# Patient Record
Sex: FEMALE | Race: WHITE | Employment: OTHER | URBAN - METROPOLITAN AREA
[De-identification: names, ages, dates, MRNs, and addresses within clinical notes are randomized per-mention and may not be internally consistent; named-entity substitution may affect disease eponyms.]

---

## 2022-01-01 ENCOUNTER — ANESTHESIA (OUTPATIENT)
Dept: OPERATING ROOM | Age: 87
DRG: 492 | End: 2022-01-01
Payer: COMMERCIAL

## 2022-01-01 ENCOUNTER — APPOINTMENT (OUTPATIENT)
Dept: GENERAL RADIOLOGY | Age: 87
DRG: 492 | End: 2022-01-01
Payer: COMMERCIAL

## 2022-01-01 ENCOUNTER — APPOINTMENT (OUTPATIENT)
Dept: CT IMAGING | Age: 87
DRG: 492 | End: 2022-01-01
Payer: COMMERCIAL

## 2022-01-01 ENCOUNTER — ANESTHESIA EVENT (OUTPATIENT)
Dept: OPERATING ROOM | Age: 87
DRG: 492 | End: 2022-01-01
Payer: COMMERCIAL

## 2022-01-01 ENCOUNTER — HOSPITAL ENCOUNTER (INPATIENT)
Age: 87
LOS: 7 days | DRG: 492 | End: 2022-02-06
Attending: EMERGENCY MEDICINE | Admitting: SURGERY
Payer: COMMERCIAL

## 2022-01-01 ENCOUNTER — APPOINTMENT (OUTPATIENT)
Dept: INTERVENTIONAL RADIOLOGY/VASCULAR | Age: 87
DRG: 492 | End: 2022-01-01
Payer: COMMERCIAL

## 2022-01-01 VITALS
HEIGHT: 69 IN | DIASTOLIC BLOOD PRESSURE: 73 MMHG | WEIGHT: 264 LBS | OXYGEN SATURATION: 85 % | BODY MASS INDEX: 39.1 KG/M2 | TEMPERATURE: 100.7 F | RESPIRATION RATE: 14 BRPM | SYSTOLIC BLOOD PRESSURE: 131 MMHG

## 2022-01-01 VITALS — DIASTOLIC BLOOD PRESSURE: 64 MMHG | TEMPERATURE: 99.4 F | SYSTOLIC BLOOD PRESSURE: 88 MMHG | OXYGEN SATURATION: 88 %

## 2022-01-01 DIAGNOSIS — S82.201B: Primary | ICD-10-CM

## 2022-01-01 DIAGNOSIS — S82.202B: Primary | ICD-10-CM

## 2022-01-01 LAB
ABO/RH: NORMAL
ABSOLUTE EOS #: 0.11 K/UL (ref 0–0.44)
ABSOLUTE EOS #: 0.15 K/UL (ref 0–0.44)
ABSOLUTE EOS #: 0.16 K/UL (ref 0–0.44)
ABSOLUTE EOS #: 0.29 K/UL (ref 0–0.44)
ABSOLUTE EOS #: 0.35 K/UL (ref 0–0.44)
ABSOLUTE EOS #: 0.41 K/UL (ref 0–0.44)
ABSOLUTE EOS #: 0.55 K/UL (ref 0–0.4)
ABSOLUTE IMMATURE GRANULOCYTE: 0.03 K/UL (ref 0–0.3)
ABSOLUTE IMMATURE GRANULOCYTE: 0.07 K/UL (ref 0–0.3)
ABSOLUTE IMMATURE GRANULOCYTE: 0.07 K/UL (ref 0–0.3)
ABSOLUTE IMMATURE GRANULOCYTE: 0.09 K/UL (ref 0–0.3)
ABSOLUTE IMMATURE GRANULOCYTE: 0.11 K/UL (ref 0–0.3)
ABSOLUTE IMMATURE GRANULOCYTE: 0.14 K/UL (ref 0–0.3)
ABSOLUTE IMMATURE GRANULOCYTE: 0.15 K/UL (ref 0–0.3)
ABSOLUTE LYMPH #: 1.1 K/UL (ref 1–4.8)
ABSOLUTE LYMPH #: 1.19 K/UL (ref 1.1–3.7)
ABSOLUTE LYMPH #: 1.52 K/UL (ref 1.1–3.7)
ABSOLUTE LYMPH #: 1.54 K/UL (ref 1.1–3.7)
ABSOLUTE LYMPH #: 1.65 K/UL (ref 1.1–3.7)
ABSOLUTE LYMPH #: 1.96 K/UL (ref 1.1–3.7)
ABSOLUTE LYMPH #: 2.02 K/UL (ref 1.1–3.7)
ABSOLUTE MONO #: 1.1 K/UL (ref 0.1–0.8)
ABSOLUTE MONO #: 1.13 K/UL (ref 0.1–1.2)
ABSOLUTE MONO #: 1.16 K/UL (ref 0.1–1.2)
ABSOLUTE MONO #: 1.21 K/UL (ref 0.1–1.2)
ABSOLUTE MONO #: 1.42 K/UL (ref 0.1–1.2)
ABSOLUTE MONO #: 1.81 K/UL (ref 0.1–1.2)
ABSOLUTE MONO #: 2.3 K/UL (ref 0.1–1.2)
ALLEN TEST: ABNORMAL
ALLEN TEST: NORMAL
ALLEN TEST: NORMAL
ANION GAP SERPL CALCULATED.3IONS-SCNC: 10 MMOL/L (ref 9–17)
ANION GAP SERPL CALCULATED.3IONS-SCNC: 12 MMOL/L (ref 9–17)
ANION GAP SERPL CALCULATED.3IONS-SCNC: 12 MMOL/L (ref 9–17)
ANION GAP SERPL CALCULATED.3IONS-SCNC: 14 MMOL/L (ref 9–17)
ANION GAP SERPL CALCULATED.3IONS-SCNC: 5 MMOL/L (ref 9–17)
ANION GAP SERPL CALCULATED.3IONS-SCNC: 6 MMOL/L (ref 9–17)
ANION GAP SERPL CALCULATED.3IONS-SCNC: 6 MMOL/L (ref 9–17)
ANION GAP SERPL CALCULATED.3IONS-SCNC: 9 MMOL/L (ref 9–17)
ANION GAP: 10 MMOL/L (ref 7–16)
ANION GAP: 11 MMOL/L (ref 7–16)
ANTIBODY SCREEN: NEGATIVE
ARM BAND NUMBER: NORMAL
BASOPHILS # BLD: 0 % (ref 0–2)
BASOPHILS ABSOLUTE: 0 K/UL (ref 0–0.2)
BASOPHILS ABSOLUTE: <0.03 K/UL (ref 0–0.2)
BLD PROD TYP BPU: NORMAL
BLD PROD TYP BPU: NORMAL
BLOOD BANK SPECIMEN: ABNORMAL
BNP INTERPRETATION: ABNORMAL
BUN BLDV-MCNC: 30 MG/DL (ref 8–23)
BUN BLDV-MCNC: 33 MG/DL (ref 8–23)
BUN BLDV-MCNC: 35 MG/DL (ref 8–23)
BUN BLDV-MCNC: 35 MG/DL (ref 8–23)
BUN BLDV-MCNC: 36 MG/DL (ref 8–23)
BUN/CREAT BLD: ABNORMAL (ref 9–20)
CALCIUM IONIZED: 1.25 MMOL/L (ref 1.13–1.33)
CALCIUM IONIZED: 1.26 MMOL/L (ref 1.13–1.33)
CALCIUM SERPL-MCNC: 8.6 MG/DL (ref 8.6–10.4)
CALCIUM SERPL-MCNC: 9 MG/DL (ref 8.6–10.4)
CALCIUM SERPL-MCNC: 9.1 MG/DL (ref 8.6–10.4)
CALCIUM SERPL-MCNC: 9.2 MG/DL (ref 8.6–10.4)
CALCIUM SERPL-MCNC: 9.3 MG/DL (ref 8.6–10.4)
CARBOXYHEMOGLOBIN: 1.7 % (ref 0–5)
CARBOXYHEMOGLOBIN: 2.7 % (ref 0–5)
CHLORIDE BLD-SCNC: 100 MMOL/L (ref 98–107)
CHLORIDE BLD-SCNC: 100 MMOL/L (ref 98–107)
CHLORIDE BLD-SCNC: 102 MMOL/L (ref 98–107)
CHLORIDE BLD-SCNC: 107 MMOL/L (ref 98–107)
CHLORIDE BLD-SCNC: 108 MMOL/L (ref 98–107)
CHLORIDE BLD-SCNC: 96 MMOL/L (ref 98–107)
CHLORIDE BLD-SCNC: 98 MMOL/L (ref 98–107)
CHLORIDE BLD-SCNC: 99 MMOL/L (ref 98–107)
CHLORIDE, WHOLE BLOOD: 101 MMOL/L (ref 98–110)
CO2: 23 MMOL/L (ref 20–31)
CO2: 23 MMOL/L (ref 20–31)
CO2: 24 MMOL/L (ref 20–31)
CO2: 27 MMOL/L (ref 20–31)
CO2: 27 MMOL/L (ref 20–31)
CO2: 28 MMOL/L (ref 20–31)
CO2: 30 MMOL/L (ref 20–31)
CO2: 30 MMOL/L (ref 20–31)
CREAT SERPL-MCNC: 1.01 MG/DL (ref 0.5–0.9)
CREAT SERPL-MCNC: 1.12 MG/DL (ref 0.5–0.9)
CREAT SERPL-MCNC: 1.16 MG/DL (ref 0.5–0.9)
CREAT SERPL-MCNC: 1.45 MG/DL (ref 0.5–0.9)
CREAT SERPL-MCNC: 1.53 MG/DL (ref 0.5–0.9)
CREAT SERPL-MCNC: 1.7 MG/DL (ref 0.5–0.9)
CREAT SERPL-MCNC: 1.75 MG/DL (ref 0.5–0.9)
CREAT SERPL-MCNC: 1.79 MG/DL (ref 0.5–0.9)
CROSSMATCH RESULT: NORMAL
CROSSMATCH RESULT: NORMAL
CULTURE: NO GROWTH
DIFFERENTIAL TYPE: ABNORMAL
DISPENSE STATUS BLOOD BANK: NORMAL
DISPENSE STATUS BLOOD BANK: NORMAL
EKG ATRIAL RATE: 127 BPM
EKG ATRIAL RATE: 136 BPM
EKG ATRIAL RATE: 46 BPM
EKG ATRIAL RATE: 78 BPM
EKG ATRIAL RATE: 91 BPM
EKG Q-T INTERVAL: 288 MS
EKG Q-T INTERVAL: 324 MS
EKG Q-T INTERVAL: 352 MS
EKG Q-T INTERVAL: 358 MS
EKG Q-T INTERVAL: 374 MS
EKG QRS DURATION: 80 MS
EKG QRS DURATION: 84 MS
EKG QRS DURATION: 86 MS
EKG QRS DURATION: 88 MS
EKG QRS DURATION: 88 MS
EKG QTC CALCULATION (BAZETT): 387 MS
EKG QTC CALCULATION (BAZETT): 438 MS
EKG QTC CALCULATION (BAZETT): 442 MS
EKG QTC CALCULATION (BAZETT): 479 MS
EKG QTC CALCULATION (BAZETT): 495 MS
EKG R AXIS: -20 DEGREES
EKG R AXIS: -20 DEGREES
EKG R AXIS: -23 DEGREES
EKG R AXIS: -6 DEGREES
EKG R AXIS: -9 DEGREES
EKG T AXIS: -125 DEGREES
EKG T AXIS: -177 DEGREES
EKG T AXIS: 120 DEGREES
EKG T AXIS: 165 DEGREES
EKG T AXIS: 75 DEGREES
EKG VENTRICULAR RATE: 109 BPM
EKG VENTRICULAR RATE: 110 BPM
EKG VENTRICULAR RATE: 119 BPM
EKG VENTRICULAR RATE: 92 BPM
EKG VENTRICULAR RATE: 99 BPM
EOSINOPHILS RELATIVE PERCENT: 1 % (ref 1–4)
EOSINOPHILS RELATIVE PERCENT: 1 % (ref 1–4)
EOSINOPHILS RELATIVE PERCENT: 2 % (ref 1–4)
EOSINOPHILS RELATIVE PERCENT: 2 % (ref 1–4)
EOSINOPHILS RELATIVE PERCENT: 5 % (ref 1–4)
ETHANOL PERCENT: <0.01 %
ETHANOL: <10 MG/DL
EXPIRATION DATE: NORMAL
FIO2: 100
FIO2: 30
FIO2: 60
FIO2: ABNORMAL
GFR AFRICAN AMERICAN: 32 ML/MIN
GFR AFRICAN AMERICAN: 33 ML/MIN
GFR AFRICAN AMERICAN: 34 ML/MIN
GFR AFRICAN AMERICAN: 39 ML/MIN
GFR AFRICAN AMERICAN: 53 ML/MIN
GFR AFRICAN AMERICAN: 56 ML/MIN
GFR AFRICAN AMERICAN: >60 ML/MIN
GFR AFRICAN AMERICAN: ABNORMAL ML/MIN
GFR NON-AFRICAN AMERICAN: 26 ML/MIN
GFR NON-AFRICAN AMERICAN: 27 ML/MIN
GFR NON-AFRICAN AMERICAN: 27 ML/MIN
GFR NON-AFRICAN AMERICAN: 28 ML/MIN
GFR NON-AFRICAN AMERICAN: 30 ML/MIN
GFR NON-AFRICAN AMERICAN: 32 ML/MIN
GFR NON-AFRICAN AMERICAN: 44 ML/MIN
GFR NON-AFRICAN AMERICAN: 46 ML/MIN
GFR NON-AFRICAN AMERICAN: 52 ML/MIN
GFR NON-AFRICAN AMERICAN: ABNORMAL ML/MIN
GFR SERPL CREATININE-BSD FRML MDRD: 32 ML/MIN
GFR SERPL CREATININE-BSD FRML MDRD: 36 ML/MIN
GFR SERPL CREATININE-BSD FRML MDRD: ABNORMAL ML/MIN/{1.73_M2}
GLUCOSE BLD-MCNC: 114 MG/DL (ref 70–99)
GLUCOSE BLD-MCNC: 118 MG/DL (ref 70–99)
GLUCOSE BLD-MCNC: 123 MG/DL (ref 74–100)
GLUCOSE BLD-MCNC: 135 MG/DL (ref 70–99)
GLUCOSE BLD-MCNC: 148 MG/DL (ref 65–105)
GLUCOSE BLD-MCNC: 149 MG/DL (ref 65–105)
GLUCOSE BLD-MCNC: 149 MG/DL (ref 65–105)
GLUCOSE BLD-MCNC: 150 MG/DL (ref 74–100)
GLUCOSE BLD-MCNC: 151 MG/DL (ref 70–99)
GLUCOSE BLD-MCNC: 155 MG/DL (ref 65–105)
GLUCOSE BLD-MCNC: 157 MG/DL (ref 65–105)
GLUCOSE BLD-MCNC: 161 MG/DL (ref 65–105)
GLUCOSE BLD-MCNC: 162 MG/DL (ref 65–105)
GLUCOSE BLD-MCNC: 166 MG/DL (ref 65–105)
GLUCOSE BLD-MCNC: 170 MG/DL (ref 74–100)
GLUCOSE BLD-MCNC: 172 MG/DL (ref 65–105)
GLUCOSE BLD-MCNC: 176 MG/DL (ref 65–105)
GLUCOSE BLD-MCNC: 177 MG/DL (ref 65–105)
GLUCOSE BLD-MCNC: 179 MG/DL (ref 65–105)
GLUCOSE BLD-MCNC: 180 MG/DL (ref 65–105)
GLUCOSE BLD-MCNC: 180 MG/DL (ref 65–105)
GLUCOSE BLD-MCNC: 181 MG/DL (ref 65–105)
GLUCOSE BLD-MCNC: 181 MG/DL (ref 65–105)
GLUCOSE BLD-MCNC: 181 MG/DL (ref 70–99)
GLUCOSE BLD-MCNC: 182 MG/DL (ref 70–99)
GLUCOSE BLD-MCNC: 183 MG/DL (ref 74–100)
GLUCOSE BLD-MCNC: 184 MG/DL (ref 65–105)
GLUCOSE BLD-MCNC: 185 MG/DL (ref 65–105)
GLUCOSE BLD-MCNC: 194 MG/DL (ref 65–105)
GLUCOSE BLD-MCNC: 194 MG/DL (ref 70–99)
GLUCOSE BLD-MCNC: 197 MG/DL (ref 65–105)
GLUCOSE BLD-MCNC: 199 MG/DL (ref 65–105)
GLUCOSE BLD-MCNC: 206 MG/DL (ref 65–105)
GLUCOSE BLD-MCNC: 211 MG/DL (ref 65–105)
GLUCOSE BLD-MCNC: 213 MG/DL (ref 70–99)
HCG QUALITATIVE: NEGATIVE
HCO3 ARTERIAL: 26.1 MMOL/L (ref 22–27)
HCO3 VENOUS: 30 MMOL/L (ref 24–30)
HCT VFR BLD CALC: 22.2 % (ref 36.3–47.1)
HCT VFR BLD CALC: 23 % (ref 36.3–47.1)
HCT VFR BLD CALC: 23.6 % (ref 36.3–47.1)
HCT VFR BLD CALC: 24.3 % (ref 36.3–47.1)
HCT VFR BLD CALC: 26.2 % (ref 36.3–47.1)
HCT VFR BLD CALC: 28.4 %
HCT VFR BLD CALC: 28.5 % (ref 36.3–47.1)
HCT VFR BLD CALC: 32.4 % (ref 36.3–47.1)
HCT VFR BLD CALC: 36.8 % (ref 36.3–47.1)
HEMOGLOBIN: 10.3 G/DL (ref 11.9–15.1)
HEMOGLOBIN: 12.2 G/DL (ref 11.9–15.1)
HEMOGLOBIN: 7.4 G/DL (ref 11.9–15.1)
HEMOGLOBIN: 7.6 G/DL (ref 11.9–15.1)
HEMOGLOBIN: 7.6 G/DL (ref 11.9–15.1)
HEMOGLOBIN: 8 G/DL (ref 11.9–15.1)
HEMOGLOBIN: 8.4 G/DL (ref 11.9–15.1)
HEMOGLOBIN: 8.9 G/DL (ref 11.9–15.1)
HEMOGLOBIN: 9.2 GM/DL
IMMATURE GRANULOCYTES: 0 %
IMMATURE GRANULOCYTES: 1 %
INR BLD: 1
LV EF: 65 %
LVEF MODALITY: NORMAL
LYMPHOCYTES # BLD: 10 % (ref 24–44)
LYMPHOCYTES # BLD: 13 % (ref 24–43)
LYMPHOCYTES # BLD: 14 % (ref 24–43)
LYMPHOCYTES # BLD: 17 % (ref 24–43)
LYMPHOCYTES # BLD: 22 % (ref 24–43)
Lab: NORMAL
MAGNESIUM: 1.8 MG/DL (ref 1.6–2.6)
MAGNESIUM: 2.2 MG/DL (ref 1.6–2.6)
MAGNESIUM: 2.3 MG/DL (ref 1.6–2.6)
MAGNESIUM: 2.3 MG/DL (ref 1.6–2.6)
MAGNESIUM: 2.4 MG/DL (ref 1.6–2.6)
MCH RBC QN AUTO: 30.6 PG (ref 25.2–33.5)
MCH RBC QN AUTO: 30.9 PG (ref 25.2–33.5)
MCH RBC QN AUTO: 31 PG (ref 25.2–33.5)
MCH RBC QN AUTO: 31.1 PG (ref 25.2–33.5)
MCH RBC QN AUTO: 31.1 PG (ref 25.2–33.5)
MCH RBC QN AUTO: 31.2 PG (ref 25.2–33.5)
MCH RBC QN AUTO: 31.6 PG (ref 25.2–33.5)
MCH RBC QN AUTO: 31.8 PG (ref 25.2–33.5)
MCHC RBC AUTO-ENTMCNC: 31.2 G/DL (ref 28.4–34.8)
MCHC RBC AUTO-ENTMCNC: 31.8 G/DL (ref 28.4–34.8)
MCHC RBC AUTO-ENTMCNC: 32.1 G/DL (ref 28.4–34.8)
MCHC RBC AUTO-ENTMCNC: 32.2 G/DL (ref 28.4–34.8)
MCHC RBC AUTO-ENTMCNC: 32.9 G/DL (ref 28.4–34.8)
MCHC RBC AUTO-ENTMCNC: 33 G/DL (ref 28.4–34.8)
MCHC RBC AUTO-ENTMCNC: 33.2 G/DL (ref 28.4–34.8)
MCHC RBC AUTO-ENTMCNC: 33.3 G/DL (ref 28.4–34.8)
MCV RBC AUTO: 94.1 FL (ref 82.6–102.9)
MCV RBC AUTO: 94.2 FL (ref 82.6–102.9)
MCV RBC AUTO: 94.3 FL (ref 82.6–102.9)
MCV RBC AUTO: 94.9 FL (ref 82.6–102.9)
MCV RBC AUTO: 95.9 FL (ref 82.6–102.9)
MCV RBC AUTO: 96.1 FL (ref 82.6–102.9)
MCV RBC AUTO: 99.2 FL (ref 82.6–102.9)
MCV RBC AUTO: 99.7 FL (ref 82.6–102.9)
METHEMOGLOBIN: ABNORMAL % (ref 0–1.5)
METHEMOGLOBIN: ABNORMAL % (ref 0–1.5)
MODE: ABNORMAL
MODE: NORMAL
MODE: NORMAL
MONOCYTES # BLD: 10 % (ref 1–7)
MONOCYTES # BLD: 10 % (ref 3–12)
MONOCYTES # BLD: 12 % (ref 3–12)
MONOCYTES # BLD: 13 % (ref 3–12)
MONOCYTES # BLD: 16 % (ref 3–12)
MORPHOLOGY: ABNORMAL
MRSA, DNA, NASAL: NEGATIVE
NEGATIVE BASE EXCESS, ART: ABNORMAL (ref 0–2)
NEGATIVE BASE EXCESS, ART: ABNORMAL (ref 0–2)
NEGATIVE BASE EXCESS, ART: ABNORMAL MMOL/L (ref 0–2)
NEGATIVE BASE EXCESS, ART: NORMAL (ref 0–2)
NEGATIVE BASE EXCESS, ART: NORMAL (ref 0–2)
NEGATIVE BASE EXCESS, VEN: ABNORMAL MMOL/L (ref 0–2)
NOTIFICATION TIME: ABNORMAL
NOTIFICATION TIME: ABNORMAL
NOTIFICATION: ABNORMAL
NOTIFICATION: ABNORMAL
NRBC AUTOMATED: 0 PER 100 WBC
NRBC AUTOMATED: 0.2 PER 100 WBC
NRBC AUTOMATED: 0.6 PER 100 WBC
NRBC AUTOMATED: 1.2 PER 100 WBC
NRBC AUTOMATED: 1.4 PER 100 WBC
NUCLEATED RED BLOOD CELLS: 1 PER 100 WBC
O2 DEVICE/FLOW/%: ABNORMAL
O2 DEVICE/FLOW/%: NORMAL
O2 DEVICE/FLOW/%: NORMAL
O2 SAT, ARTERIAL: 97.8 % (ref 94–100)
O2 SAT, VEN: 93.4 % (ref 60–85)
OXYHEMOGLOBIN: ABNORMAL % (ref 95–98)
OXYHEMOGLOBIN: ABNORMAL % (ref 95–98)
PARTIAL THROMBOPLASTIN TIME: 16.9 SEC (ref 20.5–30.5)
PATIENT TEMP: 37
PATIENT TEMP: 37.5
PATIENT TEMP: ABNORMAL
PATIENT TEMP: ABNORMAL
PATIENT TEMP: NORMAL
PATIENT TEMP: NORMAL
PCO2 ARTERIAL: 41.9 MMHG (ref 32–45)
PCO2, ART, TEMP ADJ: 43 (ref 32–45)
PCO2, VEN, TEMP ADJ: ABNORMAL MMHG (ref 39–55)
PCO2, VEN: 46.2 (ref 39–55)
PDW BLD-RTO: 15.5 % (ref 11.8–14.4)
PDW BLD-RTO: 15.6 % (ref 11.8–14.4)
PDW BLD-RTO: 15.7 % (ref 11.8–14.4)
PDW BLD-RTO: 15.7 % (ref 11.8–14.4)
PDW BLD-RTO: 15.8 % (ref 11.8–14.4)
PDW BLD-RTO: 16 % (ref 11.8–14.4)
PDW BLD-RTO: 16.5 % (ref 11.8–14.4)
PDW BLD-RTO: 16.5 % (ref 11.8–14.4)
PEEP/CPAP: ABNORMAL
PEEP/CPAP: ABNORMAL
PH ARTERIAL: 7.41 (ref 7.35–7.45)
PH VENOUS: 7.43 (ref 7.32–7.42)
PH, ART, TEMP ADJ: 7.4 (ref 7.35–7.45)
PH, VEN, TEMP ADJ: ABNORMAL (ref 7.32–7.42)
PHOSPHORUS: 1.2 MG/DL (ref 2.6–4.5)
PHOSPHORUS: 2.1 MG/DL (ref 2.6–4.5)
PHOSPHORUS: 2.4 MG/DL (ref 2.6–4.5)
PHOSPHORUS: 3.8 MG/DL (ref 2.6–4.5)
PHOSPHORUS: 4.1 MG/DL (ref 2.6–4.5)
PLATELET # BLD: 132 K/UL (ref 138–453)
PLATELET # BLD: 135 K/UL (ref 138–453)
PLATELET # BLD: 146 K/UL (ref 138–453)
PLATELET # BLD: 164 K/UL (ref 138–453)
PLATELET # BLD: 172 K/UL (ref 138–453)
PLATELET # BLD: 180 K/UL (ref 138–453)
PLATELET # BLD: 190 K/UL (ref 138–453)
PLATELET # BLD: ABNORMAL K/UL (ref 138–453)
PLATELET ESTIMATE: ABNORMAL
PLATELET, FLUORESCENCE: 137 K/UL (ref 138–453)
PLATELET, IMMATURE FRACTION: 4.1 % (ref 1.1–10.3)
PMV BLD AUTO: 10.2 FL (ref 8.1–13.5)
PMV BLD AUTO: 10.3 FL (ref 8.1–13.5)
PMV BLD AUTO: 10.6 FL (ref 8.1–13.5)
PMV BLD AUTO: 10.8 FL (ref 8.1–13.5)
PMV BLD AUTO: 11.6 FL (ref 8.1–13.5)
PMV BLD AUTO: 9.8 FL (ref 8.1–13.5)
PMV BLD AUTO: 9.8 FL (ref 8.1–13.5)
PMV BLD AUTO: ABNORMAL FL (ref 8.1–13.5)
PO2 ARTERIAL: 136 MMHG (ref 75–95)
PO2, ART, TEMP ADJ: 139 MMHG (ref 75–95)
PO2, VEN, TEMP ADJ: ABNORMAL MMHG (ref 30–50)
PO2, VEN: 72.7 (ref 30–50)
POC BUN: 32 MG/DL (ref 8–26)
POC BUN: 33 MG/DL (ref 8–26)
POC CHLORIDE: 101 MMOL/L (ref 98–107)
POC CHLORIDE: 102 MMOL/L (ref 98–107)
POC CREATININE: 1.62 MG/DL (ref 0.51–1.19)
POC CREATININE: 1.81 MG/DL (ref 0.51–1.19)
POC HCO3: 24.8 MMOL/L (ref 21–28)
POC HCO3: 26.1 MMOL/L (ref 21–28)
POC HCO3: 27 MMOL/L (ref 21–28)
POC HCO3: 29.6 MMOL/L (ref 21–28)
POC HEMATOCRIT: 21 % (ref 36–46)
POC HEMATOCRIT: 22 % (ref 36–46)
POC HEMOGLOBIN: 7 G/DL (ref 12–16)
POC HEMOGLOBIN: 7.5 G/DL (ref 12–16)
POC IONIZED CALCIUM: 1.07 MMOL/L (ref 1.15–1.33)
POC IONIZED CALCIUM: 1.22 MMOL/L (ref 1.15–1.33)
POC IONIZED CALCIUM: 1.26 MMOL/L (ref 1.15–1.33)
POC IONIZED CALCIUM: 1.28 MMOL/L (ref 1.15–1.33)
POC LACTIC ACID: 0.78 MMOL/L (ref 0.56–1.39)
POC LACTIC ACID: 0.94 MMOL/L (ref 0.56–1.39)
POC LACTIC ACID: 1.07 MMOL/L (ref 0.56–1.39)
POC LACTIC ACID: 1.41 MMOL/L (ref 0.56–1.39)
POC O2 SATURATION: 100 % (ref 94–98)
POC O2 SATURATION: 97 % (ref 94–98)
POC O2 SATURATION: 97 % (ref 94–98)
POC O2 SATURATION: 98 % (ref 94–98)
POC PCO2 TEMP: ABNORMAL MM HG
POC PCO2 TEMP: ABNORMAL MM HG
POC PCO2 TEMP: NORMAL MM HG
POC PCO2 TEMP: NORMAL MM HG
POC PCO2: 38.1 MM HG (ref 35–48)
POC PCO2: 39 MM HG (ref 35–48)
POC PCO2: 40.8 MM HG (ref 35–48)
POC PCO2: 41.9 MM HG (ref 35–48)
POC PH TEMP: ABNORMAL
POC PH TEMP: ABNORMAL
POC PH TEMP: NORMAL
POC PH TEMP: NORMAL
POC PH: 7.41 (ref 7.35–7.45)
POC PH: 7.42 (ref 7.35–7.45)
POC PH: 7.46 (ref 7.35–7.45)
POC PH: 7.46 (ref 7.35–7.45)
POC PO2 TEMP: ABNORMAL MM HG
POC PO2 TEMP: ABNORMAL MM HG
POC PO2 TEMP: NORMAL MM HG
POC PO2 TEMP: NORMAL MM HG
POC PO2: 384.3 MM HG (ref 83–108)
POC PO2: 88.3 MM HG (ref 83–108)
POC PO2: 93.4 MM HG (ref 83–108)
POC PO2: 97.5 MM HG (ref 83–108)
POC POTASSIUM: 3.6 MMOL/L (ref 3.5–4.5)
POC POTASSIUM: 3.8 MMOL/L (ref 3.5–4.5)
POC SODIUM: 137 MMOL/L (ref 138–146)
POC SODIUM: 140 MMOL/L (ref 138–146)
POC TCO2: 25 MMOL/L (ref 22–30)
POC TCO2: 30 MMOL/L (ref 22–30)
POSITIVE BASE EXCESS, ART: 0 (ref 0–3)
POSITIVE BASE EXCESS, ART: 1 (ref 0–3)
POSITIVE BASE EXCESS, ART: 1.8 MMOL/L (ref 0–2)
POSITIVE BASE EXCESS, ART: 3 (ref 0–3)
POSITIVE BASE EXCESS, ART: 5 (ref 0–3)
POSITIVE BASE EXCESS, VEN: 5.3 MMOL/L (ref 0–2)
POTASSIUM SERPL-SCNC: 3.8 MMOL/L (ref 3.7–5.3)
POTASSIUM SERPL-SCNC: 3.8 MMOL/L (ref 3.7–5.3)
POTASSIUM SERPL-SCNC: 4.1 MMOL/L (ref 3.7–5.3)
POTASSIUM SERPL-SCNC: 4.1 MMOL/L (ref 3.7–5.3)
POTASSIUM SERPL-SCNC: 4.2 MMOL/L (ref 3.7–5.3)
POTASSIUM SERPL-SCNC: 4.3 MMOL/L (ref 3.7–5.3)
POTASSIUM SERPL-SCNC: 4.5 MMOL/L (ref 3.7–5.3)
POTASSIUM SERPL-SCNC: 4.8 MMOL/L (ref 3.7–5.3)
POTASSIUM, WHOLE BLOOD: 4 MMOL/L (ref 3.6–5)
PRO-BNP: 1768 PG/ML
PROTHROMBIN TIME: 10.6 SEC (ref 9.1–12.3)
PSV: ABNORMAL
PSV: ABNORMAL
PT. POSITION: ABNORMAL
PT. POSITION: ABNORMAL
RBC # BLD: 2.34 M/UL (ref 3.95–5.11)
RBC # BLD: 2.44 M/UL (ref 3.95–5.11)
RBC # BLD: 2.46 M/UL (ref 3.95–5.11)
RBC # BLD: 2.58 M/UL (ref 3.95–5.11)
RBC # BLD: 2.64 M/UL (ref 3.95–5.11)
RBC # BLD: 2.86 M/UL (ref 3.95–5.11)
RBC # BLD: 3.37 M/UL (ref 3.95–5.11)
RBC # BLD: 3.91 M/UL (ref 3.95–5.11)
RBC # BLD: ABNORMAL 10*6/UL
RESPIRATORY RATE: ABNORMAL
RESPIRATORY RATE: ABNORMAL
SAMPLE SITE: ABNORMAL
SAMPLE SITE: NORMAL
SAMPLE SITE: NORMAL
SARS-COV-2, RAPID: NOT DETECTED
SEG NEUTROPHILS: 57 % (ref 36–65)
SEG NEUTROPHILS: 61 % (ref 36–65)
SEG NEUTROPHILS: 67 % (ref 36–65)
SEG NEUTROPHILS: 71 % (ref 36–65)
SEG NEUTROPHILS: 73 % (ref 36–65)
SEG NEUTROPHILS: 74 % (ref 36–65)
SEG NEUTROPHILS: 74 % (ref 36–66)
SEGMENTED NEUTROPHILS ABSOLUTE COUNT: 11.03 K/UL (ref 1.5–8.1)
SEGMENTED NEUTROPHILS ABSOLUTE COUNT: 4.3 K/UL (ref 1.5–8.1)
SEGMENTED NEUTROPHILS ABSOLUTE COUNT: 5.62 K/UL (ref 1.5–8.1)
SEGMENTED NEUTROPHILS ABSOLUTE COUNT: 6.13 K/UL (ref 1.5–8.1)
SEGMENTED NEUTROPHILS ABSOLUTE COUNT: 8.14 K/UL (ref 1.8–7.7)
SEGMENTED NEUTROPHILS ABSOLUTE COUNT: 8.29 K/UL (ref 1.5–8.1)
SEGMENTED NEUTROPHILS ABSOLUTE COUNT: 9.65 K/UL (ref 1.5–8.1)
SET RATE: ABNORMAL
SET RATE: ABNORMAL
SODIUM BLD-SCNC: 133 MMOL/L (ref 135–144)
SODIUM BLD-SCNC: 135 MMOL/L (ref 135–144)
SODIUM BLD-SCNC: 135 MMOL/L (ref 135–144)
SODIUM BLD-SCNC: 136 MMOL/L (ref 135–144)
SODIUM BLD-SCNC: 136 MMOL/L (ref 135–144)
SODIUM BLD-SCNC: 137 MMOL/L (ref 135–144)
SODIUM BLD-SCNC: 140 MMOL/L (ref 135–144)
SODIUM BLD-SCNC: 144 MMOL/L (ref 135–144)
SODIUM, WHOLE BLOOD: 137 MMOL/L (ref 136–145)
SPECIMEN DESCRIPTION: NORMAL
TCO2 (CALC), ART: ABNORMAL MMOL/L (ref 22–29)
TCO2 (CALC), ART: ABNORMAL MMOL/L (ref 22–29)
TCO2 (CALC), ART: NORMAL MMOL/L (ref 22–29)
TCO2 (CALC), ART: NORMAL MMOL/L (ref 22–29)
TEXT FOR RESPIRATORY: ABNORMAL
TEXT FOR RESPIRATORY: ABNORMAL
TOTAL HB: ABNORMAL G/DL (ref 12–16)
TOTAL HB: ABNORMAL G/DL (ref 12–16)
TOTAL RATE: ABNORMAL
TOTAL RATE: ABNORMAL
TRANSFUSION STATUS: NORMAL
TRANSFUSION STATUS: NORMAL
TROPONIN INTERP: ABNORMAL
TROPONIN T: ABNORMAL NG/ML
TROPONIN, HIGH SENSITIVITY: 43 NG/L (ref 0–14)
TROPONIN, HIGH SENSITIVITY: 53 NG/L (ref 0–14)
TROPONIN, HIGH SENSITIVITY: 71 NG/L (ref 0–14)
UNIT DIVISION: 0
UNIT DIVISION: 0
UNIT NUMBER: NORMAL
UNIT NUMBER: NORMAL
VITAMIN D 25-HYDROXY: 49.8 NG/ML (ref 30–100)
VT: ABNORMAL
VT: ABNORMAL
WBC # BLD: 11 K/UL (ref 3.5–11.3)
WBC # BLD: 11.2 K/UL (ref 3.5–11.3)
WBC # BLD: 12.6 K/UL (ref 3.5–11.3)
WBC # BLD: 14.4 K/UL (ref 3.5–11.3)
WBC # BLD: 15.1 K/UL (ref 3.5–11.3)
WBC # BLD: 7.6 K/UL (ref 3.5–11.3)
WBC # BLD: 8.7 K/UL (ref 3.5–11.3)
WBC # BLD: 9.1 K/UL (ref 3.5–11.3)
WBC # BLD: ABNORMAL 10*3/UL

## 2022-01-01 PROCEDURE — 93010 ELECTROCARDIOGRAM REPORT: CPT | Performed by: INTERNAL MEDICINE

## 2022-01-01 PROCEDURE — 6370000000 HC RX 637 (ALT 250 FOR IP): Performed by: STUDENT IN AN ORGANIZED HEALTH CARE EDUCATION/TRAINING PROGRAM

## 2022-01-01 PROCEDURE — 6370000000 HC RX 637 (ALT 250 FOR IP): Performed by: NURSE PRACTITIONER

## 2022-01-01 PROCEDURE — 2500000003 HC RX 250 WO HCPCS: Performed by: STUDENT IN AN ORGANIZED HEALTH CARE EDUCATION/TRAINING PROGRAM

## 2022-01-01 PROCEDURE — 2580000003 HC RX 258: Performed by: STUDENT IN AN ORGANIZED HEALTH CARE EDUCATION/TRAINING PROGRAM

## 2022-01-01 PROCEDURE — 94003 VENT MGMT INPAT SUBQ DAY: CPT

## 2022-01-01 PROCEDURE — 73590 X-RAY EXAM OF LOWER LEG: CPT

## 2022-01-01 PROCEDURE — 3209999900 FLUORO FOR SURGICAL PROCEDURES

## 2022-01-01 PROCEDURE — 2500000003 HC RX 250 WO HCPCS: Performed by: NURSE PRACTITIONER

## 2022-01-01 PROCEDURE — 83735 ASSAY OF MAGNESIUM: CPT

## 2022-01-01 PROCEDURE — 6360000002 HC RX W HCPCS: Performed by: STUDENT IN AN ORGANIZED HEALTH CARE EDUCATION/TRAINING PROGRAM

## 2022-01-01 PROCEDURE — 2060000000 HC ICU INTERMEDIATE R&B

## 2022-01-01 PROCEDURE — 82947 ASSAY GLUCOSE BLOOD QUANT: CPT

## 2022-01-01 PROCEDURE — 85025 COMPLETE CBC W/AUTO DIFF WBC: CPT

## 2022-01-01 PROCEDURE — 6360000002 HC RX W HCPCS: Performed by: INTERNAL MEDICINE

## 2022-01-01 PROCEDURE — 2709999900 HC NON-CHARGEABLE SUPPLY

## 2022-01-01 PROCEDURE — 27759 TREATMENT OF TIBIA FRACTURE: CPT | Performed by: ORTHOPAEDIC SURGERY

## 2022-01-01 PROCEDURE — 94761 N-INVAS EAR/PLS OXIMETRY MLT: CPT

## 2022-01-01 PROCEDURE — 6360000002 HC RX W HCPCS

## 2022-01-01 PROCEDURE — 86900 BLOOD TYPING SEROLOGIC ABO: CPT

## 2022-01-01 PROCEDURE — 82803 BLOOD GASES ANY COMBINATION: CPT

## 2022-01-01 PROCEDURE — 93005 ELECTROCARDIOGRAM TRACING: CPT | Performed by: STUDENT IN AN ORGANIZED HEALTH CARE EDUCATION/TRAINING PROGRAM

## 2022-01-01 PROCEDURE — 2700000000 HC OXYGEN THERAPY PER DAY

## 2022-01-01 PROCEDURE — 85014 HEMATOCRIT: CPT

## 2022-01-01 PROCEDURE — 73630 X-RAY EXAM OF FOOT: CPT

## 2022-01-01 PROCEDURE — 85730 THROMBOPLASTIN TIME PARTIAL: CPT

## 2022-01-01 PROCEDURE — 37799 UNLISTED PX VASCULAR SURGERY: CPT

## 2022-01-01 PROCEDURE — 99284 EMERGENCY DEPT VISIT MOD MDM: CPT

## 2022-01-01 PROCEDURE — 96375 TX/PRO/DX INJ NEW DRUG ADDON: CPT

## 2022-01-01 PROCEDURE — C1769 GUIDE WIRE: HCPCS | Performed by: ORTHOPAEDIC SURGERY

## 2022-01-01 PROCEDURE — 82330 ASSAY OF CALCIUM: CPT

## 2022-01-01 PROCEDURE — 2580000003 HC RX 258: Performed by: ORTHOPAEDIC SURGERY

## 2022-01-01 PROCEDURE — 2720000010 HC SURG SUPPLY STERILE: Performed by: ORTHOPAEDIC SURGERY

## 2022-01-01 PROCEDURE — 82565 ASSAY OF CREATININE: CPT

## 2022-01-01 PROCEDURE — 0DH67UZ INSERTION OF FEEDING DEVICE INTO STOMACH, VIA NATURAL OR ARTIFICIAL OPENING: ICD-10-PCS | Performed by: RADIOLOGY

## 2022-01-01 PROCEDURE — 85610 PROTHROMBIN TIME: CPT

## 2022-01-01 PROCEDURE — 94002 VENT MGMT INPAT INIT DAY: CPT

## 2022-01-01 PROCEDURE — 73562 X-RAY EXAM OF KNEE 3: CPT

## 2022-01-01 PROCEDURE — 74018 RADEX ABDOMEN 1 VIEW: CPT

## 2022-01-01 PROCEDURE — 73700 CT LOWER EXTREMITY W/O DYE: CPT

## 2022-01-01 PROCEDURE — 3700000000 HC ANESTHESIA ATTENDED CARE: Performed by: ORTHOPAEDIC SURGERY

## 2022-01-01 PROCEDURE — 5A1945Z RESPIRATORY VENTILATION, 24-96 CONSECUTIVE HOURS: ICD-10-PCS | Performed by: SURGERY

## 2022-01-01 PROCEDURE — 86850 RBC ANTIBODY SCREEN: CPT

## 2022-01-01 PROCEDURE — 80048 BASIC METABOLIC PNL TOTAL CA: CPT

## 2022-01-01 PROCEDURE — 73600 X-RAY EXAM OF ANKLE: CPT

## 2022-01-01 PROCEDURE — 6360000002 HC RX W HCPCS: Performed by: NURSE PRACTITIONER

## 2022-01-01 PROCEDURE — G0480 DRUG TEST DEF 1-7 CLASSES: HCPCS

## 2022-01-01 PROCEDURE — 6360000002 HC RX W HCPCS: Performed by: EMERGENCY MEDICINE

## 2022-01-01 PROCEDURE — 85027 COMPLETE CBC AUTOMATED: CPT

## 2022-01-01 PROCEDURE — 36415 COLL VENOUS BLD VENIPUNCTURE: CPT

## 2022-01-01 PROCEDURE — 84100 ASSAY OF PHOSPHORUS: CPT

## 2022-01-01 PROCEDURE — 6360000002 HC RX W HCPCS: Performed by: NURSE ANESTHETIST, CERTIFIED REGISTERED

## 2022-01-01 PROCEDURE — 2000000000 HC ICU R&B

## 2022-01-01 PROCEDURE — 70450 CT HEAD/BRAIN W/O DYE: CPT

## 2022-01-01 PROCEDURE — 3209999900 CT THORACIC SPINE TRAUMA RECONSTRUCTION

## 2022-01-01 PROCEDURE — 87086 URINE CULTURE/COLONY COUNT: CPT

## 2022-01-01 PROCEDURE — 83880 ASSAY OF NATRIURETIC PEPTIDE: CPT

## 2022-01-01 PROCEDURE — 84520 ASSAY OF UREA NITROGEN: CPT

## 2022-01-01 PROCEDURE — 71045 X-RAY EXAM CHEST 1 VIEW: CPT

## 2022-01-01 PROCEDURE — 2580000003 HC RX 258: Performed by: NURSE ANESTHETIST, CERTIFIED REGISTERED

## 2022-01-01 PROCEDURE — 3600000014 HC SURGERY LEVEL 4 ADDTL 15MIN: Performed by: ORTHOPAEDIC SURGERY

## 2022-01-01 PROCEDURE — 0QSG06Z REPOSITION RIGHT TIBIA WITH INTRAMEDULLARY INTERNAL FIXATION DEVICE, OPEN APPROACH: ICD-10-PCS | Performed by: ORTHOPAEDIC SURGERY

## 2022-01-01 PROCEDURE — P9041 ALBUMIN (HUMAN),5%, 50ML: HCPCS

## 2022-01-01 PROCEDURE — 83605 ASSAY OF LACTIC ACID: CPT

## 2022-01-01 PROCEDURE — 82306 VITAMIN D 25 HYDROXY: CPT

## 2022-01-01 PROCEDURE — 85018 HEMOGLOBIN: CPT

## 2022-01-01 PROCEDURE — 3700000001 HC ADD 15 MINUTES (ANESTHESIA): Performed by: ORTHOPAEDIC SURGERY

## 2022-01-01 PROCEDURE — 87641 MR-STAPH DNA AMP PROBE: CPT

## 2022-01-01 PROCEDURE — 6360000002 HC RX W HCPCS: Performed by: FAMILY MEDICINE

## 2022-01-01 PROCEDURE — 72125 CT NECK SPINE W/O DYE: CPT

## 2022-01-01 PROCEDURE — 0QSH06Z REPOSITION LEFT TIBIA WITH INTRAMEDULLARY INTERNAL FIXATION DEVICE, OPEN APPROACH: ICD-10-PCS | Performed by: ORTHOPAEDIC SURGERY

## 2022-01-01 PROCEDURE — 3209999900 CT LUMBAR SPINE TRAUMA RECONSTRUCTION

## 2022-01-01 PROCEDURE — P9016 RBC LEUKOCYTES REDUCED: HCPCS

## 2022-01-01 PROCEDURE — 43752 NASAL/OROGASTRIC W/TUBE PLMT: CPT

## 2022-01-01 PROCEDURE — 74176 CT ABD & PELVIS W/O CONTRAST: CPT

## 2022-01-01 PROCEDURE — A4217 STERILE WATER/SALINE, 500 ML: HCPCS | Performed by: ORTHOPAEDIC SURGERY

## 2022-01-01 PROCEDURE — 99222 1ST HOSP IP/OBS MODERATE 55: CPT | Performed by: FAMILY MEDICINE

## 2022-01-01 PROCEDURE — 84484 ASSAY OF TROPONIN QUANT: CPT

## 2022-01-01 PROCEDURE — C1713 ANCHOR/SCREW BN/BN,TIS/BN: HCPCS | Performed by: ORTHOPAEDIC SURGERY

## 2022-01-01 PROCEDURE — 6370000000 HC RX 637 (ALT 250 FOR IP): Performed by: HEALTH CARE PROVIDER

## 2022-01-01 PROCEDURE — 82805 BLOOD GASES W/O2 SATURATION: CPT

## 2022-01-01 PROCEDURE — 36430 TRANSFUSION BLD/BLD COMPNT: CPT

## 2022-01-01 PROCEDURE — 85055 RETICULATED PLATELET ASSAY: CPT

## 2022-01-01 PROCEDURE — 90715 TDAP VACCINE 7 YRS/> IM: CPT

## 2022-01-01 PROCEDURE — 2709999900 HC NON-CHARGEABLE SUPPLY: Performed by: ORTHOPAEDIC SURGERY

## 2022-01-01 PROCEDURE — 1200000000 HC SEMI PRIVATE

## 2022-01-01 PROCEDURE — 3600000004 HC SURGERY LEVEL 4 BASE: Performed by: ORTHOPAEDIC SURGERY

## 2022-01-01 PROCEDURE — 80051 ELECTROLYTE PANEL: CPT

## 2022-01-01 PROCEDURE — 87635 SARS-COV-2 COVID-19 AMP PRB: CPT

## 2022-01-01 PROCEDURE — 73610 X-RAY EXAM OF ANKLE: CPT

## 2022-01-01 PROCEDURE — 86920 COMPATIBILITY TEST SPIN: CPT

## 2022-01-01 PROCEDURE — 6810039001 HC L1 TRAUMA PRIORITY

## 2022-01-01 PROCEDURE — 0QPG04Z REMOVAL OF INTERNAL FIXATION DEVICE FROM RIGHT TIBIA, OPEN APPROACH: ICD-10-PCS | Performed by: ORTHOPAEDIC SURGERY

## 2022-01-01 PROCEDURE — 73030 X-RAY EXAM OF SHOULDER: CPT

## 2022-01-01 PROCEDURE — 86901 BLOOD TYPING SEROLOGIC RH(D): CPT

## 2022-01-01 PROCEDURE — C1769 GUIDE WIRE: HCPCS

## 2022-01-01 PROCEDURE — 96374 THER/PROPH/DIAG INJ IV PUSH: CPT

## 2022-01-01 PROCEDURE — 84703 CHORIONIC GONADOTROPIN ASSAY: CPT

## 2022-01-01 PROCEDURE — 2500000003 HC RX 250 WO HCPCS

## 2022-01-01 PROCEDURE — 92523 SPEECH SOUND LANG COMPREHEN: CPT

## 2022-01-01 PROCEDURE — 94664 DEMO&/EVAL PT USE INHALER: CPT

## 2022-01-01 PROCEDURE — 36620 INSERTION CATHETER ARTERY: CPT

## 2022-01-01 PROCEDURE — 99221 1ST HOSP IP/OBS SF/LOW 40: CPT | Performed by: ORTHOPAEDIC SURGERY

## 2022-01-01 PROCEDURE — 2500000003 HC RX 250 WO HCPCS: Performed by: NURSE ANESTHETIST, CERTIFIED REGISTERED

## 2022-01-01 PROCEDURE — 93306 TTE W/DOPPLER COMPLETE: CPT

## 2022-01-01 PROCEDURE — 90471 IMMUNIZATION ADMIN: CPT

## 2022-01-01 PROCEDURE — 93005 ELECTROCARDIOGRAM TRACING: CPT | Performed by: NURSE PRACTITIONER

## 2022-01-01 PROCEDURE — 11012 DEB SKIN BONE AT FX SITE: CPT | Performed by: ORTHOPAEDIC SURGERY

## 2022-01-01 PROCEDURE — 99233 SBSQ HOSP IP/OBS HIGH 50: CPT | Performed by: FAMILY MEDICINE

## 2022-01-01 PROCEDURE — 84132 ASSAY OF SERUM POTASSIUM: CPT

## 2022-01-01 DEVICE — LOCKING SCREW, FULLY THREADED: Type: IMPLANTABLE DEVICE | Site: TIBIA | Status: FUNCTIONAL

## 2022-01-01 DEVICE — ADVANCED LOCKING SCREW: Type: IMPLANTABLE DEVICE | Site: TIBIA | Status: FUNCTIONAL

## 2022-01-01 DEVICE — ANKLE ARTHRODESIS NAIL, RIGHT
Type: IMPLANTABLE DEVICE | Site: TIBIA | Status: FUNCTIONAL
Brand: T2

## 2022-01-01 RX ORDER — PRIMIDONE 50 MG/1
50 TABLET ORAL 3 TIMES DAILY
Status: DISCONTINUED | OUTPATIENT
Start: 2022-01-01 | End: 2022-01-01 | Stop reason: HOSPADM

## 2022-01-01 RX ORDER — SODIUM CHLORIDE 9 MG/ML
INJECTION, SOLUTION INTRAVENOUS PRN
Status: DISCONTINUED | OUTPATIENT
Start: 2022-01-01 | End: 2022-01-01

## 2022-01-01 RX ORDER — AMLODIPINE BESYLATE 5 MG/1
5 TABLET ORAL DAILY
COMMUNITY

## 2022-01-01 RX ORDER — OXYCODONE HYDROCHLORIDE 5 MG/1
2.5 TABLET ORAL EVERY 6 HOURS PRN
Status: DISCONTINUED | OUTPATIENT
Start: 2022-01-01 | End: 2022-01-01 | Stop reason: HOSPADM

## 2022-01-01 RX ORDER — ONDANSETRON 2 MG/ML
4 INJECTION INTRAMUSCULAR; INTRAVENOUS EVERY 6 HOURS PRN
Status: DISCONTINUED | OUTPATIENT
Start: 2022-01-01 | End: 2022-01-01

## 2022-01-01 RX ORDER — DIGOXIN 0.25 MG/ML
125 INJECTION INTRAMUSCULAR; INTRAVENOUS ONCE
Status: COMPLETED | OUTPATIENT
Start: 2022-01-01 | End: 2022-01-01

## 2022-01-01 RX ORDER — NICOTINE POLACRILEX 4 MG
15 LOZENGE BUCCAL PRN
Status: DISCONTINUED | OUTPATIENT
Start: 2022-01-01 | End: 2022-01-01

## 2022-01-01 RX ORDER — POTASSIUM CHLORIDE 750 MG/1
20 TABLET, FILM COATED, EXTENDED RELEASE ORAL DAILY
COMMUNITY

## 2022-01-01 RX ORDER — PROPRANOLOL HYDROCHLORIDE 20 MG/1
20 TABLET ORAL EVERY 8 HOURS
COMMUNITY

## 2022-01-01 RX ORDER — SODIUM CHLORIDE 0.9 % (FLUSH) 0.9 %
5-40 SYRINGE (ML) INJECTION EVERY 12 HOURS SCHEDULED
Status: DISCONTINUED | OUTPATIENT
Start: 2022-01-01 | End: 2022-01-01 | Stop reason: HOSPADM

## 2022-01-01 RX ORDER — FENTANYL CITRATE 50 UG/ML
INJECTION, SOLUTION INTRAMUSCULAR; INTRAVENOUS DAILY PRN
Status: COMPLETED | OUTPATIENT
Start: 2022-01-01 | End: 2022-01-01

## 2022-01-01 RX ORDER — OXYCODONE HCL 5 MG/5 ML
2.5 SOLUTION, ORAL ORAL EVERY 8 HOURS PRN
Status: DISCONTINUED | OUTPATIENT
Start: 2022-01-01 | End: 2022-01-01

## 2022-01-01 RX ORDER — LEVOTHYROXINE SODIUM 0.03 MG/1
25 TABLET ORAL DAILY
Status: DISCONTINUED | OUTPATIENT
Start: 2022-01-01 | End: 2022-01-01 | Stop reason: HOSPADM

## 2022-01-01 RX ORDER — ALBUMIN, HUMAN INJ 5% 5 %
SOLUTION INTRAVENOUS PRN
Status: DISCONTINUED | OUTPATIENT
Start: 2022-01-01 | End: 2022-01-01 | Stop reason: SDUPTHER

## 2022-01-01 RX ORDER — ROCURONIUM BROMIDE 10 MG/ML
INJECTION, SOLUTION INTRAVENOUS PRN
Status: DISCONTINUED | OUTPATIENT
Start: 2022-01-01 | End: 2022-01-01 | Stop reason: SDUPTHER

## 2022-01-01 RX ORDER — CEFAZOLIN SODIUM 1 G/3ML
INJECTION, POWDER, FOR SOLUTION INTRAMUSCULAR; INTRAVENOUS PRN
Status: DISCONTINUED | OUTPATIENT
Start: 2022-01-01 | End: 2022-01-01 | Stop reason: SDUPTHER

## 2022-01-01 RX ORDER — NOREPINEPHRINE BIT/0.9 % NACL 16MG/250ML
INFUSION BOTTLE (ML) INTRAVENOUS
Status: COMPLETED
Start: 2022-01-01 | End: 2022-01-01

## 2022-01-01 RX ORDER — FENTANYL CITRATE 50 UG/ML
50 INJECTION, SOLUTION INTRAMUSCULAR; INTRAVENOUS
Status: DISCONTINUED | OUTPATIENT
Start: 2022-01-01 | End: 2022-01-01

## 2022-01-01 RX ORDER — LEVOTHYROXINE SODIUM 0.03 MG/1
25 TABLET ORAL DAILY
Status: DISCONTINUED | OUTPATIENT
Start: 2022-01-01 | End: 2022-01-01

## 2022-01-01 RX ORDER — PRIMIDONE 50 MG/1
50 TABLET ORAL 3 TIMES DAILY
COMMUNITY

## 2022-01-01 RX ORDER — DEXTROSE MONOHYDRATE 25 G/50ML
12.5 INJECTION, SOLUTION INTRAVENOUS PRN
Status: DISCONTINUED | OUTPATIENT
Start: 2022-01-01 | End: 2022-01-01

## 2022-01-01 RX ORDER — SODIUM CHLORIDE 9 MG/ML
25 INJECTION, SOLUTION INTRAVENOUS PRN
Status: DISCONTINUED | OUTPATIENT
Start: 2022-01-01 | End: 2022-01-01

## 2022-01-01 RX ORDER — SODIUM CHLORIDE 9 MG/ML
INJECTION, SOLUTION INTRAVENOUS CONTINUOUS
Status: DISCONTINUED | OUTPATIENT
Start: 2022-01-01 | End: 2022-01-01

## 2022-01-01 RX ORDER — MAGNESIUM SULFATE IN WATER 40 MG/ML
2000 INJECTION, SOLUTION INTRAVENOUS ONCE
Status: COMPLETED | OUTPATIENT
Start: 2022-01-01 | End: 2022-01-01

## 2022-01-01 RX ORDER — PREGABALIN 100 MG/1
100 CAPSULE ORAL EVERY 8 HOURS SCHEDULED
Status: DISCONTINUED | OUTPATIENT
Start: 2022-01-01 | End: 2022-01-01 | Stop reason: HOSPADM

## 2022-01-01 RX ORDER — ACETAMINOPHEN 500 MG
500 TABLET ORAL EVERY 6 HOURS PRN
COMMUNITY

## 2022-01-01 RX ORDER — METHOCARBAMOL 750 MG/1
750 TABLET, FILM COATED ORAL EVERY 6 HOURS
Qty: 40 TABLET | Refills: 0 | OUTPATIENT
Start: 2022-01-01 | End: 2022-02-14

## 2022-01-01 RX ORDER — LIDOCAINE HYDROCHLORIDE 10 MG/ML
INJECTION, SOLUTION EPIDURAL; INFILTRATION; INTRACAUDAL; PERINEURAL PRN
Status: DISCONTINUED | OUTPATIENT
Start: 2022-01-01 | End: 2022-01-01 | Stop reason: SDUPTHER

## 2022-01-01 RX ORDER — SODIUM CHLORIDE 0.9 % (FLUSH) 0.9 %
5-40 SYRINGE (ML) INJECTION PRN
Status: DISCONTINUED | OUTPATIENT
Start: 2022-01-01 | End: 2022-01-01 | Stop reason: HOSPADM

## 2022-01-01 RX ORDER — DIGOXIN 0.25 MG/ML
125 INJECTION INTRAMUSCULAR; INTRAVENOUS
Status: DISCONTINUED | OUTPATIENT
Start: 2022-01-01 | End: 2022-01-01

## 2022-01-01 RX ORDER — PROPOFOL 10 MG/ML
5-50 INJECTION, EMULSION INTRAVENOUS
Status: DISCONTINUED | OUTPATIENT
Start: 2022-01-01 | End: 2022-01-01

## 2022-01-01 RX ORDER — LEVOTHYROXINE SODIUM 0.03 MG/1
25 TABLET ORAL DAILY
COMMUNITY

## 2022-01-01 RX ORDER — SPIRONOLACTONE 25 MG/1
25 TABLET ORAL DAILY
COMMUNITY

## 2022-01-01 RX ORDER — FUROSEMIDE 10 MG/ML
40 INJECTION INTRAMUSCULAR; INTRAVENOUS ONCE
Status: COMPLETED | OUTPATIENT
Start: 2022-01-01 | End: 2022-01-01

## 2022-01-01 RX ORDER — VITAMIN E 268 MG
400 CAPSULE ORAL DAILY
COMMUNITY

## 2022-01-01 RX ORDER — DIGOXIN 0.25 MG/ML
250 INJECTION INTRAMUSCULAR; INTRAVENOUS DAILY
Status: DISCONTINUED | OUTPATIENT
Start: 2022-01-01 | End: 2022-01-01

## 2022-01-01 RX ORDER — MORPHINE SULFATE 4 MG/ML
2 INJECTION, SOLUTION INTRAMUSCULAR; INTRAVENOUS
Status: DISCONTINUED | OUTPATIENT
Start: 2022-01-01 | End: 2022-01-01

## 2022-01-01 RX ORDER — FUROSEMIDE 10 MG/ML
20 INJECTION INTRAMUSCULAR; INTRAVENOUS ONCE
Status: COMPLETED | OUTPATIENT
Start: 2022-01-01 | End: 2022-01-01

## 2022-01-01 RX ORDER — OXYCODONE HYDROCHLORIDE AND ACETAMINOPHEN 5; 325 MG/1; MG/1
2 TABLET ORAL PRN
Status: DISCONTINUED | OUTPATIENT
Start: 2022-01-01 | End: 2022-01-01 | Stop reason: HOSPADM

## 2022-01-01 RX ORDER — FENTANYL CITRATE 50 UG/ML
INJECTION, SOLUTION INTRAMUSCULAR; INTRAVENOUS PRN
Status: DISCONTINUED | OUTPATIENT
Start: 2022-01-01 | End: 2022-01-01 | Stop reason: SDUPTHER

## 2022-01-01 RX ORDER — PREGABALIN 100 MG/1
100 CAPSULE ORAL 3 TIMES DAILY
COMMUNITY

## 2022-01-01 RX ORDER — OXYCODONE HYDROCHLORIDE 5 MG/1
5 TABLET ORAL ONCE
Status: COMPLETED | OUTPATIENT
Start: 2022-01-01 | End: 2022-01-01

## 2022-01-01 RX ORDER — LABETALOL HYDROCHLORIDE 5 MG/ML
5 INJECTION, SOLUTION INTRAVENOUS EVERY 10 MIN PRN
Status: DISCONTINUED | OUTPATIENT
Start: 2022-01-01 | End: 2022-01-01 | Stop reason: HOSPADM

## 2022-01-01 RX ORDER — CEFAZOLIN SODIUM 1 G/50ML
INJECTION, SOLUTION INTRAVENOUS CONTINUOUS PRN
Status: COMPLETED | OUTPATIENT
Start: 2022-01-01 | End: 2022-01-01

## 2022-01-01 RX ORDER — ASPIRIN 81 MG/1
81 TABLET, CHEWABLE ORAL DAILY
Status: DISCONTINUED | OUTPATIENT
Start: 2022-01-01 | End: 2022-01-01 | Stop reason: HOSPADM

## 2022-01-01 RX ORDER — POLYETHYLENE GLYCOL 3350 17 G/17G
17 POWDER, FOR SOLUTION ORAL DAILY
Status: DISCONTINUED | OUTPATIENT
Start: 2022-01-01 | End: 2022-01-01 | Stop reason: HOSPADM

## 2022-01-01 RX ORDER — METHOCARBAMOL 500 MG/1
750 TABLET, FILM COATED ORAL EVERY 8 HOURS
Status: DISCONTINUED | OUTPATIENT
Start: 2022-01-01 | End: 2022-01-01

## 2022-01-01 RX ORDER — METOCLOPRAMIDE HYDROCHLORIDE 5 MG/ML
5 INJECTION INTRAMUSCULAR; INTRAVENOUS EVERY 6 HOURS SCHEDULED
Status: COMPLETED | OUTPATIENT
Start: 2022-01-01 | End: 2022-01-01

## 2022-01-01 RX ORDER — ACETAMINOPHEN 500 MG
1000 TABLET ORAL EVERY 8 HOURS SCHEDULED
Status: DISCONTINUED | OUTPATIENT
Start: 2022-01-01 | End: 2022-01-01

## 2022-01-01 RX ORDER — CHLORHEXIDINE GLUCONATE 0.12 MG/ML
15 RINSE ORAL 2 TIMES DAILY
Status: DISCONTINUED | OUTPATIENT
Start: 2022-01-01 | End: 2022-01-01

## 2022-01-01 RX ORDER — SPIRONOLACTONE 25 MG/1
25 TABLET ORAL DAILY
Status: DISCONTINUED | OUTPATIENT
Start: 2022-01-01 | End: 2022-01-01 | Stop reason: HOSPADM

## 2022-01-01 RX ORDER — LORAZEPAM 2 MG/ML
1 INJECTION INTRAMUSCULAR EVERY 6 HOURS PRN
Status: DISCONTINUED | OUTPATIENT
Start: 2022-01-01 | End: 2022-01-01

## 2022-01-01 RX ORDER — FENTANYL CITRATE 50 UG/ML
INJECTION, SOLUTION INTRAMUSCULAR; INTRAVENOUS
Status: DISCONTINUED
Start: 2022-01-01 | End: 2022-01-01

## 2022-01-01 RX ORDER — GLYCOPYRROLATE 0.2 MG/ML
0.2 INJECTION INTRAMUSCULAR; INTRAVENOUS EVERY 4 HOURS PRN
Status: DISCONTINUED | OUTPATIENT
Start: 2022-01-01 | End: 2022-01-01

## 2022-01-01 RX ORDER — METOPROLOL TARTRATE 5 MG/5ML
2.5 INJECTION INTRAVENOUS EVERY 6 HOURS PRN
Status: DISCONTINUED | OUTPATIENT
Start: 2022-01-01 | End: 2022-01-01

## 2022-01-01 RX ORDER — FENTANYL CITRATE 50 UG/ML
25 INJECTION, SOLUTION INTRAMUSCULAR; INTRAVENOUS EVERY 5 MIN PRN
Status: DISCONTINUED | OUTPATIENT
Start: 2022-01-01 | End: 2022-01-01 | Stop reason: HOSPADM

## 2022-01-01 RX ORDER — ALLOPURINOL 100 MG/1
100 TABLET ORAL DAILY
COMMUNITY

## 2022-01-01 RX ORDER — OXYCODONE HCL 5 MG/5 ML
5 SOLUTION, ORAL ORAL EVERY 6 HOURS PRN
Status: DISCONTINUED | OUTPATIENT
Start: 2022-01-01 | End: 2022-01-01

## 2022-01-01 RX ORDER — MORPHINE SULFATE 4 MG/ML
4 INJECTION, SOLUTION INTRAMUSCULAR; INTRAVENOUS
Status: DISCONTINUED | OUTPATIENT
Start: 2022-01-01 | End: 2022-01-01 | Stop reason: HOSPADM

## 2022-01-01 RX ORDER — AMOXICILLIN 500 MG
1 CAPSULE ORAL DAILY
COMMUNITY

## 2022-01-01 RX ORDER — METOCLOPRAMIDE HYDROCHLORIDE 5 MG/ML
10 INJECTION INTRAMUSCULAR; INTRAVENOUS EVERY 6 HOURS SCHEDULED
Status: DISCONTINUED | OUTPATIENT
Start: 2022-01-01 | End: 2022-01-01

## 2022-01-01 RX ORDER — ASPIRIN 81 MG/1
81 TABLET, CHEWABLE ORAL DAILY
COMMUNITY

## 2022-01-01 RX ORDER — INSULIN GLARGINE 100 [IU]/ML
15 INJECTION, SOLUTION SUBCUTANEOUS NIGHTLY
Status: DISCONTINUED | OUTPATIENT
Start: 2022-01-01 | End: 2022-01-01

## 2022-01-01 RX ORDER — MORPHINE SULFATE 2 MG/ML
2 INJECTION, SOLUTION INTRAMUSCULAR; INTRAVENOUS EVERY 5 MIN PRN
Status: DISCONTINUED | OUTPATIENT
Start: 2022-01-01 | End: 2022-01-01 | Stop reason: HOSPADM

## 2022-01-01 RX ORDER — DIPHENHYDRAMINE HYDROCHLORIDE 50 MG/ML
12.5 INJECTION INTRAMUSCULAR; INTRAVENOUS
Status: DISCONTINUED | OUTPATIENT
Start: 2022-01-01 | End: 2022-01-01 | Stop reason: HOSPADM

## 2022-01-01 RX ORDER — FENTANYL CITRATE 50 UG/ML
INJECTION, SOLUTION INTRAMUSCULAR; INTRAVENOUS
Status: DISPENSED
Start: 2022-01-01 | End: 2022-01-01

## 2022-01-01 RX ORDER — MAGNESIUM HYDROXIDE 1200 MG/15ML
LIQUID ORAL CONTINUOUS PRN
Status: COMPLETED | OUTPATIENT
Start: 2022-01-01 | End: 2022-01-01

## 2022-01-01 RX ORDER — PHENTOLAMINE MESYLATE 5 MG/1
5 INJECTION INTRAMUSCULAR; INTRAVENOUS ONCE
Status: COMPLETED | OUTPATIENT
Start: 2022-01-01 | End: 2022-01-01

## 2022-01-01 RX ORDER — CEFAZOLIN SODIUM 1 G/50ML
INJECTION, SOLUTION INTRAVENOUS
Status: DISPENSED
Start: 2022-01-01 | End: 2022-01-01

## 2022-01-01 RX ORDER — DEXMEDETOMIDINE HYDROCHLORIDE 4 UG/ML
0.2 INJECTION, SOLUTION INTRAVENOUS CONTINUOUS
Status: DISCONTINUED | OUTPATIENT
Start: 2022-01-01 | End: 2022-01-01

## 2022-01-01 RX ORDER — METHOCARBAMOL 750 MG/1
750 TABLET, FILM COATED ORAL EVERY 6 HOURS SCHEDULED
Status: DISCONTINUED | OUTPATIENT
Start: 2022-01-01 | End: 2022-01-01 | Stop reason: HOSPADM

## 2022-01-01 RX ORDER — SODIUM CHLORIDE, SODIUM LACTATE, POTASSIUM CHLORIDE, CALCIUM CHLORIDE 600; 310; 30; 20 MG/100ML; MG/100ML; MG/100ML; MG/100ML
INJECTION, SOLUTION INTRAVENOUS CONTINUOUS
Status: DISCONTINUED | OUTPATIENT
Start: 2022-01-01 | End: 2022-01-01

## 2022-01-01 RX ORDER — HEPARIN SODIUM 5000 [USP'U]/ML
5000 INJECTION, SOLUTION INTRAVENOUS; SUBCUTANEOUS EVERY 8 HOURS SCHEDULED
Status: DISCONTINUED | OUTPATIENT
Start: 2022-01-01 | End: 2022-01-01

## 2022-01-01 RX ORDER — SODIUM CHLORIDE, SODIUM LACTATE, POTASSIUM CHLORIDE, AND CALCIUM CHLORIDE .6; .31; .03; .02 G/100ML; G/100ML; G/100ML; G/100ML
500 INJECTION, SOLUTION INTRAVENOUS ONCE
Status: COMPLETED | OUTPATIENT
Start: 2022-01-01 | End: 2022-01-01

## 2022-01-01 RX ORDER — PROPOFOL 10 MG/ML
INJECTION, EMULSION INTRAVENOUS
Status: DISCONTINUED
Start: 2022-01-01 | End: 2022-01-01

## 2022-01-01 RX ORDER — ONDANSETRON 2 MG/ML
4 INJECTION INTRAMUSCULAR; INTRAVENOUS
Status: DISCONTINUED | OUTPATIENT
Start: 2022-01-01 | End: 2022-01-01 | Stop reason: HOSPADM

## 2022-01-01 RX ORDER — NOREPINEPHRINE BIT/0.9 % NACL 16MG/250ML
2-100 INFUSION BOTTLE (ML) INTRAVENOUS CONTINUOUS
Status: DISCONTINUED | OUTPATIENT
Start: 2022-01-01 | End: 2022-01-01

## 2022-01-01 RX ORDER — OXYCODONE HYDROCHLORIDE AND ACETAMINOPHEN 5; 325 MG/1; MG/1
1 TABLET ORAL PRN
Status: DISCONTINUED | OUTPATIENT
Start: 2022-01-01 | End: 2022-01-01 | Stop reason: HOSPADM

## 2022-01-01 RX ORDER — POTASSIUM CHLORIDE 20MEQ/15ML
20 LIQUID (ML) ORAL DAILY
Status: DISCONTINUED | OUTPATIENT
Start: 2022-01-01 | End: 2022-01-01 | Stop reason: CLARIF

## 2022-01-01 RX ORDER — INSULIN GLARGINE 100 [IU]/ML
15 INJECTION, SOLUTION SUBCUTANEOUS EVERY MORNING
Status: DISCONTINUED | OUTPATIENT
Start: 2022-01-01 | End: 2022-01-01

## 2022-01-01 RX ORDER — BUMETANIDE 1 MG/1
2 TABLET ORAL 2 TIMES DAILY
COMMUNITY

## 2022-01-01 RX ORDER — PYRIDOXINE HCL (VITAMIN B6) 100 MG
100 TABLET ORAL DAILY
COMMUNITY

## 2022-01-01 RX ORDER — GABAPENTIN 600 MG/1
300 TABLET ORAL 3 TIMES DAILY
Status: DISCONTINUED | OUTPATIENT
Start: 2022-01-01 | End: 2022-01-01

## 2022-01-01 RX ORDER — OMEPRAZOLE 20 MG/1
40 CAPSULE, DELAYED RELEASE ORAL DAILY
COMMUNITY

## 2022-01-01 RX ORDER — INSULIN GLARGINE 100 [IU]/ML
15 INJECTION, SOLUTION SUBCUTANEOUS NIGHTLY
COMMUNITY

## 2022-01-01 RX ORDER — AMLODIPINE BESYLATE 5 MG/1
5 TABLET ORAL DAILY
Status: DISCONTINUED | OUTPATIENT
Start: 2022-01-01 | End: 2022-01-01 | Stop reason: HOSPADM

## 2022-01-01 RX ORDER — METOCLOPRAMIDE HYDROCHLORIDE 5 MG/ML
5 INJECTION INTRAMUSCULAR; INTRAVENOUS EVERY 6 HOURS SCHEDULED
Status: DISCONTINUED | OUTPATIENT
Start: 2022-01-01 | End: 2022-01-01

## 2022-01-01 RX ORDER — MORPHINE SULFATE 4 MG/ML
4 INJECTION, SOLUTION INTRAMUSCULAR; INTRAVENOUS
Status: DISCONTINUED | OUTPATIENT
Start: 2022-01-01 | End: 2022-01-01

## 2022-01-01 RX ORDER — PROPOFOL 10 MG/ML
INJECTION, EMULSION INTRAVENOUS PRN
Status: DISCONTINUED | OUTPATIENT
Start: 2022-01-01 | End: 2022-01-01 | Stop reason: SDUPTHER

## 2022-01-01 RX ORDER — MORPHINE SULFATE 2 MG/ML
2 INJECTION, SOLUTION INTRAMUSCULAR; INTRAVENOUS
Status: DISCONTINUED | OUTPATIENT
Start: 2022-01-01 | End: 2022-01-01 | Stop reason: HOSPADM

## 2022-01-01 RX ORDER — LORAZEPAM 2 MG/ML
1 INJECTION INTRAMUSCULAR EVERY 4 HOURS PRN
Status: DISCONTINUED | OUTPATIENT
Start: 2022-01-01 | End: 2022-01-01

## 2022-01-01 RX ORDER — DEXTROSE MONOHYDRATE 50 MG/ML
100 INJECTION, SOLUTION INTRAVENOUS PRN
Status: DISCONTINUED | OUTPATIENT
Start: 2022-01-01 | End: 2022-01-01

## 2022-01-01 RX ORDER — MAGNESIUM OXIDE 400 MG/1
400 TABLET ORAL DAILY
COMMUNITY

## 2022-01-01 RX ORDER — OXYCODONE HYDROCHLORIDE 5 MG/1
5 TABLET ORAL EVERY 6 HOURS PRN
Status: DISCONTINUED | OUTPATIENT
Start: 2022-01-01 | End: 2022-01-01

## 2022-01-01 RX ORDER — OXYCODONE HCL 5 MG/5 ML
5 SOLUTION, ORAL ORAL EVERY 6 HOURS
Status: DISCONTINUED | OUTPATIENT
Start: 2022-01-01 | End: 2022-01-01

## 2022-01-01 RX ORDER — ACETAMINOPHEN 500 MG
1000 TABLET ORAL EVERY 8 HOURS
Status: DISCONTINUED | OUTPATIENT
Start: 2022-01-01 | End: 2022-01-01 | Stop reason: HOSPADM

## 2022-01-01 RX ADMIN — OXYCODONE 5 MG: 5 TABLET ORAL at 14:58

## 2022-01-01 RX ADMIN — ROCURONIUM BROMIDE 50 MG: 10 INJECTION INTRAVENOUS at 12:51

## 2022-01-01 RX ADMIN — Medication 4 MCG/MIN: at 21:10

## 2022-01-01 RX ADMIN — PRIMIDONE 50 MG: 50 TABLET ORAL at 13:11

## 2022-01-01 RX ADMIN — METHOCARBAMOL TABLETS 750 MG: 750 TABLET, COATED ORAL at 11:40

## 2022-01-01 RX ADMIN — ACETAMINOPHEN 1000 MG: 500 TABLET ORAL at 09:42

## 2022-01-01 RX ADMIN — PHENYLEPHRINE HYDROCHLORIDE 100 MCG: 10 INJECTION INTRAVENOUS at 17:36

## 2022-01-01 RX ADMIN — ACETAMINOPHEN 1000 MG: 500 TABLET ORAL at 04:32

## 2022-01-01 RX ADMIN — POTASSIUM BICARBONATE 20 MEQ: 782 TABLET, EFFERVESCENT ORAL at 10:02

## 2022-01-01 RX ADMIN — DIGOXIN 125 MCG: 0.25 INJECTION INTRAMUSCULAR; INTRAVENOUS at 12:51

## 2022-01-01 RX ADMIN — POLYETHYLENE GLYCOL 3350 17 G: 17 POWDER, FOR SOLUTION ORAL at 09:22

## 2022-01-01 RX ADMIN — ACETAMINOPHEN 1000 MG: 500 TABLET ORAL at 09:47

## 2022-01-01 RX ADMIN — PRIMIDONE 50 MG: 50 TABLET ORAL at 20:30

## 2022-01-01 RX ADMIN — PHENYLEPHRINE HYDROCHLORIDE 200 MCG: 10 INJECTION INTRAVENOUS at 17:48

## 2022-01-01 RX ADMIN — SODIUM CHLORIDE, PRESERVATIVE FREE 10 ML: 5 INJECTION INTRAVENOUS at 21:00

## 2022-01-01 RX ADMIN — DIGOXIN 125 MCG: 0.25 INJECTION INTRAMUSCULAR; INTRAVENOUS at 14:08

## 2022-01-01 RX ADMIN — METHOCARBAMOL TABLETS 750 MG: 750 TABLET, COATED ORAL at 23:29

## 2022-01-01 RX ADMIN — PRIMIDONE 50 MG: 50 TABLET ORAL at 08:16

## 2022-01-01 RX ADMIN — PHENTOLAMINE MESYLATE 5 MG: 5 INJECTION, POWDER, FOR SOLUTION INTRAMUSCULAR; INTRAVENOUS at 16:42

## 2022-01-01 RX ADMIN — INSULIN LISPRO 3 UNITS: 100 INJECTION, SOLUTION INTRAVENOUS; SUBCUTANEOUS at 16:05

## 2022-01-01 RX ADMIN — PHENYLEPHRINE HYDROCHLORIDE 100 MCG: 10 INJECTION INTRAVENOUS at 15:06

## 2022-01-01 RX ADMIN — PRIMIDONE 50 MG: 50 TABLET ORAL at 13:31

## 2022-01-01 RX ADMIN — FUROSEMIDE 40 MG: 10 INJECTION, SOLUTION INTRAMUSCULAR; INTRAVENOUS at 13:11

## 2022-01-01 RX ADMIN — HEPARIN SODIUM 5000 UNITS: 5000 INJECTION, SOLUTION INTRAVENOUS; SUBCUTANEOUS at 14:19

## 2022-01-01 RX ADMIN — METHOCARBAMOL TABLETS 750 MG: 750 TABLET, COATED ORAL at 22:48

## 2022-01-01 RX ADMIN — PREGABALIN 100 MG: 100 CAPSULE ORAL at 05:56

## 2022-01-01 RX ADMIN — ASPIRIN 81 MG: 81 TABLET, CHEWABLE ORAL at 16:05

## 2022-01-01 RX ADMIN — AMIODARONE HYDROCHLORIDE 0.5 MG/MIN: 50 INJECTION, SOLUTION INTRAVENOUS at 08:18

## 2022-01-01 RX ADMIN — POTASSIUM BICARBONATE 20 MEQ: 782 TABLET, EFFERVESCENT ORAL at 14:07

## 2022-01-01 RX ADMIN — DIGOXIN 125 MCG: 0.25 INJECTION INTRAMUSCULAR; INTRAVENOUS at 16:19

## 2022-01-01 RX ADMIN — SODIUM CHLORIDE, POTASSIUM CHLORIDE, SODIUM LACTATE AND CALCIUM CHLORIDE: 600; 310; 30; 20 INJECTION, SOLUTION INTRAVENOUS at 14:13

## 2022-01-01 RX ADMIN — SODIUM PHOSPHATE, MONOBASIC, MONOHYDRATE 20 MMOL: 276; 142 INJECTION, SOLUTION INTRAVENOUS at 15:44

## 2022-01-01 RX ADMIN — PHENYLEPHRINE HYDROCHLORIDE 200 MCG: 10 INJECTION INTRAVENOUS at 16:10

## 2022-01-01 RX ADMIN — SODIUM CHLORIDE, PRESERVATIVE FREE 10 ML: 5 INJECTION INTRAVENOUS at 22:33

## 2022-01-01 RX ADMIN — INSULIN LISPRO 3 UNITS: 100 INJECTION, SOLUTION INTRAVENOUS; SUBCUTANEOUS at 20:04

## 2022-01-01 RX ADMIN — PHENYLEPHRINE HYDROCHLORIDE 200 MCG: 10 INJECTION INTRAVENOUS at 16:53

## 2022-01-01 RX ADMIN — METOCLOPRAMIDE 5 MG: 5 INJECTION, SOLUTION INTRAMUSCULAR; INTRAVENOUS at 17:37

## 2022-01-01 RX ADMIN — INSULIN LISPRO 3 UNITS: 100 INJECTION, SOLUTION INTRAVENOUS; SUBCUTANEOUS at 00:04

## 2022-01-01 RX ADMIN — ROCURONIUM BROMIDE 10 MG: 10 INJECTION INTRAVENOUS at 15:18

## 2022-01-01 RX ADMIN — MORPHINE SULFATE 4 MG: 4 INJECTION INTRAVENOUS at 06:14

## 2022-01-01 RX ADMIN — HEPARIN SODIUM 5000 UNITS: 5000 INJECTION, SOLUTION INTRAVENOUS; SUBCUTANEOUS at 13:31

## 2022-01-01 RX ADMIN — METOPROLOL TARTRATE 12.5 MG: 25 TABLET ORAL at 08:16

## 2022-01-01 RX ADMIN — SODIUM CHLORIDE, POTASSIUM CHLORIDE, SODIUM LACTATE AND CALCIUM CHLORIDE 500 ML: 600; 310; 30; 20 INJECTION, SOLUTION INTRAVENOUS at 21:07

## 2022-01-01 RX ADMIN — INSULIN LISPRO 3 UNITS: 100 INJECTION, SOLUTION INTRAVENOUS; SUBCUTANEOUS at 00:41

## 2022-01-01 RX ADMIN — METHOCARBAMOL TABLETS 750 MG: 750 TABLET, COATED ORAL at 15:01

## 2022-01-01 RX ADMIN — SODIUM CHLORIDE, PRESERVATIVE FREE 10 ML: 5 INJECTION INTRAVENOUS at 09:48

## 2022-01-01 RX ADMIN — SODIUM CHLORIDE, PRESERVATIVE FREE 10 ML: 5 INJECTION INTRAVENOUS at 20:17

## 2022-01-01 RX ADMIN — DEXTROSE MONOHYDRATE 2000 MG: 50 INJECTION, SOLUTION INTRAVENOUS at 18:15

## 2022-01-01 RX ADMIN — PROPOFOL 10 MCG/KG/MIN: 10 INJECTION, EMULSION INTRAVENOUS at 03:19

## 2022-01-01 RX ADMIN — ROCURONIUM BROMIDE 20 MG: 10 INJECTION INTRAVENOUS at 13:42

## 2022-01-01 RX ADMIN — INSULIN LISPRO 3 UNITS: 100 INJECTION, SOLUTION INTRAVENOUS; SUBCUTANEOUS at 12:04

## 2022-01-01 RX ADMIN — VASOPRESSIN 0.03 UNITS/MIN: 20 INJECTION INTRAVENOUS at 15:44

## 2022-01-01 RX ADMIN — MORPHINE SULFATE 4 MG: 4 INJECTION INTRAVENOUS at 05:56

## 2022-01-01 RX ADMIN — INSULIN LISPRO 3 UNITS: 100 INJECTION, SOLUTION INTRAVENOUS; SUBCUTANEOUS at 12:22

## 2022-01-01 RX ADMIN — DEXTROSE MONOHYDRATE 2000 MG: 50 INJECTION, SOLUTION INTRAVENOUS at 00:25

## 2022-01-01 RX ADMIN — INSULIN GLARGINE 15 UNITS: 100 INJECTION, SOLUTION SUBCUTANEOUS at 15:23

## 2022-01-01 RX ADMIN — SODIUM CHLORIDE, POTASSIUM CHLORIDE, SODIUM LACTATE AND CALCIUM CHLORIDE: 600; 310; 30; 20 INJECTION, SOLUTION INTRAVENOUS at 00:10

## 2022-01-01 RX ADMIN — INSULIN GLARGINE 15 UNITS: 100 INJECTION, SOLUTION SUBCUTANEOUS at 10:02

## 2022-01-01 RX ADMIN — METHOCARBAMOL TABLETS 750 MG: 750 TABLET, COATED ORAL at 23:23

## 2022-01-01 RX ADMIN — DEXTROSE MONOHYDRATE 2000 MG: 50 INJECTION, SOLUTION INTRAVENOUS at 08:30

## 2022-01-01 RX ADMIN — PHENYLEPHRINE HYDROCHLORIDE 100 MCG: 10 INJECTION INTRAVENOUS at 16:29

## 2022-01-01 RX ADMIN — FENTANYL CITRATE 50 MCG: 50 INJECTION, SOLUTION INTRAMUSCULAR; INTRAVENOUS at 13:24

## 2022-01-01 RX ADMIN — PRIMIDONE 50 MG: 50 TABLET ORAL at 12:27

## 2022-01-01 RX ADMIN — OXYCODONE 5 MG: 5 TABLET ORAL at 22:47

## 2022-01-01 RX ADMIN — MORPHINE SULFATE 2 MG: 4 INJECTION INTRAVENOUS at 14:10

## 2022-01-01 RX ADMIN — PROPOFOL 20 MG: 10 INJECTION, EMULSION INTRAVENOUS at 19:09

## 2022-01-01 RX ADMIN — OXYCODONE HYDROCHLORIDE 5 MG: 5 SOLUTION ORAL at 08:22

## 2022-01-01 RX ADMIN — METHOCARBAMOL TABLETS 750 MG: 750 TABLET, COATED ORAL at 04:32

## 2022-01-01 RX ADMIN — HEPARIN SODIUM 5000 UNITS: 5000 INJECTION, SOLUTION INTRAVENOUS; SUBCUTANEOUS at 05:30

## 2022-01-01 RX ADMIN — METHOCARBAMOL TABLETS 750 MG: 750 TABLET, COATED ORAL at 05:49

## 2022-01-01 RX ADMIN — LEVOTHYROXINE SODIUM 25 MCG: 25 TABLET ORAL at 05:31

## 2022-01-01 RX ADMIN — METHOCARBAMOL TABLETS 750 MG: 750 TABLET, COATED ORAL at 05:17

## 2022-01-01 RX ADMIN — ACETAMINOPHEN 1000 MG: 500 TABLET ORAL at 22:47

## 2022-01-01 RX ADMIN — PREGABALIN 100 MG: 100 CAPSULE ORAL at 05:30

## 2022-01-01 RX ADMIN — SPIRONOLACTONE 25 MG: 25 TABLET ORAL at 16:05

## 2022-01-01 RX ADMIN — PHENYLEPHRINE HYDROCHLORIDE 100 MCG: 10 INJECTION INTRAVENOUS at 19:15

## 2022-01-01 RX ADMIN — DEXTROSE MONOHYDRATE 2000 MG: 50 INJECTION, SOLUTION INTRAVENOUS at 04:32

## 2022-01-01 RX ADMIN — INSULIN LISPRO 3 UNITS: 100 INJECTION, SOLUTION INTRAVENOUS; SUBCUTANEOUS at 15:54

## 2022-01-01 RX ADMIN — METOCLOPRAMIDE 5 MG: 5 INJECTION, SOLUTION INTRAMUSCULAR; INTRAVENOUS at 06:24

## 2022-01-01 RX ADMIN — MAGNESIUM SULFATE 2000 MG: 2 INJECTION INTRAVENOUS at 21:51

## 2022-01-01 RX ADMIN — PHENYLEPHRINE HYDROCHLORIDE 100 MCG: 10 INJECTION INTRAVENOUS at 12:55

## 2022-01-01 RX ADMIN — SODIUM CHLORIDE: 9 INJECTION, SOLUTION INTRAVENOUS at 17:31

## 2022-01-01 RX ADMIN — POLYETHYLENE GLYCOL 3350 17 G: 17 POWDER, FOR SOLUTION ORAL at 10:42

## 2022-01-01 RX ADMIN — METHOCARBAMOL TABLETS 750 MG: 750 TABLET, COATED ORAL at 05:56

## 2022-01-01 RX ADMIN — TETANUS TOXOID, REDUCED DIPHTHERIA TOXOID AND ACELLULAR PERTUSSIS VACCINE, ADSORBED 0.5 ML: 5; 2.5; 8; 8; 2.5 SUSPENSION INTRAMUSCULAR at 11:55

## 2022-01-01 RX ADMIN — SODIUM CHLORIDE: 9 INJECTION, SOLUTION INTRAVENOUS at 09:41

## 2022-01-01 RX ADMIN — PREGABALIN 100 MG: 100 CAPSULE ORAL at 21:30

## 2022-01-01 RX ADMIN — FUROSEMIDE 40 MG: 10 INJECTION, SOLUTION INTRAMUSCULAR; INTRAVENOUS at 09:42

## 2022-01-01 RX ADMIN — FENTANYL CITRATE 50 MCG: 50 INJECTION, SOLUTION INTRAMUSCULAR; INTRAVENOUS at 13:17

## 2022-01-01 RX ADMIN — PHENYLEPHRINE HYDROCHLORIDE 200 MCG: 10 INJECTION INTRAVENOUS at 12:58

## 2022-01-01 RX ADMIN — CEFAZOLIN 2000 MG: 330 INJECTION, POWDER, FOR SOLUTION INTRAMUSCULAR; INTRAVENOUS at 17:01

## 2022-01-01 RX ADMIN — LEVOTHYROXINE SODIUM 25 MCG: 25 TABLET ORAL at 10:31

## 2022-01-01 RX ADMIN — ACETAMINOPHEN 1000 MG: 500 TABLET ORAL at 08:22

## 2022-01-01 RX ADMIN — METOPROLOL TARTRATE 12.5 MG: 25 TABLET ORAL at 09:46

## 2022-01-01 RX ADMIN — SODIUM CHLORIDE, PRESERVATIVE FREE 10 ML: 5 INJECTION INTRAVENOUS at 20:12

## 2022-01-01 RX ADMIN — METOCLOPRAMIDE 5 MG: 5 INJECTION, SOLUTION INTRAMUSCULAR; INTRAVENOUS at 00:40

## 2022-01-01 RX ADMIN — POTASSIUM BICARBONATE 20 MEQ: 782 TABLET, EFFERVESCENT ORAL at 08:19

## 2022-01-01 RX ADMIN — METOCLOPRAMIDE 5 MG: 5 INJECTION, SOLUTION INTRAMUSCULAR; INTRAVENOUS at 10:31

## 2022-01-01 RX ADMIN — OXYCODONE HYDROCHLORIDE 5 MG: 5 SOLUTION ORAL at 09:41

## 2022-01-01 RX ADMIN — ROCURONIUM BROMIDE 20 MG: 10 INJECTION INTRAVENOUS at 14:16

## 2022-01-01 RX ADMIN — METOCLOPRAMIDE 5 MG: 5 INJECTION, SOLUTION INTRAMUSCULAR; INTRAVENOUS at 12:05

## 2022-01-01 RX ADMIN — CHLORHEXIDINE GLUCONATE 0.12% ORAL RINSE 15 ML: 1.2 LIQUID ORAL at 20:18

## 2022-01-01 RX ADMIN — GABAPENTIN 300 MG: 600 TABLET ORAL at 09:22

## 2022-01-01 RX ADMIN — PHENYLEPHRINE HYDROCHLORIDE 200 MCG: 10 INJECTION INTRAVENOUS at 13:06

## 2022-01-01 RX ADMIN — FENTANYL CITRATE 50 MCG: 50 INJECTION, SOLUTION INTRAMUSCULAR; INTRAVENOUS at 12:55

## 2022-01-01 RX ADMIN — DIGOXIN 125 MCG: 0.25 INJECTION INTRAMUSCULAR; INTRAVENOUS at 11:03

## 2022-01-01 RX ADMIN — METOCLOPRAMIDE 5 MG: 5 INJECTION, SOLUTION INTRAMUSCULAR; INTRAVENOUS at 23:23

## 2022-01-01 RX ADMIN — DEXMEDETOMIDINE HYDROCHLORIDE 0.3 MCG/KG/HR: 4 INJECTION, SOLUTION INTRAVENOUS at 08:03

## 2022-01-01 RX ADMIN — PREGABALIN 100 MG: 100 CAPSULE ORAL at 23:29

## 2022-01-01 RX ADMIN — INSULIN LISPRO 6 UNITS: 100 INJECTION, SOLUTION INTRAVENOUS; SUBCUTANEOUS at 09:48

## 2022-01-01 RX ADMIN — ROCURONIUM BROMIDE 20 MG: 10 INJECTION INTRAVENOUS at 17:09

## 2022-01-01 RX ADMIN — PRIMIDONE 50 MG: 50 TABLET ORAL at 20:51

## 2022-01-01 RX ADMIN — ACETAMINOPHEN 1000 MG: 500 TABLET ORAL at 00:41

## 2022-01-01 RX ADMIN — ASPIRIN 81 MG: 81 TABLET, CHEWABLE ORAL at 08:16

## 2022-01-01 RX ADMIN — MORPHINE SULFATE 2 MG: 4 INJECTION INTRAVENOUS at 12:08

## 2022-01-01 RX ADMIN — INSULIN LISPRO 3 UNITS: 100 INJECTION, SOLUTION INTRAVENOUS; SUBCUTANEOUS at 16:19

## 2022-01-01 RX ADMIN — FENTANYL CITRATE 25 MCG: 50 INJECTION, SOLUTION INTRAMUSCULAR; INTRAVENOUS at 17:18

## 2022-01-01 RX ADMIN — FUROSEMIDE 20 MG: 10 INJECTION, SOLUTION INTRAMUSCULAR; INTRAVENOUS at 13:58

## 2022-01-01 RX ADMIN — PREGABALIN 100 MG: 100 CAPSULE ORAL at 05:49

## 2022-01-01 RX ADMIN — MORPHINE SULFATE 4 MG: 4 INJECTION INTRAVENOUS at 17:58

## 2022-01-01 RX ADMIN — ALBUMIN (HUMAN) 25 G: 12.5 INJECTION, SOLUTION INTRAVENOUS at 17:52

## 2022-01-01 RX ADMIN — FENTANYL CITRATE 50 MCG: 50 INJECTION, SOLUTION INTRAMUSCULAR; INTRAVENOUS at 13:25

## 2022-01-01 RX ADMIN — METOCLOPRAMIDE 5 MG: 5 INJECTION, SOLUTION INTRAMUSCULAR; INTRAVENOUS at 17:29

## 2022-01-01 RX ADMIN — METHOCARBAMOL TABLETS 750 MG: 750 TABLET, COATED ORAL at 11:44

## 2022-01-01 RX ADMIN — INSULIN LISPRO 3 UNITS: 100 INJECTION, SOLUTION INTRAVENOUS; SUBCUTANEOUS at 23:30

## 2022-01-01 RX ADMIN — CEFAZOLIN SODIUM 3000 MG: 1 INJECTION, SOLUTION INTRAVENOUS at 11:56

## 2022-01-01 RX ADMIN — SODIUM CHLORIDE, POTASSIUM CHLORIDE, SODIUM LACTATE AND CALCIUM CHLORIDE: 600; 310; 30; 20 INJECTION, SOLUTION INTRAVENOUS at 05:15

## 2022-01-01 RX ADMIN — CALCIUM GLUCONATE 3000 MG: 98 INJECTION, SOLUTION INTRAVENOUS at 00:11

## 2022-01-01 RX ADMIN — ACETAMINOPHEN 1000 MG: 500 TABLET ORAL at 17:51

## 2022-01-01 RX ADMIN — MORPHINE SULFATE 4 MG: 4 INJECTION INTRAVENOUS at 17:04

## 2022-01-01 RX ADMIN — METHOCARBAMOL TABLETS 750 MG: 750 TABLET, COATED ORAL at 17:51

## 2022-01-01 RX ADMIN — Medication 50 MCG/HR: at 23:07

## 2022-01-01 RX ADMIN — SODIUM CHLORIDE, PRESERVATIVE FREE 10 ML: 5 INJECTION INTRAVENOUS at 20:05

## 2022-01-01 RX ADMIN — INSULIN LISPRO 3 UNITS: 100 INJECTION, SOLUTION INTRAVENOUS; SUBCUTANEOUS at 04:22

## 2022-01-01 RX ADMIN — OXYCODONE HYDROCHLORIDE 5 MG: 5 SOLUTION ORAL at 21:30

## 2022-01-01 RX ADMIN — METOCLOPRAMIDE 5 MG: 5 INJECTION, SOLUTION INTRAMUSCULAR; INTRAVENOUS at 05:32

## 2022-01-01 RX ADMIN — CHLORHEXIDINE GLUCONATE 0.12% ORAL RINSE 15 ML: 1.2 LIQUID ORAL at 10:37

## 2022-01-01 RX ADMIN — SODIUM CHLORIDE, PRESERVATIVE FREE 10 ML: 5 INJECTION INTRAVENOUS at 08:18

## 2022-01-01 RX ADMIN — LEVOTHYROXINE SODIUM 25 MCG: 25 TABLET ORAL at 05:17

## 2022-01-01 RX ADMIN — PHENYLEPHRINE HYDROCHLORIDE 200 MCG: 10 INJECTION INTRAVENOUS at 16:43

## 2022-01-01 RX ADMIN — METOCLOPRAMIDE 5 MG: 5 INJECTION, SOLUTION INTRAMUSCULAR; INTRAVENOUS at 17:51

## 2022-01-01 RX ADMIN — METOCLOPRAMIDE 5 MG: 5 INJECTION, SOLUTION INTRAMUSCULAR; INTRAVENOUS at 11:44

## 2022-01-01 RX ADMIN — AMIODARONE HYDROCHLORIDE 1 MG/MIN: 50 INJECTION, SOLUTION INTRAVENOUS at 22:55

## 2022-01-01 RX ADMIN — POLYETHYLENE GLYCOL 3350 17 G: 17 POWDER, FOR SOLUTION ORAL at 08:22

## 2022-01-01 RX ADMIN — PREGABALIN 100 MG: 100 CAPSULE ORAL at 20:51

## 2022-01-01 RX ADMIN — PREGABALIN 100 MG: 100 CAPSULE ORAL at 13:11

## 2022-01-01 RX ADMIN — PHENYLEPHRINE HYDROCHLORIDE 200 MCG: 10 INJECTION INTRAVENOUS at 19:10

## 2022-01-01 RX ADMIN — CEFAZOLIN 2000 MG: 330 INJECTION, POWDER, FOR SOLUTION INTRAMUSCULAR; INTRAVENOUS at 13:01

## 2022-01-01 RX ADMIN — POLYETHYLENE GLYCOL 3350 17 G: 17 POWDER, FOR SOLUTION ORAL at 09:43

## 2022-01-01 RX ADMIN — MORPHINE SULFATE 4 MG: 4 INJECTION INTRAVENOUS at 20:07

## 2022-01-01 RX ADMIN — HEPARIN SODIUM 5000 UNITS: 5000 INJECTION, SOLUTION INTRAVENOUS; SUBCUTANEOUS at 21:30

## 2022-01-01 RX ADMIN — MORPHINE SULFATE 4 MG: 4 INJECTION INTRAVENOUS at 14:06

## 2022-01-01 RX ADMIN — POTASSIUM BICARBONATE 20 MEQ: 782 TABLET, EFFERVESCENT ORAL at 09:47

## 2022-01-01 RX ADMIN — METOPROLOL TARTRATE 12.5 MG: 25 TABLET ORAL at 23:29

## 2022-01-01 RX ADMIN — Medication 75 MCG/HR: at 20:29

## 2022-01-01 RX ADMIN — AMLODIPINE BESYLATE 5 MG: 5 TABLET ORAL at 08:15

## 2022-01-01 RX ADMIN — ROCURONIUM BROMIDE 10 MG: 10 INJECTION INTRAVENOUS at 15:12

## 2022-01-01 RX ADMIN — FENTANYL CITRATE 25 MCG: 50 INJECTION, SOLUTION INTRAMUSCULAR; INTRAVENOUS at 17:25

## 2022-01-01 RX ADMIN — MORPHINE SULFATE 4 MG: 4 INJECTION INTRAVENOUS at 09:48

## 2022-01-01 RX ADMIN — OXYCODONE 5 MG: 5 TABLET ORAL at 17:29

## 2022-01-01 RX ADMIN — PREGABALIN 100 MG: 100 CAPSULE ORAL at 14:10

## 2022-01-01 RX ADMIN — PRIMIDONE 50 MG: 50 TABLET ORAL at 23:29

## 2022-01-01 RX ADMIN — ACETAMINOPHEN 1000 MG: 500 TABLET ORAL at 14:58

## 2022-01-01 RX ADMIN — PRIMIDONE 50 MG: 50 TABLET ORAL at 09:43

## 2022-01-01 RX ADMIN — MORPHINE SULFATE 2 MG: 4 INJECTION INTRAVENOUS at 13:55

## 2022-01-01 RX ADMIN — OXYCODONE 5 MG: 5 TABLET ORAL at 10:46

## 2022-01-01 RX ADMIN — DIGOXIN 125 MCG: 0.25 INJECTION INTRAMUSCULAR; INTRAVENOUS at 02:01

## 2022-01-01 RX ADMIN — AMLODIPINE BESYLATE 5 MG: 5 TABLET ORAL at 16:05

## 2022-01-01 RX ADMIN — ACETAMINOPHEN 1000 MG: 500 TABLET ORAL at 16:50

## 2022-01-01 RX ADMIN — FAMOTIDINE 20 MG: 10 INJECTION INTRAVENOUS at 08:30

## 2022-01-01 RX ADMIN — MAGNESIUM SULFATE 2000 MG: 2 INJECTION INTRAVENOUS at 10:04

## 2022-01-01 RX ADMIN — POLYETHYLENE GLYCOL 3350 17 G: 17 POWDER, FOR SOLUTION ORAL at 10:02

## 2022-01-01 RX ADMIN — SODIUM CHLORIDE, POTASSIUM CHLORIDE, SODIUM LACTATE AND CALCIUM CHLORIDE: 600; 310; 30; 20 INJECTION, SOLUTION INTRAVENOUS at 17:25

## 2022-01-01 RX ADMIN — PREGABALIN 100 MG: 100 CAPSULE ORAL at 10:31

## 2022-01-01 RX ADMIN — PREGABALIN 100 MG: 100 CAPSULE ORAL at 13:31

## 2022-01-01 RX ADMIN — INSULIN LISPRO 3 UNITS: 100 INJECTION, SOLUTION INTRAVENOUS; SUBCUTANEOUS at 08:27

## 2022-01-01 RX ADMIN — SODIUM CHLORIDE, PRESERVATIVE FREE 10 ML: 5 INJECTION INTRAVENOUS at 09:23

## 2022-01-01 RX ADMIN — Medication 10 MCG/MIN: at 05:43

## 2022-01-01 RX ADMIN — ACETAMINOPHEN 1000 MG: 500 TABLET ORAL at 10:02

## 2022-01-01 RX ADMIN — AMIODARONE HYDROCHLORIDE 0.5 MG/MIN: 50 INJECTION, SOLUTION INTRAVENOUS at 01:07

## 2022-01-01 RX ADMIN — CHLORHEXIDINE GLUCONATE 0.12% ORAL RINSE 15 ML: 1.2 LIQUID ORAL at 09:42

## 2022-01-01 RX ADMIN — HEPARIN SODIUM 5000 UNITS: 5000 INJECTION, SOLUTION INTRAVENOUS; SUBCUTANEOUS at 06:00

## 2022-01-01 RX ADMIN — PRIMIDONE 50 MG: 50 TABLET ORAL at 08:22

## 2022-01-01 RX ADMIN — POTASSIUM BICARBONATE 20 MEQ: 782 TABLET, EFFERVESCENT ORAL at 10:19

## 2022-01-01 RX ADMIN — PHENYLEPHRINE HYDROCHLORIDE 200 MCG: 10 INJECTION INTRAVENOUS at 17:31

## 2022-01-01 RX ADMIN — ACETAMINOPHEN 1000 MG: 500 TABLET ORAL at 01:45

## 2022-01-01 RX ADMIN — LEVOTHYROXINE SODIUM 25 MCG: 25 TABLET ORAL at 05:48

## 2022-01-01 RX ADMIN — FAMOTIDINE 20 MG: 10 INJECTION INTRAVENOUS at 09:42

## 2022-01-01 RX ADMIN — PHENYLEPHRINE HYDROCHLORIDE 100 MCG: 10 INJECTION INTRAVENOUS at 16:01

## 2022-01-01 RX ADMIN — DEXMEDETOMIDINE HYDROCHLORIDE 0.3 MCG/KG/HR: 4 INJECTION, SOLUTION INTRAVENOUS at 21:10

## 2022-01-01 RX ADMIN — PHENYLEPHRINE HYDROCHLORIDE 200 MCG: 10 INJECTION INTRAVENOUS at 17:57

## 2022-01-01 RX ADMIN — Medication 75 MCG/HR: at 09:55

## 2022-01-01 RX ADMIN — INSULIN LISPRO 3 UNITS: 100 INJECTION, SOLUTION INTRAVENOUS; SUBCUTANEOUS at 20:54

## 2022-01-01 RX ADMIN — MORPHINE SULFATE 4 MG: 4 INJECTION INTRAVENOUS at 11:46

## 2022-01-01 RX ADMIN — ACETAMINOPHEN 1000 MG: 500 TABLET ORAL at 10:42

## 2022-01-01 RX ADMIN — GABAPENTIN 300 MG: 600 TABLET ORAL at 22:48

## 2022-01-01 RX ADMIN — INSULIN LISPRO 3 UNITS: 100 INJECTION, SOLUTION INTRAVENOUS; SUBCUTANEOUS at 04:20

## 2022-01-01 RX ADMIN — PHENYLEPHRINE HYDROCHLORIDE 100 MCG: 10 INJECTION INTRAVENOUS at 15:18

## 2022-01-01 RX ADMIN — FENTANYL CITRATE 25 MCG: 50 INJECTION, SOLUTION INTRAMUSCULAR; INTRAVENOUS at 18:26

## 2022-01-01 RX ADMIN — POLYETHYLENE GLYCOL 3350 17 G: 17 POWDER, FOR SOLUTION ORAL at 09:48

## 2022-01-01 RX ADMIN — ACETAMINOPHEN 1000 MG: 500 TABLET ORAL at 00:48

## 2022-01-01 RX ADMIN — SODIUM CHLORIDE, PRESERVATIVE FREE 10 ML: 5 INJECTION INTRAVENOUS at 07:33

## 2022-01-01 RX ADMIN — OXYCODONE HYDROCHLORIDE 5 MG: 5 SOLUTION ORAL at 17:19

## 2022-01-01 RX ADMIN — INSULIN LISPRO 6 UNITS: 100 INJECTION, SOLUTION INTRAVENOUS; SUBCUTANEOUS at 10:02

## 2022-01-01 RX ADMIN — OXYCODONE 5 MG: 5 TABLET ORAL at 04:31

## 2022-01-01 RX ADMIN — SODIUM CHLORIDE, POTASSIUM CHLORIDE, SODIUM LACTATE AND CALCIUM CHLORIDE: 600; 310; 30; 20 INJECTION, SOLUTION INTRAVENOUS at 19:33

## 2022-01-01 RX ADMIN — METOPROLOL TARTRATE 12.5 MG: 25 TABLET ORAL at 12:42

## 2022-01-01 RX ADMIN — MORPHINE SULFATE 4 MG: 4 INJECTION INTRAVENOUS at 22:28

## 2022-01-01 RX ADMIN — FENTANYL CITRATE 50 MCG: 50 INJECTION, SOLUTION INTRAMUSCULAR; INTRAVENOUS at 11:46

## 2022-01-01 RX ADMIN — LEVOTHYROXINE SODIUM 25 MCG: 25 TABLET ORAL at 05:56

## 2022-01-01 RX ADMIN — PHENYLEPHRINE HYDROCHLORIDE 200 MCG: 10 INJECTION INTRAVENOUS at 17:51

## 2022-01-01 RX ADMIN — ACETAMINOPHEN 1000 MG: 500 TABLET ORAL at 01:00

## 2022-01-01 RX ADMIN — INSULIN LISPRO 6 UNITS: 100 INJECTION, SOLUTION INTRAVENOUS; SUBCUTANEOUS at 12:08

## 2022-01-01 RX ADMIN — METOPROLOL TARTRATE 2.5 MG: 5 INJECTION INTRAVENOUS at 03:52

## 2022-01-01 RX ADMIN — Medication 100 MCG/HR: at 07:58

## 2022-01-01 RX ADMIN — SPIRONOLACTONE 25 MG: 25 TABLET ORAL at 08:17

## 2022-01-01 RX ADMIN — AMIODARONE HYDROCHLORIDE 150 MG: 50 INJECTION, SOLUTION INTRAVENOUS at 22:42

## 2022-01-01 RX ADMIN — METOCLOPRAMIDE 5 MG: 5 INJECTION, SOLUTION INTRAMUSCULAR; INTRAVENOUS at 23:57

## 2022-01-01 RX ADMIN — INSULIN LISPRO 3 UNITS: 100 INJECTION, SOLUTION INTRAVENOUS; SUBCUTANEOUS at 03:44

## 2022-01-01 RX ADMIN — FENTANYL CITRATE 25 MCG: 50 INJECTION, SOLUTION INTRAMUSCULAR; INTRAVENOUS at 18:36

## 2022-01-01 RX ADMIN — PROPOFOL 100 MG: 10 INJECTION, EMULSION INTRAVENOUS at 12:51

## 2022-01-01 RX ADMIN — SODIUM CHLORIDE, PRESERVATIVE FREE 10 ML: 5 INJECTION INTRAVENOUS at 08:36

## 2022-01-01 RX ADMIN — PHENYLEPHRINE HYDROCHLORIDE 100 MCG/MIN: 10 INJECTION INTRAVENOUS at 13:13

## 2022-01-01 RX ADMIN — LIDOCAINE HYDROCHLORIDE 50 MG: 10 INJECTION, SOLUTION EPIDURAL; INFILTRATION; INTRACAUDAL; PERINEURAL at 12:51

## 2022-01-01 RX ADMIN — FUROSEMIDE 20 MG: 10 INJECTION, SOLUTION INTRAMUSCULAR; INTRAVENOUS at 16:00

## 2022-01-01 RX ADMIN — PRIMIDONE 50 MG: 50 TABLET ORAL at 14:10

## 2022-01-01 RX ADMIN — METHOCARBAMOL TABLETS 750 MG: 750 TABLET, COATED ORAL at 00:41

## 2022-01-01 RX ADMIN — DEXMEDETOMIDINE HYDROCHLORIDE 0.2 MCG/KG/HR: 4 INJECTION, SOLUTION INTRAVENOUS at 10:31

## 2022-01-01 RX ADMIN — SODIUM CHLORIDE, PRESERVATIVE FREE 10 ML: 5 INJECTION INTRAVENOUS at 09:43

## 2022-01-01 RX ADMIN — PRIMIDONE 50 MG: 50 TABLET ORAL at 09:47

## 2022-01-01 RX ADMIN — PREGABALIN 100 MG: 100 CAPSULE ORAL at 05:31

## 2022-01-01 RX ADMIN — METOCLOPRAMIDE 5 MG: 5 INJECTION, SOLUTION INTRAMUSCULAR; INTRAVENOUS at 05:18

## 2022-01-01 RX ADMIN — ROCURONIUM BROMIDE 20 MG: 10 INJECTION INTRAVENOUS at 16:20

## 2022-01-01 ASSESSMENT — PAIN SCALES - GENERAL
PAINLEVEL_OUTOF10: 8
PAINLEVEL_OUTOF10: 7
PAINLEVEL_OUTOF10: 7
PAINLEVEL_OUTOF10: 0
PAINLEVEL_OUTOF10: 10
PAINLEVEL_OUTOF10: 10
PAINLEVEL_OUTOF10: 7
PAINLEVEL_OUTOF10: 8
PAINLEVEL_OUTOF10: 10
PAINLEVEL_OUTOF10: 6
PAINLEVEL_OUTOF10: 8
PAINLEVEL_OUTOF10: 10
PAINLEVEL_OUTOF10: 4
PAINLEVEL_OUTOF10: 9
PAINLEVEL_OUTOF10: 9
PAINLEVEL_OUTOF10: 10
PAINLEVEL_OUTOF10: 7
PAINLEVEL_OUTOF10: 8
PAINLEVEL_OUTOF10: 8
PAINLEVEL_OUTOF10: 6
PAINLEVEL_OUTOF10: 9
PAINLEVEL_OUTOF10: 10
PAINLEVEL_OUTOF10: 9
PAINLEVEL_OUTOF10: 9
PAINLEVEL_OUTOF10: 8
PAINLEVEL_OUTOF10: 10
PAINLEVEL_OUTOF10: 10
PAINLEVEL_OUTOF10: 8
PAINLEVEL_OUTOF10: 7
PAINLEVEL_OUTOF10: 7
PAINLEVEL_OUTOF10: 10
PAINLEVEL_OUTOF10: 4
PAINLEVEL_OUTOF10: 10
PAINLEVEL_OUTOF10: 8
PAINLEVEL_OUTOF10: 10
PAINLEVEL_OUTOF10: 10
PAINLEVEL_OUTOF10: 3

## 2022-01-01 ASSESSMENT — PULMONARY FUNCTION TESTS
PIF_VALUE: 23
PIF_VALUE: 24
PIF_VALUE: 22
PIF_VALUE: 22
PIF_VALUE: 24
PIF_VALUE: 23
PIF_VALUE: 22
PIF_VALUE: 24
PIF_VALUE: 22
PIF_VALUE: 22
PIF_VALUE: 24
PIF_VALUE: 24
PIF_VALUE: 22
PIF_VALUE: 23
PIF_VALUE: 21
PIF_VALUE: 23
PIF_VALUE: 23
PIF_VALUE: 25
PIF_VALUE: 24
PIF_VALUE: 20
PIF_VALUE: 22
PIF_VALUE: 24
PIF_VALUE: 22
PIF_VALUE: 23
PIF_VALUE: 22
PIF_VALUE: 23
PIF_VALUE: 24
PIF_VALUE: 22
PIF_VALUE: 32
PIF_VALUE: 22
PIF_VALUE: 24
PIF_VALUE: 22
PIF_VALUE: 22
PIF_VALUE: 24
PIF_VALUE: 22
PIF_VALUE: 22
PIF_VALUE: 23
PIF_VALUE: 24
PIF_VALUE: 22
PIF_VALUE: 22
PIF_VALUE: 24
PIF_VALUE: 24
PIF_VALUE: 22
PIF_VALUE: 3
PIF_VALUE: 23
PIF_VALUE: 22
PIF_VALUE: 24
PIF_VALUE: 21
PIF_VALUE: 24
PIF_VALUE: 22
PIF_VALUE: 25
PIF_VALUE: 23
PIF_VALUE: 22
PIF_VALUE: 24
PIF_VALUE: 23
PIF_VALUE: 24
PIF_VALUE: 21
PIF_VALUE: 23
PIF_VALUE: 24
PIF_VALUE: 25
PIF_VALUE: 24
PIF_VALUE: 22
PIF_VALUE: 23
PIF_VALUE: 22
PIF_VALUE: 1
PIF_VALUE: 24
PIF_VALUE: 23
PIF_VALUE: 24
PIF_VALUE: 22
PIF_VALUE: 22
PIF_VALUE: 21
PIF_VALUE: 24
PIF_VALUE: 22
PIF_VALUE: 25
PIF_VALUE: 25
PIF_VALUE: 22
PIF_VALUE: 24
PIF_VALUE: 23
PIF_VALUE: 24
PIF_VALUE: 24
PIF_VALUE: 22
PIF_VALUE: 24
PIF_VALUE: 23
PIF_VALUE: 24
PIF_VALUE: 24
PIF_VALUE: 21
PIF_VALUE: 23
PIF_VALUE: 23
PIF_VALUE: 22
PIF_VALUE: 24
PIF_VALUE: 22
PIF_VALUE: 25
PIF_VALUE: 24
PIF_VALUE: 22
PIF_VALUE: 23
PIF_VALUE: 23
PIF_VALUE: 22
PIF_VALUE: 25
PIF_VALUE: 25
PIF_VALUE: 23
PIF_VALUE: 25
PIF_VALUE: 22
PIF_VALUE: 23
PIF_VALUE: 22
PIF_VALUE: 24
PIF_VALUE: 23
PIF_VALUE: 23
PIF_VALUE: 24
PIF_VALUE: 21
PIF_VALUE: 24
PIF_VALUE: 23
PIF_VALUE: 22
PIF_VALUE: 1
PIF_VALUE: 22
PIF_VALUE: 21
PIF_VALUE: 22
PIF_VALUE: 23
PIF_VALUE: 23
PIF_VALUE: 27
PIF_VALUE: 23
PIF_VALUE: 22
PIF_VALUE: 24
PIF_VALUE: 23
PIF_VALUE: 24
PIF_VALUE: 23
PIF_VALUE: 16
PIF_VALUE: 23
PIF_VALUE: 24
PIF_VALUE: 23
PIF_VALUE: 22
PIF_VALUE: 24
PIF_VALUE: 22
PIF_VALUE: 16
PIF_VALUE: 8
PIF_VALUE: 22
PIF_VALUE: 24
PIF_VALUE: 22
PIF_VALUE: 22
PIF_VALUE: 26
PIF_VALUE: 24
PIF_VALUE: 22
PIF_VALUE: 23
PIF_VALUE: 24
PIF_VALUE: 23
PIF_VALUE: 23
PIF_VALUE: 20
PIF_VALUE: 23
PIF_VALUE: 24
PIF_VALUE: 23
PIF_VALUE: 22
PIF_VALUE: 24
PIF_VALUE: 23
PIF_VALUE: 22
PIF_VALUE: 22
PIF_VALUE: 21
PIF_VALUE: 25
PIF_VALUE: 23
PIF_VALUE: 23
PIF_VALUE: 25
PIF_VALUE: 22
PIF_VALUE: 0
PIF_VALUE: 0
PIF_VALUE: 24
PIF_VALUE: 25
PIF_VALUE: 23
PIF_VALUE: 20
PIF_VALUE: 24
PIF_VALUE: 22
PIF_VALUE: 24
PIF_VALUE: 24
PIF_VALUE: 22
PIF_VALUE: 22
PIF_VALUE: 23
PIF_VALUE: 24
PIF_VALUE: 22
PIF_VALUE: 22
PIF_VALUE: 24
PIF_VALUE: 24
PIF_VALUE: 22
PIF_VALUE: 0
PIF_VALUE: 23
PIF_VALUE: 0
PIF_VALUE: 24
PIF_VALUE: 24
PIF_VALUE: 22
PIF_VALUE: 22
PIF_VALUE: 24
PIF_VALUE: 25
PIF_VALUE: 23
PIF_VALUE: 22
PIF_VALUE: 21
PIF_VALUE: 22
PIF_VALUE: 21
PIF_VALUE: 23
PIF_VALUE: 21
PIF_VALUE: 23
PIF_VALUE: 22
PIF_VALUE: 22
PIF_VALUE: 24
PIF_VALUE: 23
PIF_VALUE: 22
PIF_VALUE: 22
PIF_VALUE: 24
PIF_VALUE: 23
PIF_VALUE: 24
PIF_VALUE: 22
PIF_VALUE: 23
PIF_VALUE: 22
PIF_VALUE: 23
PIF_VALUE: 24
PIF_VALUE: 21
PIF_VALUE: 22
PIF_VALUE: 22
PIF_VALUE: 23
PIF_VALUE: 23
PIF_VALUE: 22
PIF_VALUE: 23
PIF_VALUE: 21
PIF_VALUE: 23
PIF_VALUE: 22
PIF_VALUE: 23
PIF_VALUE: 22
PIF_VALUE: 23
PIF_VALUE: 27
PIF_VALUE: 23
PIF_VALUE: 23
PIF_VALUE: 22
PIF_VALUE: 21
PIF_VALUE: 22
PIF_VALUE: 22
PIF_VALUE: 24
PIF_VALUE: 22
PIF_VALUE: 23
PIF_VALUE: 3
PIF_VALUE: 22
PIF_VALUE: 23
PIF_VALUE: 21
PIF_VALUE: 22
PIF_VALUE: 21
PIF_VALUE: 21
PIF_VALUE: 22
PIF_VALUE: 21
PIF_VALUE: 22
PIF_VALUE: 23
PIF_VALUE: 22
PIF_VALUE: 24
PIF_VALUE: 22
PIF_VALUE: 23
PIF_VALUE: 24
PIF_VALUE: 22
PIF_VALUE: 25
PIF_VALUE: 24
PIF_VALUE: 23
PIF_VALUE: 22
PIF_VALUE: 21
PIF_VALUE: 24
PIF_VALUE: 23
PIF_VALUE: 22
PIF_VALUE: 23
PIF_VALUE: 0
PIF_VALUE: 23
PIF_VALUE: 23
PIF_VALUE: 22
PIF_VALUE: 24
PIF_VALUE: 22
PIF_VALUE: 22
PIF_VALUE: 23
PIF_VALUE: 25
PIF_VALUE: 24
PIF_VALUE: 23
PIF_VALUE: 24
PIF_VALUE: 24
PIF_VALUE: 23
PIF_VALUE: 0
PIF_VALUE: 27
PIF_VALUE: 23
PIF_VALUE: 18
PIF_VALUE: 23
PIF_VALUE: 22
PIF_VALUE: 24
PIF_VALUE: 22
PIF_VALUE: 24
PIF_VALUE: 0
PIF_VALUE: 22
PIF_VALUE: 24
PIF_VALUE: 23
PIF_VALUE: 26
PIF_VALUE: 23
PIF_VALUE: 20
PIF_VALUE: 23
PIF_VALUE: 22
PIF_VALUE: 22
PIF_VALUE: 24
PIF_VALUE: 22
PIF_VALUE: 24
PIF_VALUE: 22
PIF_VALUE: 22
PIF_VALUE: 21
PIF_VALUE: 23
PIF_VALUE: 21
PIF_VALUE: 22
PIF_VALUE: 24
PIF_VALUE: 25
PIF_VALUE: 23
PIF_VALUE: 22
PIF_VALUE: 26
PIF_VALUE: 22
PIF_VALUE: 24
PIF_VALUE: 23
PIF_VALUE: 22
PIF_VALUE: 23
PIF_VALUE: 22
PIF_VALUE: 21
PIF_VALUE: 22
PIF_VALUE: 22
PIF_VALUE: 25
PIF_VALUE: 24
PIF_VALUE: 22
PIF_VALUE: 24
PIF_VALUE: 24
PIF_VALUE: 23
PIF_VALUE: 23
PIF_VALUE: 24
PIF_VALUE: 23
PIF_VALUE: 23
PIF_VALUE: 24
PIF_VALUE: 22
PIF_VALUE: 23
PIF_VALUE: 22

## 2022-01-01 ASSESSMENT — LIFESTYLE VARIABLES: SMOKING_STATUS: 0

## 2022-01-01 ASSESSMENT — PAIN DESCRIPTION - DESCRIPTORS
DESCRIPTORS: ACHING
DESCRIPTORS: SHARP;PENETRATING
DESCRIPTORS: ACHING
DESCRIPTORS: ACHING;SHARP
DESCRIPTORS: SHARP
DESCRIPTORS: ACHING;SHARP

## 2022-01-01 ASSESSMENT — PAIN DESCRIPTION - LOCATION
LOCATION: TIBIA
LOCATION: LEG
LOCATION: TIBIA
LOCATION: TIBIA
LOCATION: GENERALIZED
LOCATION: TIBIA
LOCATION: LEG
LOCATION: GENERALIZED
LOCATION: GENERALIZED
LOCATION: LEG
LOCATION: LEG
LOCATION: GENERALIZED

## 2022-01-01 ASSESSMENT — PAIN DESCRIPTION - FREQUENCY
FREQUENCY: CONTINUOUS

## 2022-01-01 ASSESSMENT — PAIN DESCRIPTION - PAIN TYPE
TYPE: ACUTE PAIN

## 2022-01-01 ASSESSMENT — PAIN - FUNCTIONAL ASSESSMENT: PAIN_FUNCTIONAL_ASSESSMENT: 0-10

## 2022-01-01 ASSESSMENT — PAIN DESCRIPTION - ONSET
ONSET: ON-GOING
ONSET: SUDDEN

## 2022-01-01 ASSESSMENT — PAIN DESCRIPTION - ORIENTATION
ORIENTATION: RIGHT
ORIENTATION: RIGHT;LEFT
ORIENTATION: RIGHT
ORIENTATION: RIGHT
ORIENTATION: RIGHT;LEFT
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
## 2022-01-30 NOTE — ED NOTES
Approximately 130mL of CT IV Contrast infiltrated into forearm. IV cath removed by CT, cold compress applied to RUE.         Sudeep Smith RN  01/30/22 9984

## 2022-01-30 NOTE — ED NOTES
Pt arrival to Trauma A via [employer] flight from Infectious. Per EMS, at approximately 21  pt was attempting to get out of bed and sustained bilateral distal tib/fib fx. Per EMS, first responders had a prolonged attempt to remove pt from house. GCS of 14 for EMS, delayed cap re-fill about 4 seconds for EMS. Pt is very [place] and hearing aides were not brought with pt.       Brittanie Holland RN  01/30/22 1257

## 2022-01-30 NOTE — ED NOTES
Bed: 04  Expected date: 1/30/22  Expected time: 1:17 PM  Means of arrival:   Comments:  [id number] Prema Nettles, RN  01/30/22 2719

## 2022-01-30 NOTE — H&P
TRAUMA HISTORY AND PHYSICAL EXAMINATION    PATIENT NAME: Mauricio Trauma Rober  YOB: 1933  MEDICAL RECORD NO. [id number]   DATE: 1/30/2022  PRIMARY CARE PHYSICIAN: Doroteo Cunha DO  PATIENT EVALUATED AT THE REQUEST OF : Joan    ACTIVATION   []Trauma Alert     [x] Trauma Priority     []Trauma Consult. IMPRESSION:     Patient Active Problem List   Diagnosis    Fall at home, initial encounter        MEDICAL DECISION MAKING AND PLAN:       Admit to: Stepdown    Pain control: Fentanyl 50mcg q2h prn  CV:  HR 80  /68  Heme: H/H 12.2/36.8 pre-operatively  Pulm: 4L NC  Renal/lytes:   BUN/Cr 35/1.49  Na/K 135/4.5  F/u UA  GI:  NPO for now  Endo:   Hx DM; will add HDSS as needed  ID: Ancef 3g; afebrile; WBC 12.6  MSK:  F/u remaining imaging  Bilateral tib/fib fx  TERT to follow    Inj:  Bilateral open tib/fib fx    CONSULT SERVICES    [] Neurosurgery     [x] Orthopedic Surgery    [] Cardiothoracic     [] Facial Trauma    [] Plastic Surgery (Burn)    [] Pediatric Surgery     [] Internal Medicine    [] Pulmonary Medicine    [] Other:      HISTORY:     Chief Complaint:  \"My legs hurt\"    INJURY SUMMARY  Bilateral open tib/fib fracture    If intracranial hemorrhage is present, is it a BIG 1 category: [] YES  []NO    GENERAL DATA  Age 79 yo  female   Patient information was obtained from patient and EMS personnel. History/Exam limitations: communication barrier incomprehensible speech.   Patient presented to the Emergency Department by ambulance where the patient received see Ambulance Run Sheet prior to arrival.  Injury Date: 1/30/2022   Approximate Injury Time: 10:30       Transport mode:   []Ambulance      [x] Helicopter     []Car       [] Other      INJURY LOCATION, (e.g., home, farm, industry, street)  Specific Details of Location (e.g., bedroom, kitchen, garage): home  Type of Residence (if occurred in home setting) (e.g., apartment, mobile home, single family home): bedroom    MECHANISM OF INJURY           [x] Fall    [x]From Standing     []From Height  Ft     []Down Stairs ___steps        HISTORY:     Mauricio Rascon is a 79 yo female that presented to the Emergency Department following fall from [facility]. Patient was getting out of bed when her daughter heard a thud and screaming, found the patient on the ground with open bilateral tib/fib fractures. No AC, on ASA. No LOC. Patient has stuttering speech and is difficult to understand. Says she was not pushed or abused. Has tremors, halting speech at baseline. Loss of Consciousness [x]No   []Yes Duration(min)       [] Unknown     Total Fluids Given Prior To Arrival  mL    MEDICATIONS:   []  None     [x]  Information not available due to exam limitations documented above    Prior to Admission medications    Not on File   Amlodipine  Allopurinol  Lyrica  Propranolol  Trileptal  Bumex  Spironlactone     ALLERGIES:   []  None    []   Information not available due to exam limitations documented above   Lisinopril  Adhesive tape and Lisinopril    PAST MEDICAL HISTORY: []  None   [x]   Information not available due to exam limitations documented above   CKD  A fib  DM  Neuropathy   has no past medical history on file. has no past surgical history on file. FAMILY HISTORY   [x]   Information not available due to exam limitations documented above     family history is not on file. SOCIAL HISTORY  [x]   Information not available due to exam limitations documented above     has no history on file for tobacco use.   has no history on file for alcohol use.   has no history on file for drug use. Review of Systems:    []   Information not available due to exam limitations documented above    Review of Systems   Musculoskeletal: Positive for arthralgias. Skin: Positive for wound. All other systems reviewed and are negative.            PHYSICAL EXAMINATION:     GLASCOW COMA SCALE  NEUROMUSCULAR BLOCKADE PRIOR TO ARRIVAL     [x]No []Yes      Variable  Score   Variable  Score  Eye opening [x]Spontaneous 4 Verbal  []Oriented  5     []To voice  3   [x]Confused  4    []To pain  2   []Inapp words  3    []None  1   []Incomp words 2       []None  1   Motor   [x]Obeys  6    []Localizes pain 5    []Withdraws(pain) 4    []Flexion(pain) 3  []Extension(pain) 2    []None  1     GCS Total = 14    PHYSICAL EXAMINATION    VITAL SIGNS:   Vitals:    01/30/22 1344   BP:    Pulse: 97   Resp: 12   Temp:    SpO2: 97%   BP (!) 148/87   Pulse 97   Temp 97.5 °F (36.4 °C)   Resp 12   Ht 5' 9\" (1.753 m)   Wt 260 lb (117.9 kg)   SpO2 97%   BMI 38.40 kg/m²       Physical Exam  Vitals and nursing note reviewed. Constitutional:       Appearance: Normal appearance. She is not toxic-appearing. HENT:      Head: Normocephalic and atraumatic. Right Ear: Tympanic membrane and ear canal normal.      Left Ear: Tympanic membrane and ear canal normal.      Nose: Nose normal.      Mouth/Throat:      Mouth: Mucous membranes are moist.      Pharynx: Oropharynx is clear. Eyes:      Extraocular Movements: Extraocular movements intact. Conjunctiva/sclera: Conjunctivae normal.      Pupils: Pupils are equal, round, and reactive to light. Cardiovascular:      Rate and Rhythm: Normal rate and regular rhythm. Pulses: Normal pulses. Comments: 2+ radial and DP   Pulmonary:      Effort: Pulmonary effort is normal.      Breath sounds: Normal breath sounds. Abdominal:      General: There is no distension. Palpations: Abdomen is soft. Tenderness: There is generalized abdominal tenderness. There is no guarding. Musculoskeletal:      Cervical back: Normal and normal range of motion. Thoracic back: Normal.      Lumbar back: Tenderness present. Right hip: Normal.      Left hip: Normal.      Right lower leg: Deformity and laceration present. Left lower leg: Deformity and laceration present. Right foot: Normal capillary refill.  No Final Result   No acute intracranial abnormalities are noted. Multilevel cervical spondylosis and degenerative disc disease. Mild to moderate spinal stenosis at C5-6 and C6-7      Probable chronic fracture of C6 spinous process. Please correlate clinically      RECOMMENDATIONS:   Further evaluation of the cervical spine should be obtained with MR imaging   if clinically indicated. XR ANKLE LEFT (2 VIEWS)   Final Result   Single lateral view of left ankle: Imaging from proximal tibial meta   diaphysis through the ankle. Transversely oriented fracture distal tibia and   fibular metadiaphysis at roughly the same level about 7 cm proximal to   tibiotalar joint. Anterior displacement of distal fibular fragment by almost   after shaft width. Lesser degree of displacement of distal tibial fragment. Ankle joint appears intact on this single lateral view. Soft tissue swelling   lower leg/ankle. Single lateral view right ankle: Imaging from proximal tibial metadiaphysis   through the ankle. There is distal tibial fracture close to the metaphysis   about 7 cm above the tibiotalar joint. Distal fragment displaced anteriorly   by almost half a full shaft width. There is comminuted fibular fracture at a   slightly cephalad level with mild displacement. Plate and screws in the   fibula. 2 tibial screws. RECOMMENDATION:   1. Distal left tibial and fibular metadiaphyseal fractures at about the same   level. There is anterior displacement of distal fragments as discussed above. 2. Displaced distal tibia and fibula fractures. Tibial fracture at a lower   level than fibula fracture. Distal tibial fragment displaced almost full   shaft with anteriorly. Fibula fracture comminuted. Plate and screws in the   distal tibia fibular as discussed above.          XR ANKLE RIGHT (2 VIEWS)   Final Result   Single lateral view of left ankle: Imaging from proximal tibial meta   diaphysis through the ankle.  Transversely oriented fracture distal tibia and   fibular metadiaphysis at roughly the same level about 7 cm proximal to   tibiotalar joint. Anterior displacement of distal fibular fragment by almost   after shaft width. Lesser degree of displacement of distal tibial fragment. Ankle joint appears intact on this single lateral view. Soft tissue swelling   lower leg/ankle. Single lateral view right ankle: Imaging from proximal tibial metadiaphysis   through the ankle. There is distal tibial fracture close to the metaphysis   about 7 cm above the tibiotalar joint. Distal fragment displaced anteriorly   by almost half a full shaft width. There is comminuted fibular fracture at a   slightly cephalad level with mild displacement. Plate and screws in the   fibula. 2 tibial screws. RECOMMENDATION:   1. Distal left tibial and fibular metadiaphyseal fractures at about the same   level. There is anterior displacement of distal fragments as discussed above. 2. Displaced distal tibia and fibula fractures. Tibial fracture at a lower   level than fibula fracture. Distal tibial fragment displaced almost full   shaft with anteriorly. Fibula fracture comminuted. Plate and screws in the   distal tibia fibular as discussed above.          XR ANKLE LEFT (MIN 3 VIEWS)    (Results Pending)   XR ANKLE RIGHT (MIN 3 VIEWS)    (Results Pending)   CT TIBIA FIBULA RIGHT WO CONTRAST    (Results Pending)   CT TIBIA FIBULA LEFT WO CONTRAST    (Results Pending)   XR KNEE RIGHT (3 VIEWS)    (Results Pending)   XR TIBIA FIBULA RIGHT (2 VIEWS)    (Results Pending)   XR TIBIA FIBULA LEFT (2 VIEWS)    (Results Pending)   XR FOOT LEFT (MIN 3 VIEWS)    (Results Pending)   XR FOOT RIGHT (MIN 3 VIEWS)    (Results Pending)         LABS    Labs Reviewed   TRAUMA PANEL - Abnormal; Notable for the following components:       Result Value    BUN 35 (*)     WBC 12.6 (*)     RBC 3.91 (*)     RDW 15.6 (*)     CREATININE 1.45 (*) Glucose 114 (*)     PTT 16.9 (*)     pH, Luis Felipe 7.428 (*)     pO2, Luis Felipe 72.7 (*)     Positive Base Excess, Luis Felipe 5.3 (*)     O2 Sat, Luis Felipe 93.4 (*)     All other components within normal limits   COVID-19, RAPID   URINE DRUG SCREEN   URINALYSIS   TYPE AND SCREEN         Geovanna Appiah MD  1/30/22, 2:12 PM

## 2022-01-30 NOTE — ED PROVIDER NOTES
[facility]     Emergency Department     Faculty Attestation    I performed a history and physical examination of the patient and discussed management with the resident. I have reviewed and agree with the residents findings including all diagnostic interpretations, and treatment plans as written at the time of my review. Any areas of disagreement are noted on the chart. I was personally present for the key portions of any procedures. I have documented in the chart those procedures where I was not present during the key portions. For Physician Assistant/ Nurse Practitioner cases/documentation I have personally evaluated this patient and have completed at least one if not all key elements of the E/M (history, physical exam, and MDM). Additional findings are as noted. This patient was evaluated in the Emergency Department for symptoms described in the history of present illness. The patient was evaluated in the context of the global COVID-19 pandemic, which necessitated consideration that the patient might be at risk for infection with the SARS-CoV-2 virus that causes COVID-19. Institutional protocols and algorithms that pertain to the evaluation of patients at risk for COVID-19 are in a state of rapid change based on information released by regulatory bodies including the CDC and federal and state organizations. These policies and algorithms were followed during the patient's care in the ED. Primary Care Physician: No primary care provider on file. History: This is a 80 y.o. female who presents to the Emergency Department with complaint of leg pain after a fall. Patient brought in by [address]. Patient was met in trauma room a by the trauma team.    Physical:   weight is 260 lb (117.9 kg). Her temperature is 97.5 °F (36.4 °C). Her blood pressure is 125/68 and her pulse is 90. Her respiration is 20 and oxygen saturation is 98%.   Obvious open wounds to the bilateral lower extremity. Dopplerable pulses pedal bilaterally. Impression: Open tib-fib comminuted fractures, fall    Plan: CT x-ray labs and disposition per trauma with orthopedic consultation      CRITICAL CARE: There was a high probability of clinically significant/life threatening deterioration in this patient's condition which required my urgent intervention. Total critical care time was 20 minutes. This excludes any time for separately reportable procedures. (Please note that portions of this note were completed with a voice recognition program.  Efforts were made to edit the dictations but occasionally words are mis-transcribed.)    Brook Casiano.  Belen Gomez MD, [address] [facility]  Attending Emergency Medicine Physician        Mikaela Rivas MD  01/30/22 6250

## 2022-01-30 NOTE — PROGRESS NOTES
[facility]     Emergency/Trauma Note    PATIENT NAME: Loretta Cabrera    Shift date: 01-  Shift day: Sunday   Shift # 1    Room # 0235/8629-23   Name: Loretta Cabrera            Age: 80 y.o. Gender: female          [employer]: No [employer] on file    Place of [facility]: unknown    Trauma/Incident type: Adult Trauma Priority  Admit Date & Time: 1/30/2022 11:29 AM  TRAUMA NAME: Stefan Coffeyyas    ADVANCE DIRECTIVES IN CHART? No    NAME OF DECISION MAKER: Daughter--Sherry Dyane Duane [phone]    RELATIONSHIP OF DECISION MAKER TO PATIENT: Daughter    PATIENT/EVENT DESCRIPTION:  Loretta Cabrera is a 80 y.o. female who arrived via [address] from [facility] as a [address]. Pt reportedly fell at home and experienced bilateral tib/fib open fxs. Pt most likely will be taken to OR. Pt's daughter Efraín Sewell is out of town, but reachable via phone. Pt has HCPOA and family will bring document in. Pt to be admitted to 0422/0422-02. SPIRITUAL ASSESSMENT/INTERVENTION:  Writer phoned daughter with number found in system by registration. Daughter received call and already know of situation. Later--referral made to Fr. Anca Friedman to assist with family. PATIENT BELONGINGS:  No belongings noted    ANY BELONGINGS OF SIGNIFICANT VALUE NOTED:  Not noted. REGISTRATION STAFF NOTIFIED? Yes      WHAT IS YOUR SPIRITUAL CARE PLAN FOR THIS PATIENT?:   Follow and be available as care plan become more clear.     Electronically signed by Ron Goodpasture, on 1/30/2022 at 4:51 PM.  [place]  [phone]

## 2022-01-30 NOTE — ED PROVIDER NOTES
[facility] 4B STEPDOWN  Emergency Department Encounter  EmergencyMedicine Resident     Pt Donald Gay  MRN: [id number]  Krisgfhernandez 12/18/1933  Date of evaluation: 1/30/22  PCP:  María Madera, [address] [place] [address]       Chief Complaint   Patient presents with   Verdene Hurry    Leg Pain     bilateral       HISTORY OF PRESENT ILLNESS  (Location/Symptom, Timing/Onset, Context/Setting, Quality, Duration, Modifying Factors, Severity.)      Wisam Jin is a 80 y.o. female who presents with traumatic fall and bilateral lower extremity injuries. Patient stuttering and history on difficult to obtain. GCS 15. Patient most states she is on amitriptyline lisinopril, states that she has a history of neuropathy. Patient Doppler pulses. Patient had no C-spine tenderness on palpation. Patient presented as [facility] transport from scene with prolonged extrication from the house. PAST MEDICAL / SURGICAL / SOCIAL / FAMILY HISTORY      has no past medical history on file. No additional pertinent     has no past surgical history on file. No additional pertinent    Social History     Socioeconomic History    Marital status: Unknown     Spouse name: Not on file    Number of children: Not on file    Years of education: Not on file    Highest education level: Not on file   Occupational History    Not on file   Tobacco Use    Smoking status: Not on file    Smokeless tobacco: Not on file   Substance and Sexual Activity    Alcohol use: Not on file    Drug use: Not on file    Sexual activity: Not on file   Other Topics Concern    Not on file   Social History Narrative    Not on file     Social Determinants of Health     Financial Resource Strain:     Difficulty of Paying Living Expenses: Not on file   Food Insecurity:     Worried About Running Out of Food in the Last Year: Not on file    Aren of Food in the Last Year: Not on file   Transportation Needs:     Lack of Transportation (Medical):  Not on file    Lack of Transportation (Non-Medical): Not on file   Physical Activity:     Days of Exercise per Week: Not on file    Minutes of Exercise per Session: Not on file   Stress:     Feeling of Stress : Not on file   Social Connections:     Frequency of Communication with Friends and Family: Not on file    Frequency of Social Gatherings with Friends and Family: Not on file    Attends Je[employer]'s witness Services: Not on file    Active Member of [address] [place] [address] or Organizations: Not on file    Attends Club or Organization Meetings: Not on file    Marital Status: Not on file   Intimate Partner Violence:     Fear of Current or Ex-Partner: Not on file    Emotionally Abused: Not on file    Physically Abused: Not on file    Sexually Abused: Not on file   Housing Stability:     Unable to Pay for Housing in the Last Year: Not on file    Number of [place] in the Last Year: Not on file    Unstable Housing in the Last Year: Not on file       No family history on file. Allergies:  Adhesive tape and Lisinopril    Home Medications:  Prior to Admission medications    Not on File       REVIEW OF SYSTEMS    (2-9 systems for level 4, 10 or more for level 5)      Review of Systems   Unable to perform ROS: Acuity of condition       PHYSICAL EXAM   (up to 7 for level 4, 8 or more for level 5)      INITIAL VITALS:   BP 96/69   Pulse 109   Temp 97.5 °F (36.4 °C) (Oral)   Resp 17   Ht 5' 9\" (1.753 m)   Wt 260 lb (117.9 kg)   SpO2 92%   BMI 38.40 kg/m²     Physical Exam  Constitutional:       Appearance: Normal appearance. HENT:      Head: Normocephalic and atraumatic. Mouth/Throat:      Mouth: Mucous membranes are moist.      Pharynx: Oropharynx is clear. Eyes:      Extraocular Movements: Extraocular movements intact. Conjunctiva/sclera: Conjunctivae normal.   Cardiovascular:      Rate and Rhythm: Normal rate and regular rhythm. Comments: Doppler pulses in lower extremity.     Pulmonary:      Effort: Pulmonary effort is normal.      Breath sounds: Normal breath sounds. Abdominal:      General: There is no distension. Palpations: Abdomen is soft. Tenderness: There is no abdominal tenderness. There is no guarding. Musculoskeletal:      Cervical back: No tenderness (Negative C-spine tenderness to palpation). Comments: Bilateral open tibia fractures in the lower extremities. Skin:     General: Skin is warm and dry. Findings: No rash (On exposed skin). Neurological:      General: No focal deficit present. Mental Status: She is alert and oriented to person, place, and time.    Psychiatric:         Mood and Affect: Mood normal.         Behavior: Behavior normal.         DIFFERENTIAL  DIAGNOSIS     PLAN (LABS / IMAGING / EKG):  Orders Placed This Encounter   Procedures    COVID-19, Rapid    CT CERVICAL SPINE WO CONTRAST    CT LUMBAR SPINE TRAUMA RECONSTRUCTION    CT HEAD WO CONTRAST    CT THORACIC SPINE TRAUMA RECONSTRUCTION    XR ANKLE LEFT (2 VIEWS)    XR ANKLE RIGHT (2 VIEWS)    XR ANKLE LEFT (MIN 3 VIEWS)    XR ANKLE RIGHT (MIN 3 VIEWS)    CT CHEST ABDOMEN PELVIS WO CONTRAST    CT TIBIA FIBULA RIGHT WO CONTRAST    CT TIBIA FIBULA LEFT WO CONTRAST    XR KNEE RIGHT (3 VIEWS)    XR TIBIA FIBULA RIGHT (2 VIEWS)    XR TIBIA FIBULA LEFT (2 VIEWS)    XR FOOT LEFT (MIN 3 VIEWS)    XR FOOT RIGHT (MIN 3 VIEWS)    XR KNEE RIGHT (3 VIEWS)    Trauma Panel    Urine Drug Screen    Urinalysis    CBC Auto Differential    BASIC METABOLIC PANEL    Vitamin D 25 Hydroxy    Diet NPO Exceptions are: Sips of Water with Meds    Diet NPO Exceptions are: Sips of Water with Meds    Vital signs per unit routine    Notify patient's primary care physician of admission    Place intermittent pneumatic compression device    Telemetry monitoring - continuous duration    Notify physician    Strict Bedrest    Daily weights    Intake and output    Elevate legs    Elevate Head of Bed     Wound care    Monitor for signs/symptoms of urinary retention    Turn or assist with turn approximately every 2 hours if patient is unable to turn self. Remind patient to turn if necessary.  Assess skin per unit guidelines    Pad/offload medical devices    Maintain HOB at the lowest elevation consistent with medical plan of care    Use lift equipment for lifting patient    Maintain heels off of bed at all times    Full Code    Inpatient consult to Orthopedic Surgery    Inpatient consult to Cardiology    OT eval and treat    PT evaluation and treat    Initiate Oxygen Therapy Protocol    Speech language pathology evaluation    EKG 12 Lead    Type and Screen    PATIENT STATUS (FROM ED OR OR/PROCEDURAL) Inpatient       MEDICATIONS ORDERED:  Orders Placed This Encounter   Medications    iopamidol (ISOVUE-370) 76 % injection 130 mL    fentaNYL (SUBLIMAZE) 100 MCG/2ML injection     MASHA WOMACK: cabinet override    fentaNYL (SUBLIMAZE) injection    ceFAZolin (ANCEF) 1-4 GM/50ML-% IVPB (premix)     MASHA WOMACK: cabinet override    Tetanus-Diphth-Acell Pertussis ([address]) 5-2.5-18.5 LF-MCG/0.5 injection     Fran Tam: cabinet override    ceFAZolin (ANCEF) 1000 mg in dextrose 5 % 50 mL IVPB (premix)    tetanus-diphth-acell pertussis (BOOSTRIX) injection 0.5 mL    fentaNYL (SUBLIMAZE) 100 MCG/2ML injection     Morenita Sheehan: cabinet override    sodium chloride flush 0.9 % injection 5-40 mL    sodium chloride flush 0.9 % injection 5-40 mL    0.9 % sodium chloride infusion    polyethylene glycol (GLYCOLAX) packet 17 g    lactated ringers infusion    ceFAZolin (ANCEF) 2000 mg in dextrose 5 % 50 mL IVPB     Order Specific Question:   Antimicrobial Indications     Answer:    Other     Order Specific Question:   Other Abx Indication     Answer:   Trauma Prophylaxis    acetaminophen (TYLENOL) tablet 1,000 mg    methocarbamol (ROBAXIN) tablet 750 mg    gabapentin (NEURONTIN) tablet 300 mg    oxyCODONE (ROXICODONE) immediate release tablet 5 mg    ondansetron (ZOFRAN) injection 4 mg    fentaNYL (SUBLIMAZE) injection    ceFAZolin (ANCEF) 2000 mg in dextrose 5 % 50 mL IVPB     Order Specific Question:   Antimicrobial Indications     Answer:   Surgical Prophylaxis         DIAGNOSTIC RESULTS / EMERGENCY DEPARTMENT COURSE / MDM   LAB RESULTS:  Results for orders placed or performed during the hospital encounter of 01/30/22   COVID-19, Rapid    Specimen: Nasopharyngeal Swab   Result Value Ref Range    Specimen Description . NASOPHARYNGEAL SWAB     SARS-CoV-2, Rapid Not Detected Not Detected   Trauma Panel   Result Value Ref Range    Ethanol <10 <10 mg/dL    Ethanol percent <0.010 <0.010 %    Blood Bank Specimen BILL FOR SERVICES PERFORMED     BUN 35 (H) 8 - 23 mg/dL    WBC 12.6 (H) 3.5 - 11.3 k/uL    RBC 3.91 (L) 3.95 - 5.11 m/uL    Hemoglobin 12.2 11.9 - 15.1 g/dL    Hematocrit 36.8 36.3 - 47.1 %    MCV 94.1 82.6 - 102.9 fL    MCH 31.2 25.2 - 33.5 pg    MCHC 33.2 28.4 - 34.8 g/dL    RDW 15.6 (H) 11.8 - 14.4 %    Platelets 415 125 - 665 k/uL    MPV 10.8 8.1 - 13.5 fL    NRBC Automated 0.0 0.0 per 100 WBC    CREATININE 1.45 (H) 0.50 - 0.90 mg/dL    GFR Non- NOT REPORTED >60 mL/min    GFR  NOT REPORTED >60 mL/min    GFR Comment NOT REPORTED     GFR Staging NOT REPORTED     Glucose 114 (H) 70 - 99 mg/dL    hCG Qual NEGATIVE NEGATIVE    Sodium 135 135 - 144 mmol/L    Potassium 4.5 3.7 - 5.3 mmol/L    Chloride 98 98 - 107 mmol/L    CO2 28 20 - 31 mmol/L    Anion Gap 9 9 - 17 mmol/L    Protime 10.6 9.1 - 12.3 sec    INR 1.0     PTT 16.9 (L) 20.5 - 30.5 sec    pH, Luis Felipe 7.428 (H) 7.320 - 7.420    pCO2, Luis Felipe 46.2 39 - 55    pO2, Luis Felipe 72.7 (H) 30 - 50    HCO3, Venous 30.0 24 - 30 mmol/L    Positive Base Excess, Luis Felipe 5.3 (H) 0.0 - 2.0 mmol/L    Negative Base Excess, Luis Felipe NOT REPORTED 0.0 - 2.0 mmol/L    O2 Sat, Luis Felipe 93.4 (H) 60.0 - 85.0 %    Total Hb NOT REPORTED 12.0 - 16.0 g/dl Oxyhemoglobin NOT REPORTED 95.0 - 98.0 %    Carboxyhemoglobin 2.7 0 - 5 %    Methemoglobin NOT REPORTED 0.0 - 1.5 %    Pt Temp 37.0     pH, Luis Felipe, Temp Adj NOT REPORTED 7.320 - 7.420    pCO2, Luis Felipe, Temp Adj NOT REPORTED 39 - 55 mmHg    pO2, Luis Felipe, Temp Adj NOT REPORTED 30 - 50 mmHg    O2 Device/Flow/% NOT REPORTED     Respiratory Rate NOT REPORTED     Silvano Test NOT REPORTED     Sample Site NOT REPORTED     Pt. Position NOT REPORTED     Mode NOT REPORTED     Set Rate NOT REPORTED     Total Rate NOT REPORTED     VT NOT REPORTED     FIO2 INFORMATION NOT PROVIDED     Peep/Cpap NOT REPORTED     PSV NOT REPORTED     Text for Respiratory NOT REPORTED     NOTIFICATION NOT REPORTED     NOTIFICATION TIME NOT REPORTED    TYPE AND SCREEN   Result Value Ref Range    Expiration Date 02/02/2022,2359     Arm Band Number BE [id number]     ABO/Rh A POSITIVE     Antibody Screen NEGATIVE        RADIOLOGY:  XR ANKLE RIGHT (MIN 3 VIEWS)   Preliminary Result   1. Acute displaced fracture of the left distal tibial diaphysis with improved   alignment. 2. Comminuted and displaced fracture of the left distal fibular diaphysis,   alignment grossly unchanged. 3. Nondisplaced fracture of the tip of the left lateral malleolus, better   evaluated on prior CT. 4. Cast material is in place on the left. 5. Acute displaced fracture of the right distal tibial diaphysis, alignment   grossly unchanged. 6. Acute segmental fracture of the mid/distal right fibula, alignment grossly   unchanged. 7. Right knee arthroplasty with intact hardware. 8. Cast material in place on the right. XR KNEE RIGHT (3 VIEWS)   Final Result   Total knee arthropasty without acute hardware complication. XR TIBIA FIBULA RIGHT (2 VIEWS)   Preliminary Result   1. Acute displaced fracture of the left distal tibial diaphysis with improved   alignment. 2. Comminuted and displaced fracture of the left distal fibular diaphysis,   alignment grossly unchanged.    3. Nondisplaced fracture of the tip of the left lateral malleolus, better   evaluated on prior CT. 4. Cast material is in place on the left. 5. Acute displaced fracture of the right distal tibial diaphysis, alignment   grossly unchanged. 6. Acute segmental fracture of the mid/distal right fibula, alignment grossly   unchanged. 7. Right knee arthroplasty with intact hardware. 8. Cast material in place on the right. XR FOOT RIGHT (MIN 3 VIEWS)   Preliminary Result   1. Acute displaced fracture of the left distal tibial diaphysis with improved   alignment. 2. Comminuted and displaced fracture of the left distal fibular diaphysis,   alignment grossly unchanged. 3. Nondisplaced fracture of the tip of the left lateral malleolus, better   evaluated on prior CT. 4. Cast material is in place on the left. 5. Acute displaced fracture of the right distal tibial diaphysis, alignment   grossly unchanged. 6. Acute segmental fracture of the mid/distal right fibula, alignment grossly   unchanged. 7. Right knee arthroplasty with intact hardware. 8. Cast material in place on the right. XR ANKLE LEFT (MIN 3 VIEWS)   Final Result   Interval placement of back slab cast.  Fractures again identified of the   tibia and fibula as discussed above. There is now posterior displacement of distal tibial fragment by almost half   a shaft with whereas previously it was anteriorly displaced. XR TIBIA FIBULA LEFT (2 VIEWS)   Preliminary Result   1. Acute displaced fracture of the left distal tibial diaphysis with improved   alignment. 2. Comminuted and displaced fracture of the left distal fibular diaphysis,   alignment grossly unchanged. 3. Nondisplaced fracture of the tip of the left lateral malleolus, better   evaluated on prior CT. 4. Cast material is in place on the left. 5. Acute displaced fracture of the right distal tibial diaphysis, alignment   grossly unchanged.    6. Acute segmental fracture of the mid/distal right fibula, alignment grossly   unchanged. 7. Right knee arthroplasty with intact hardware. 8. Cast material in place on the right. XR FOOT LEFT (MIN 3 VIEWS)   Preliminary Result   1. Acute displaced fracture of the left distal tibial diaphysis with improved   alignment. 2. Comminuted and displaced fracture of the left distal fibular diaphysis,   alignment grossly unchanged. 3. Nondisplaced fracture of the tip of the left lateral malleolus, better   evaluated on prior CT. 4. Cast material is in place on the left. 5. Acute displaced fracture of the right distal tibial diaphysis, alignment   grossly unchanged. 6. Acute segmental fracture of the mid/distal right fibula, alignment grossly   unchanged. 7. Right knee arthroplasty with intact hardware. 8. Cast material in place on the right. CT TIBIA FIBULA RIGHT WO CONTRAST   Final Result   CT right tibia and fibula:      1. Acute and displaced fracture of the distal diaphysis of the right tibia. 2. Acute segmental fracture of the mid diaphysis of the right fibula. 3. Tissue edema, subcutaneous gas, and anterior laceration consistent with   open fractures. CT left tibia and fibula:      1. Acute and displaced fractures of the distal diaphysis of the left tibia   and fibula with surrounding soft tissue edema and gas consistent with open   fractures. 2. Nondisplaced fracture of the tip of the left lateral malleolus. 3. Osteopenia. CT TIBIA FIBULA LEFT WO CONTRAST   Final Result   CT right tibia and fibula:      1. Acute and displaced fracture of the distal diaphysis of the right tibia. 2. Acute segmental fracture of the mid diaphysis of the right fibula. 3. Tissue edema, subcutaneous gas, and anterior laceration consistent with   open fractures. CT left tibia and fibula:      1.  Acute and displaced fractures of the distal diaphysis of the left tibia   and fibula with surrounding soft tissue edema and gas consistent with open   fractures. 2. Nondisplaced fracture of the tip of the left lateral malleolus. 3. Osteopenia. CT LUMBAR SPINE TRAUMA RECONSTRUCTION   Final Result   CHEST ABDOMEN PELVIS      1. No acute visceral injury. 2. Cholelithiasis. 3. Large sliding hiatal hernia. THORACIC AND LUMBAR SPINE:      1. No acute fracture. 2. Moderate degenerative changes. 3. Multilevel lumbar laminectomies. CT THORACIC SPINE TRAUMA RECONSTRUCTION   Final Result   CHEST ABDOMEN PELVIS      1. No acute visceral injury. 2. Cholelithiasis. 3. Large sliding hiatal hernia. THORACIC AND LUMBAR SPINE:      1. No acute fracture. 2. Moderate degenerative changes. 3. Multilevel lumbar laminectomies. CT CHEST ABDOMEN PELVIS WO CONTRAST   Final Result   CHEST ABDOMEN PELVIS      1. No acute visceral injury. 2. Cholelithiasis. 3. Large sliding hiatal hernia. THORACIC AND LUMBAR SPINE:      1. No acute fracture. 2. Moderate degenerative changes. 3. Multilevel lumbar laminectomies. CT CERVICAL SPINE WO CONTRAST   Final Result   No acute intracranial abnormalities are noted. Multilevel cervical spondylosis and degenerative disc disease. Mild to moderate spinal stenosis at C5-6 and C6-7      Probable chronic fracture of C6 spinous process. Please correlate clinically      RECOMMENDATIONS:   Further evaluation of the cervical spine should be obtained with MR imaging   if clinically indicated. CT HEAD WO CONTRAST   Final Result   No acute intracranial abnormalities are noted. Multilevel cervical spondylosis and degenerative disc disease. Mild to moderate spinal stenosis at C5-6 and C6-7      Probable chronic fracture of C6 spinous process. Please correlate clinically      RECOMMENDATIONS:   Further evaluation of the cervical spine should be obtained with MR imaging   if clinically indicated.          XR ANKLE LEFT (2 VIEWS)   Final Result   Single lateral view of left ankle: Imaging from proximal tibial meta   diaphysis through the ankle. Transversely oriented fracture distal tibia and   fibular metadiaphysis at roughly the same level about 7 cm proximal to   tibiotalar joint. Anterior displacement of distal fibular fragment by almost   after shaft width. Lesser degree of displacement of distal tibial fragment. Ankle joint appears intact on this single lateral view. Soft tissue swelling   lower leg/ankle. Single lateral view right ankle: Imaging from proximal tibial metadiaphysis   through the ankle. There is distal tibial fracture close to the metaphysis   about 7 cm above the tibiotalar joint. Distal fragment displaced anteriorly   by almost half a full shaft width. There is comminuted fibular fracture at a   slightly cephalad level with mild displacement. Plate and screws in the   fibula. 2 tibial screws. RECOMMENDATION:   1. Distal left tibial and fibular metadiaphyseal fractures at about the same   level. There is anterior displacement of distal fragments as discussed above. 2. Displaced distal tibia and fibula fractures. Tibial fracture at a lower   level than fibula fracture. Distal tibial fragment displaced almost full   shaft with anteriorly. Fibula fracture comminuted. Plate and screws in the   distal tibia fibular as discussed above. XR ANKLE RIGHT (2 VIEWS)   Final Result   Single lateral view of left ankle: Imaging from proximal tibial meta   diaphysis through the ankle. Transversely oriented fracture distal tibia and   fibular metadiaphysis at roughly the same level about 7 cm proximal to   tibiotalar joint. Anterior displacement of distal fibular fragment by almost   after shaft width. Lesser degree of displacement of distal tibial fragment. Ankle joint appears intact on this single lateral view. Soft tissue swelling   lower leg/ankle.       Single lateral view right ankle: Imaging from proximal tibial metadiaphysis   through the ankle. There is distal tibial fracture close to the metaphysis   about 7 cm above the tibiotalar joint. Distal fragment displaced anteriorly   by almost half a full shaft width. There is comminuted fibular fracture at a   slightly cephalad level with mild displacement. Plate and screws in the   fibula. 2 tibial screws. RECOMMENDATION:   1. Distal left tibial and fibular metadiaphyseal fractures at about the same   level. There is anterior displacement of distal fragments as discussed above. 2. Displaced distal tibia and fibula fractures. Tibial fracture at a lower   level than fibula fracture. Distal tibial fragment displaced almost full   shaft with anteriorly. Fibula fracture comminuted. Plate and screws in the   distal tibia fibular as discussed above. XR KNEE RIGHT (3 VIEWS)    (Results Pending)          PROCEDURES:  None    CONSULTS:  IP CONSULT TO ORTHOPEDIC SURGERY  IP CONSULT TO CARDIOLOGY    MEDICAL DECISION MAKING:  Patient came as a trauma priority, trauma surgery at bedside for immediate evaluation. Patient had stable airway, bilateral breath sounds. Patient had dopplerable pulses in the lower extremities and open tib-fib fractures. Trauma surgery evaluated patient, obtained multiple imaging. Patient was admitted to the trauma service team for further management and treatment. CRITICAL CARE:  Please see attending note    FINAL IMPRESSION      1. Type I or II open fracture of both tibias, initial encounter          DISPOSITION / Rayne [address] Admitted 01/30/2022 12:57:51 PM      PATIENT REFERRED TO:  No follow-up provider specified. DISCHARGE MEDICATIONS:  There are no discharge medications for this patient.       Monie Yuan DO  Emergency Medicine Resident    (Please note that portions of thisnote were completed with a voice recognition program.  Efforts were made to edit the dictations but occasionally words are mis-transcribed.)       Jessica Aguilera DO  Resident  01/30/22 [id number]

## 2022-01-30 NOTE — ED NOTES
50mcg fentynal IV  verbal order dr Rosio Garcia given in left hand iv.       Morenita Velásquez RN  01/30/22 [id number]

## 2022-01-30 NOTE — ED NOTES
Pt name: Gilberto Hightower  : 1933  Contact phone number: [phone]        Haylie Ga RN  [phone]

## 2022-01-30 NOTE — CONSULTS
Orthopaedic Surgery Consult  (Dr. Mary Hilliard)      CC/Reason for consult:  Bilateral open distal tibia and fibula fractures    HPI:      The patient is a 80 y.o. female who presents to [facility] with bilateral open distal tibia and fibula fractures. Patient states that this morning she was getting out of bed when her legs gave out from under her causing her to fall from standing height. Patient was found on the ground by a family member, EMS was called, and she was subsequently transferred to the emergency department by [address]ston Sanchez Dr. X-rays in the emergency department demonstrated bilateral distal tibia and fibula fractures. Orthopedics was called for further evaluation. At baseline patient states she is able to ambulate with a walker. She states that she mostly gets around her house but is unable to go long distances such as grocery store. She is able to perform most of her activities of daily living but her daughter who is her power of  does help. Denies any numbness/tingling in the extremities. Denies pain elsewhere. Patient has a significant past medical history of congestive heart failure with CAD status post stents and defibrillator, and obesity. She is past orthopedic history of right knee replacement done in 2014 and right ankle ORIF in 2017 by Dr. Shaye Velasquez. Past Medical History:    No past medical history on file. Past Surgical History:    No past surgical history on file.   Medications Prior to Admission:   Prior to Admission medications    Not on File     Allergies:    Adhesive tape and Lisinopril  Social History:   Social History     Socioeconomic History    Marital status: Unknown     Spouse name: Not on file    Number of children: Not on file    Years of education: Not on file    Highest education level: Not on file   Occupational History    Not on file   Tobacco Use    Smoking status: Not on file    Smokeless tobacco: Not on file   Substance and Sexual Activity    Alcohol use: Not on file    Drug use: Not on file    Sexual activity: Not on file   Other Topics Concern    Not on file   Social History Narrative    Not on file     Social Determinants of Health     Financial Resource Strain:     Difficulty of Paying Living Expenses: Not on file   Food Insecurity:     Worried About Running Out of Food in the Last Year: Not on file    Aren of Food in the Last Year: Not on file   Transportation Needs:     Lack of Transportation (Medical): Not on file    Lack of Transportation (Non-Medical): Not on file   Physical Activity:     Days of Exercise per Week: Not on file    Minutes of Exercise per Session: Not on file   Stress:     Feeling of Stress : Not on file   Social Connections:     Frequency of Communication with Friends and Family: Not on file    Frequency of Social Gatherings with Friends and Family: Not on file    Attends Protestant Services: Not on file    Active Member of [address] [place] [address] or Organizations: Not on file    Attends Club or Organization Meetings: Not on file    Marital Status: Not on file   Intimate Partner Violence:     Fear of Current or Ex-Partner: Not on file    Emotionally Abused: Not on file    Physically Abused: Not on file    Sexually Abused: Not on file   Housing Stability:     Unable to Pay for Housing in the Last Year: Not on file    Number of [place] in the Last Year: Not on file    Unstable Housing in the Last Year: Not on file     Family History:  No family history on file. ROS:   Constitutional: Negative for fever and chills. Respiratory: Negative for cough. Cardiovascular: Negative for chest pain. Musculoskeletal: Positive for left and right ankle pain. Skin: Positive for bilateral ankle wounds. Neurological: Positive for numbness, tingling. PE:  Blood pressure (!) 148/87, pulse 97, temperature 97.5 °F (36.4 °C), resp. rate 12, weight 260 lb (117.9 kg), SpO2 97 %.     Gen: Alert and oriented, NAD, cooperative. Head: Normocephalic, atraumatic. Cardiovascular: Tachycardic. Respiratory: Chest symmetric, no accessory muscle use. Pelvis: Stable to anterior and lateral compression without pain. RUE: Skin intact. Mild ecchymoses present over dorsum of right hand. No abrasion, deformity, or lacerations. Non tender to palpation. No crepitus. Compartments soft and easily compressible. Full ROM at shoulder without pain. Full ROM at elbow without pain. Ulnar/median/AIN/PIN/radial motor intact. Axillary/MCN/median/ulnar/radial nerves SILT. Radial pulse 2+ with BCR.    LUE: Skin intact. No ecchymoses, abrasion, deformity, or lacerations. Non tender to palpation. No crepitus. Compartments soft and easily compressible. Full ROM at shoulder without pain. Full ROM at elbow without pain. Ulnar/median/AIN/PIN/radial motor intact. Axillary/MCN/median/ulnar/radial nerves SILT. Radial pulse 2+ with BCR. RLE: Large 8 cm wound to the anterior medial aspect of the distal tibia spanning longitudinally. There is exposed bone. Compartments soft and easily compressible. EHL/FHL/TA/GS complex motor intact. Sural/saphenous/SPN/DPN/plantar nerve distribution SILT. Patient denies hip pain to anterior lateral compression. Patient is mildly tender to palpation about the lateral knee. Knee ligament testing deferred due to injury. DP dopplerable with BCR, difficult to doppler PT.     LLE: Large 8 cm wound to the anterior medial aspect of the distal tibia traversing circumferentially along medial aspect of lower extremity. Compartments soft and easily compressible. EHL/FHL/TA/GS complex motor intact. Sural/saphenous/SPN/DPN/plantar nerve distribution SILT. Patient denies hip pain to anterior lateral compression. Nontender to palpation about the knee and proximal tibia. Knee ligament testing deferred due to injury. DP dopplerable with BCR, difficult to doppler PT.        Labs:  Recent Labs     01/30/22  1201   WBC 12.6*   HGB 12.2   HCT 36.8      INR 1.0      K 4.5   BUN 35*   CREATININE 1.45*   GLUCOSE 114*        Imagin views of the right tibia/fibula in a skeletally mature individual demonstrating a distal tibia and fibula fracture. The tibia fracture is spiral mildly shortened, and laterally displaced, with anterior angulation. The distal fibula is slightly more proximal segmented and comminuted nature. There is hardware appreciable with a fibular plate, syndesmotic screws, and medial malleolus screws. She is noted to have a knee replacement. There is no other osseous abnormalities noted. No dislocations, or lytic or blastic lesions. 2 views of the left ankle in a skeletally mature individual demonstrating a distal tibia and same level fibula fracture. The tibia is spiral in nature with posterior lateral displacement. The fibula is comminuted and segmented with posterior angulation lateral displacement. There is no other osseous abnormalities noted. No dislocations, or lytic or blastic lesions noted. Assessment/Plan: 80 y.o. female who fell from standing height, being seen for:    -Bilateral open distal tibia/fibula fracture    -Plan for irrigation debridement at bedside with splint placement.  -2L of irrigation through both wounds for a total of 4 liters irrigated   -We will plan to take this to the operating room for bilateral lower extremity irrigation debridement, and open reduction internal fixation versus closed reduction external fixation  -WB status: Nonweightbearing bilateral lower extremity  -Trauma team has consulted cardiology who will be seeing patient for surgical clearance  -Diet: NPO at midnight   -Tetanus was given by trauma team in the emergency department  -Ancef 2 g was given in the emergency department. Recommend continuing Ancef 2 g every 8 hours.   -DVT ppx: Please hold in anticipation for surgery  -Patient and daughter were consented and correct locations marked  -F/u VitD level  -Pain control per primary team  -Ice and elevate extremity for pain and swelling  -Please contact ortho with any questions    Procedure Note:  Procedure: Risks, benefits, and alternatives have been discussed regarding closed reduction of the fractures. Patient agreed to move forward with the proposed procedure. After adequate pain medication was administered we proceeded to manually reduce the fracture with appreciable motion indicating improved alignment. Splint was applied at this point and post splint films were obtained. Unfortunately due to the unstable nature of the fracture, we were unable to maintain anatomic alignment of fracture. Patient tolerated the procedure well and expressed interval improvement in symptoms. All questions and concerns were addressed at this point. Katie Valdez DO  Resident Physician, PGY-1   Orthopaedic Surgery  1:47 PM 1/30/2022    This note is created with the assistance of a speech recognition program. While intending to generate a document that actually reflects the content of the visit, the document can still have some errors including those of syntax and sound a like substitutions which may escape proof reading. In such instances, actual meaning can be extrapolated by contextual diversion. PGY-2 Addendum    Patient seen and examined. Agree with above. 80 y.o. female being seen after a fall from standing height in which she sustained bilateral open distal tibia/fibula fractures. Denies pain elsewhere. States she ambulates with a walker around the house but otherwise does not really get around. States that she has had a total knee on right and prior ankle fracture on the left. Denies any new numbness/tingling since the fall. On exam, patient with bilateral open wounds to distal tibia/fibula. On right lower extremity, laceration with exposed bone measuring approximately 8cm in a longitudinal direction.  On left, also approximately 8cm

## 2022-01-31 NOTE — PROGRESS NOTES
Trauma Tertiary Survey    Admit Date: 1/30/2022  Hospital day 1    [facility]     History reviewed. No pertinent past medical history. Scheduled Meds:   sodium chloride flush  5-40 mL IntraVENous 2 times per day    polyethylene glycol  17 g Oral Daily    ceFAZolin (ANCEF) IVPB  2,000 mg IntraVENous Q8H    acetaminophen  1,000 mg Oral 3 times per day    methocarbamol  750 mg Oral Q8H    gabapentin  300 mg Oral TID     Continuous Infusions:   sodium chloride      lactated ringers       PRN Meds:iopamidol, sodium chloride flush, sodium chloride, oxyCODONE, ondansetron    Subjective:     Patient has complaints b/l LE pain. Pain is moderate, worsens with movement, and some relief by rest.  There is not associated numbness, tingling, weakness. Objective:     Patient Vitals for the past 8 hrs:   BP Temp Temp src Pulse Resp   01/30/22 2100 (!) 135/94 98.6 °F (37 °C) Oral 85 15       I/O last 3 completed shifts: In: 750 [I.V.:750]  Out: -   No intake/output data recorded. Radiology:  XR SHOULDER RIGHT (MIN 2 VIEWS)    Result Date: 1/30/2022  EXAMINATION: THREE XRAY VIEWS OF THE RIGHT SHOULDER 1/30/2022 11:06 pm COMPARISON: None. HISTORY: ORDERING SYSTEM PROVIDED HISTORY: right shoulder pain TECHNOLOGIST PROVIDED HISTORY: right shoulder pain Reason for Exam: fall,pain FINDINGS: No acute displaced fracture traumatic malalignment. Mild-to-moderate osteoarthritic changes. Degenerate change without evidence acute fracture dislocation. XR KNEE RIGHT (3 VIEWS)    Result Date: 1/30/2022  EXAMINATION: THREE XRAY VIEWS OF THE RIGHT KNEE 1/30/2022 4:51 pm COMPARISON: Right knee radiograph performed January 30, 2022. HISTORY: ORDERING SYSTEM PROVIDED HISTORY: Surgical planning - marker ball please TECHNOLOGIST PROVIDED HISTORY: Please take films with marker ball in view for surgery planning. Thanks! Surgical planning - marker ball please FINDINGS: Right knee arthroplasty noted. Hardware is intact.   No acute osseous abnormality. Cast material is in place. Right knee arthroplasty with intact hardware. XR KNEE RIGHT (3 VIEWS)    Result Date: 1/30/2022  EXAMINATION: THREE XRAY VIEWS OF THE RIGHT KNEE 1/30/2022 9:56 am COMPARISON: None. HISTORY: ORDERING SYSTEM PROVIDED HISTORY: trauma TECHNOLOGIST PROVIDED HISTORY: trauma FINDINGS: There is a total knee arthroplasty . No acute fracture or dislocation. No acute hardware failure or loosening. Grossly normal alignment. There is a joint effusion. 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 XR TIBIA FIBULA LEFT (2 VIEWS)    Result Date: 1/30/2022  EXAMINATION: TWO XRAY VIEWS OF THE LEFT TIBIA AND FIBULA; TWO XRAY VIEWS OF THE RIGHT TIBIA AND FIBULA; THREE XRAY VIEWS OF THE LEFT FOOT; THREE XRAY VIEWS OF THE RIGHT FOOT 1/30/2022 12:55 pm COMPARISON: Right and left CT tibia/fibula performed 01/30/2022. HISTORY: ORDERING SYSTEM PROVIDED HISTORY: trauma TECHNOLOGIST PROVIDED HISTORY: trauma FINDINGS: LEFT TIBIA/FIBULA: Acute displaced fracture of the distal tibial diaphysis which demonstrates improved alignment and angulation. There is a comminuted and displaced acute fracture of the distal fibular diaphysis with alignment grossly unchanged. Nondisplaced fracture of the tip of the lateral malleolus is better evaluated on prior CT. Diffuse osteopenia. Cast material is in place. LEFT FOOT: Cast material is in place which partially obscures evaluation of the soft tissues and osseous structures. Diffuse osteopenia. No definite acute displaced fracture demonstrated. RIGHT TIBIA/FIBULA/ANKLE: Right knee arthroplasty. Hardware is grossly intact. There is an acute displaced fracture of the distal tibial diaphysis. Alignment is grossly unchanged from prior examination. There is an acute segmental fracture of the mid/distal fibular diaphysis, alignment grossly unchanged.   There is screw and plate fixation of the distal tibia/fibula in the mediolateral malleoli. Hardware appears grossly intact. The talar dome appears grossly intact. Cast material is in place. RIGHT FOOT: Cast material is in place which partially obscures evaluation of the soft tissues and osseous structures. Diffuse osteopenia. No definite acute displaced fracture demonstrated. 1. Acute displaced fracture of the left distal tibial diaphysis with improved alignment. 2. Comminuted and displaced fracture of the left distal fibular diaphysis, alignment grossly unchanged. 3. Nondisplaced fracture of the tip of the left lateral malleolus, better evaluated on prior CT. 4. Cast material is in place on the left. 5. Acute displaced fracture of the right distal tibial diaphysis, alignment grossly unchanged. 6. Acute segmental fracture of the mid/distal right fibula, alignment grossly unchanged. 7. Right knee arthroplasty with intact hardware. 8. Cast material in place on the right. XR TIBIA FIBULA RIGHT (2 VIEWS)    Result Date: 1/30/2022  EXAMINATION: TWO XRAY VIEWS OF THE LEFT TIBIA AND FIBULA; TWO XRAY VIEWS OF THE RIGHT TIBIA AND FIBULA; THREE XRAY VIEWS OF THE LEFT FOOT; THREE XRAY VIEWS OF THE RIGHT FOOT 1/30/2022 12:55 pm COMPARISON: Right and left CT tibia/fibula performed 01/30/2022. HISTORY: ORDERING SYSTEM PROVIDED HISTORY: trauma TECHNOLOGIST PROVIDED HISTORY: trauma FINDINGS: LEFT TIBIA/FIBULA: Acute displaced fracture of the distal tibial diaphysis which demonstrates improved alignment and angulation. There is a comminuted and displaced acute fracture of the distal fibular diaphysis with alignment grossly unchanged. Nondisplaced fracture of the tip of the lateral malleolus is better evaluated on prior CT. Diffuse osteopenia. Cast material is in place. LEFT FOOT: Cast material is in place which partially obscures evaluation of the soft tissues and osseous structures. Diffuse osteopenia. No definite acute displaced fracture demonstrated.  RIGHT TIBIA/FIBULA/ANKLE: Right knee arthroplasty. Hardware is grossly intact. There is an acute displaced fracture of the distal tibial diaphysis. Alignment is grossly unchanged from prior examination. There is an acute segmental fracture of the mid/distal fibular diaphysis, alignment grossly unchanged. There is screw and plate fixation of the distal tibia/fibula in the mediolateral malleoli. Hardware appears grossly intact. The talar dome appears grossly intact. Cast material is in place. RIGHT FOOT: Cast material is in place which partially obscures evaluation of the soft tissues and osseous structures. Diffuse osteopenia. No definite acute displaced fracture demonstrated. 1. Acute displaced fracture of the left distal tibial diaphysis with improved alignment. 2. Comminuted and displaced fracture of the left distal fibular diaphysis, alignment grossly unchanged. 3. Nondisplaced fracture of the tip of the left lateral malleolus, better evaluated on prior CT. 4. Cast material is in place on the left. 5. Acute displaced fracture of the right distal tibial diaphysis, alignment grossly unchanged. 6. Acute segmental fracture of the mid/distal right fibula, alignment grossly unchanged. 7. Right knee arthroplasty with intact hardware. 8. Cast material in place on the right. XR ANKLE LEFT (2 VIEWS)    Result Date: 1/30/2022  EXAMINATION: 1 XRAY VIEWS OF THE LEFT ANKLE; 1 XRAY VIEWS OF THE RIGHT ANKLE 1/30/2022 12:01 pm COMPARISON: No priors HISTORY: ORDERING SYSTEM PROVIDED HISTORY: trauma TECHNOLOGIST PROVIDED HISTORY: trauma     Single lateral view of left ankle: Imaging from proximal tibial meta diaphysis through the ankle. Transversely oriented fracture distal tibia and fibular metadiaphysis at roughly the same level about 7 cm proximal to tibiotalar joint. Anterior displacement of distal fibular fragment by almost after shaft width. Lesser degree of displacement of distal tibial fragment.  Ankle joint appears intact on this single lateral view. Soft tissue swelling lower leg/ankle. Single lateral view right ankle: Imaging from proximal tibial metadiaphysis through the ankle. There is distal tibial fracture close to the metaphysis about 7 cm above the tibiotalar joint. Distal fragment displaced anteriorly by almost half a full shaft width. There is comminuted fibular fracture at a slightly cephalad level with mild displacement. Plate and screws in the fibula. 2 tibial screws. RECOMMENDATION: 1. Distal left tibial and fibular metadiaphyseal fractures at about the same level. There is anterior displacement of distal fragments as discussed above. 2. Displaced distal tibia and fibula fractures. Tibial fracture at a lower level than fibula fracture. Distal tibial fragment displaced almost full shaft with anteriorly. Fibula fracture comminuted. Plate and screws in the distal tibia fibular as discussed above. XR ANKLE RIGHT (2 VIEWS)    Result Date: 1/30/2022  EXAMINATION: 1 XRAY VIEWS OF THE LEFT ANKLE; 1 XRAY VIEWS OF THE RIGHT ANKLE 1/30/2022 12:01 pm COMPARISON: No priors HISTORY: ORDERING SYSTEM PROVIDED HISTORY: trauma TECHNOLOGIST PROVIDED HISTORY: trauma     Single lateral view of left ankle: Imaging from proximal tibial meta diaphysis through the ankle. Transversely oriented fracture distal tibia and fibular metadiaphysis at roughly the same level about 7 cm proximal to tibiotalar joint. Anterior displacement of distal fibular fragment by almost after shaft width. Lesser degree of displacement of distal tibial fragment. Ankle joint appears intact on this single lateral view. Soft tissue swelling lower leg/ankle. Single lateral view right ankle: Imaging from proximal tibial metadiaphysis through the ankle. There is distal tibial fracture close to the metaphysis about 7 cm above the tibiotalar joint. Distal fragment displaced anteriorly by almost half a full shaft width.   There is comminuted fibular fracture at a slightly cephalad level with mild displacement. Plate and screws in the fibula. 2 tibial screws. RECOMMENDATION: 1. Distal left tibial and fibular metadiaphyseal fractures at about the same level. There is anterior displacement of distal fragments as discussed above. 2. Displaced distal tibia and fibula fractures. Tibial fracture at a lower level than fibula fracture. Distal tibial fragment displaced almost full shaft with anteriorly. Fibula fracture comminuted. Plate and screws in the distal tibia fibular as discussed above. XR ANKLE LEFT (MIN 3 VIEWS)    Result Date: 1/30/2022  EXAMINATION: THREE XRAY VIEWS OF THE LEFT ANKLE 1/30/2022 12:55 pm COMPARISON: 01/30/2022 at 11:50 hours HISTORY: ORDERING SYSTEM PROVIDED HISTORY: Post-Splint TECHNOLOGIST PROVIDED HISTORY: Post-Splint FINDINGS: Interval application of a back slab cast to the lower leg and foot. Redemonstration of displaced distal tibia and fibula metadiaphyseal fractures. Fibular fractures are slightly overriding in the AP view but appears similar to prior on lateral view. Tibial distal fracture fragment displaced medially by half a shaft width. There is now posterior displacement of the fragment whereas previously there was anterior displacement of the distal fragment. Interval placement of back slab cast.  Fractures again identified of the tibia and fibula as discussed above. There is now posterior displacement of distal tibial fragment by almost half a shaft with whereas previously it was anteriorly displaced. XR ANKLE RIGHT (MIN 3 VIEWS)    Result Date: 1/30/2022  EXAMINATION: TWO XRAY VIEWS OF THE LEFT TIBIA AND FIBULA; TWO XRAY VIEWS OF THE RIGHT TIBIA AND FIBULA; THREE XRAY VIEWS OF THE LEFT FOOT; THREE XRAY VIEWS OF THE RIGHT FOOT 1/30/2022 12:55 pm COMPARISON: Right and left CT tibia/fibula performed 01/30/2022.  HISTORY: ORDERING SYSTEM PROVIDED HISTORY: trauma TECHNOLOGIST PROVIDED HISTORY: trauma FINDINGS: LEFT TIBIA/FIBULA: Acute displaced fracture of the distal tibial diaphysis which demonstrates improved alignment and angulation. There is a comminuted and displaced acute fracture of the distal fibular diaphysis with alignment grossly unchanged. Nondisplaced fracture of the tip of the lateral malleolus is better evaluated on prior CT. Diffuse osteopenia. Cast material is in place. LEFT FOOT: Cast material is in place which partially obscures evaluation of the soft tissues and osseous structures. Diffuse osteopenia. No definite acute displaced fracture demonstrated. RIGHT TIBIA/FIBULA/ANKLE: Right knee arthroplasty. Hardware is grossly intact. There is an acute displaced fracture of the distal tibial diaphysis. Alignment is grossly unchanged from prior examination. There is an acute segmental fracture of the mid/distal fibular diaphysis, alignment grossly unchanged. There is screw and plate fixation of the distal tibia/fibula in the mediolateral malleoli. Hardware appears grossly intact. The talar dome appears grossly intact. Cast material is in place. RIGHT FOOT: Cast material is in place which partially obscures evaluation of the soft tissues and osseous structures. Diffuse osteopenia. No definite acute displaced fracture demonstrated. 1. Acute displaced fracture of the left distal tibial diaphysis with improved alignment. 2. Comminuted and displaced fracture of the left distal fibular diaphysis, alignment grossly unchanged. 3. Nondisplaced fracture of the tip of the left lateral malleolus, better evaluated on prior CT. 4. Cast material is in place on the left. 5. Acute displaced fracture of the right distal tibial diaphysis, alignment grossly unchanged. 6. Acute segmental fracture of the mid/distal right fibula, alignment grossly unchanged. 7. Right knee arthroplasty with intact hardware. 8. Cast material in place on the right.      XR FOOT LEFT (MIN 3 VIEWS)    Result Date: 1/30/2022  EXAMINATION: TWO XRAY VIEWS OF THE LEFT TIBIA AND FIBULA; TWO XRAY VIEWS OF THE RIGHT TIBIA AND FIBULA; THREE XRAY VIEWS OF THE LEFT FOOT; THREE XRAY VIEWS OF THE RIGHT FOOT 1/30/2022 12:55 pm COMPARISON: Right and left CT tibia/fibula performed 01/30/2022. HISTORY: ORDERING SYSTEM PROVIDED HISTORY: trauma TECHNOLOGIST PROVIDED HISTORY: trauma FINDINGS: LEFT TIBIA/FIBULA: Acute displaced fracture of the distal tibial diaphysis which demonstrates improved alignment and angulation. There is a comminuted and displaced acute fracture of the distal fibular diaphysis with alignment grossly unchanged. Nondisplaced fracture of the tip of the lateral malleolus is better evaluated on prior CT. Diffuse osteopenia. Cast material is in place. LEFT FOOT: Cast material is in place which partially obscures evaluation of the soft tissues and osseous structures. Diffuse osteopenia. No definite acute displaced fracture demonstrated. RIGHT TIBIA/FIBULA/ANKLE: Right knee arthroplasty. Hardware is grossly intact. There is an acute displaced fracture of the distal tibial diaphysis. Alignment is grossly unchanged from prior examination. There is an acute segmental fracture of the mid/distal fibular diaphysis, alignment grossly unchanged. There is screw and plate fixation of the distal tibia/fibula in the mediolateral malleoli. Hardware appears grossly intact. The talar dome appears grossly intact. Cast material is in place. RIGHT FOOT: Cast material is in place which partially obscures evaluation of the soft tissues and osseous structures. Diffuse osteopenia. No definite acute displaced fracture demonstrated. 1. Acute displaced fracture of the left distal tibial diaphysis with improved alignment. 2. Comminuted and displaced fracture of the left distal fibular diaphysis, alignment grossly unchanged. 3. Nondisplaced fracture of the tip of the left lateral malleolus, better evaluated on prior CT. 4. Cast material is in place on the left.  5. Acute displaced fracture of the right distal tibial diaphysis, alignment grossly unchanged. 6. Acute segmental fracture of the mid/distal right fibula, alignment grossly unchanged. 7. Right knee arthroplasty with intact hardware. 8. Cast material in place on the right. XR FOOT RIGHT (MIN 3 VIEWS)    Result Date: 1/30/2022  EXAMINATION: TWO XRAY VIEWS OF THE LEFT TIBIA AND FIBULA; TWO XRAY VIEWS OF THE RIGHT TIBIA AND FIBULA; THREE XRAY VIEWS OF THE LEFT FOOT; THREE XRAY VIEWS OF THE RIGHT FOOT 1/30/2022 12:55 pm COMPARISON: Right and left CT tibia/fibula performed 01/30/2022. HISTORY: ORDERING SYSTEM PROVIDED HISTORY: trauma TECHNOLOGIST PROVIDED HISTORY: trauma FINDINGS: LEFT TIBIA/FIBULA: Acute displaced fracture of the distal tibial diaphysis which demonstrates improved alignment and angulation. There is a comminuted and displaced acute fracture of the distal fibular diaphysis with alignment grossly unchanged. Nondisplaced fracture of the tip of the lateral malleolus is better evaluated on prior CT. Diffuse osteopenia. Cast material is in place. LEFT FOOT: Cast material is in place which partially obscures evaluation of the soft tissues and osseous structures. Diffuse osteopenia. No definite acute displaced fracture demonstrated. RIGHT TIBIA/FIBULA/ANKLE: Right knee arthroplasty. Hardware is grossly intact. There is an acute displaced fracture of the distal tibial diaphysis. Alignment is grossly unchanged from prior examination. There is an acute segmental fracture of the mid/distal fibular diaphysis, alignment grossly unchanged. There is screw and plate fixation of the distal tibia/fibula in the mediolateral malleoli. Hardware appears grossly intact. The talar dome appears grossly intact. Cast material is in place. RIGHT FOOT: Cast material is in place which partially obscures evaluation of the soft tissues and osseous structures. Diffuse osteopenia.   No definite acute displaced fracture demonstrated. 1. Acute displaced fracture of the left distal tibial diaphysis with improved alignment. 2. Comminuted and displaced fracture of the left distal fibular diaphysis, alignment grossly unchanged. 3. Nondisplaced fracture of the tip of the left lateral malleolus, better evaluated on prior CT. 4. Cast material is in place on the left. 5. Acute displaced fracture of the right distal tibial diaphysis, alignment grossly unchanged. 6. Acute segmental fracture of the mid/distal right fibula, alignment grossly unchanged. 7. Right knee arthroplasty with intact hardware. 8. Cast material in place on the right. CT HEAD WO CONTRAST    Result Date: 1/30/2022  EXAMINATION: CT OF THE CERVICAL SPINE WITHOUT CONTRAST; CT OF THE HEAD WITHOUT CONTRAST 1/30/2022 8:42 am; 1/30/2022 9:20 am TECHNIQUE: CT of the cervical spine was performed without the administration of intravenous contrast. Multiplanar reformatted images are provided for review. Dose modulation, iterative reconstruction, and/or weight based adjustment of the mA/kV was utilized to reduce the radiation dose to as low as reasonably achievable.; CT of the head was performed without the administration of intravenous contrast. Dose modulation, iterative reconstruction, and/or weight based adjustment of the mA/kV was utilized to reduce the radiation dose to as low as reasonably achievable. COMPARISON: None. HISTORY: ORDERING SYSTEM PROVIDED HISTORY: Trauma TECHNOLOGIST PROVIDED HISTORY: Trauma Reason for Exam: trauma; ORDERING SYSTEM PROVIDED HISTORY: Trauma TECHNOLOGIST PROVIDED HISTORY: Trauma Reason for Exam: trauma CT BRAIN FINDINGS: BRAIN/VENTRICLES: The cerebral hemispheres, brainstem, and cerebellum have a normal appearance . The falx is midline. The ventricles and peripheral sulci are normal.  There is no sign of a space occupying lesion, infarction, or hemorrhage. Orbits: Portion of the orbits demonstrate no acute abnormality.  SINUSES: . The  imaged portions of the paranasal sinuses are clear. The mastoids and the middle ear chambers are clear. SOFT TISSUES/SKULL:  No acute abnormality of the visualized skull or soft tissues. Vascular calcifications are seen compatible with atherosclerotic disease. CT CERVICAL SPINE FINDINGS: The cervical spine demonstrates decreasedmineralization with normal cervical lordosis. Fracture involving the spinous process of C6, possibly old. There is no evidence of subluxation. There is loss of disc height with eburnation of the vertebral endplates at the K6-6, H4-1, C5-6, C6-7 levels. There are anterior and posterior marginal osteophytes at multiple levels. Mild to moderate spinal stenosis at C5-6 and C6-7. Vonne Dach There is bilateral facet hypertrophy at multiple levels throughout the cervical spine. The pedicles and posterior elements are otherwise intact. The prevertebral and paravertebral soft tissues are unremarkable. The atlanto-dens interval and dens are intact. The visualized lung apices are clear. Vascular calcifications are seen compatible with atherosclerotic disease. No acute intracranial abnormalities are noted. Multilevel cervical spondylosis and degenerative disc disease. Mild to moderate spinal stenosis at C5-6 and C6-7 Probable chronic fracture of C6 spinous process. Please correlate clinically RECOMMENDATIONS: Further evaluation of the cervical spine should be obtained with MR imaging if clinically indicated. CT CERVICAL SPINE WO CONTRAST    Result Date: 1/30/2022  EXAMINATION: CT OF THE CERVICAL SPINE WITHOUT CONTRAST; CT OF THE HEAD WITHOUT CONTRAST 1/30/2022 8:42 am; 1/30/2022 9:20 am TECHNIQUE: CT of the cervical spine was performed without the administration of intravenous contrast. Multiplanar reformatted images are provided for review. Dose modulation, iterative reconstruction, and/or weight based adjustment of the mA/kV was utilized to reduce the radiation dose to as low as reasonably achievable. ; CT of the head was performed without the administration of intravenous contrast. Dose modulation, iterative reconstruction, and/or weight based adjustment of the mA/kV was utilized to reduce the radiation dose to as low as reasonably achievable. COMPARISON: None. HISTORY: ORDERING SYSTEM PROVIDED HISTORY: Trauma TECHNOLOGIST PROVIDED HISTORY: Trauma Reason for Exam: trauma; ORDERING SYSTEM PROVIDED HISTORY: Trauma TECHNOLOGIST PROVIDED HISTORY: Trauma Reason for Exam: trauma CT BRAIN FINDINGS: BRAIN/VENTRICLES: The cerebral hemispheres, brainstem, and cerebellum have a normal appearance . The falx is midline. The ventricles and peripheral sulci are normal.  There is no sign of a space occupying lesion, infarction, or hemorrhage. Orbits: Portion of the orbits demonstrate no acute abnormality. SINUSES: . The  imaged portions of the paranasal sinuses are clear. The mastoids and the middle ear chambers are clear. SOFT TISSUES/SKULL:  No acute abnormality of the visualized skull or soft tissues. Vascular calcifications are seen compatible with atherosclerotic disease. CT CERVICAL SPINE FINDINGS: The cervical spine demonstrates decreasedmineralization with normal cervical lordosis. Fracture involving the spinous process of C6, possibly old. There is no evidence of subluxation. There is loss of disc height with eburnation of the vertebral endplates at the I3-1, Z4-8, C5-6, C6-7 levels. There are anterior and posterior marginal osteophytes at multiple levels. Mild to moderate spinal stenosis at C5-6 and C6-7. Clement Fly There is bilateral facet hypertrophy at multiple levels throughout the cervical spine. The pedicles and posterior elements are otherwise intact. The prevertebral and paravertebral soft tissues are unremarkable. The atlanto-dens interval and dens are intact. The visualized lung apices are clear. Vascular calcifications are seen compatible with atherosclerotic disease.      No acute intracranial abnormalities are noted. Multilevel cervical spondylosis and degenerative disc disease. Mild to moderate spinal stenosis at C5-6 and C6-7 Probable chronic fracture of C6 spinous process. Please correlate clinically RECOMMENDATIONS: Further evaluation of the cervical spine should be obtained with MR imaging if clinically indicated. CT TIBIA FIBULA LEFT WO CONTRAST    Result Date: 1/30/2022  EXAMINATION: CT OF THE RIGHT TIBIA AND FIBULA WITHOUT CONTRAST; CT OF THE LEFT TIBIA AND FIBULA WITHOUT CONTRAST 1/30/2022 1:57 pm; 1/30/2022 1:13 pm TECHNIQUE: CT of the right tibia and fibula was performed without the administration of intravenous contrast.  Multiplanar reformatted images are provided for review. Dose modulation, iterative reconstruction, and/or weight based adjustment of the mA/kV was utilized to reduce the radiation dose to as low as reasonably achievable.; CT of the left tibia and fibula was performed without the administration of intravenous contrast.  Multiplanar reformatted images are provided for review. Dose modulation, iterative reconstruction, and/or weight based adjustment of the mA/kV was utilized to reduce the radiation dose to as low as reasonably achievable. COMPARISON: Right and left tibia and fibula radiographs same day HISTORY ORDERING SYSTEM PROVIDED HISTORY: trauma TECHNOLOGIST PROVIDED HISTORY: trauma Decision Support Exception - unselect if not a suspected or confirmed emergency medical condition->Emergency Medical Condition (MA) FINDINGS: CT RIGHT TIBIA AND FIBULA: Bones: Postoperative changes of right total knee arthroplasty. Prior ORIF of the lateral malleolus with a lateral plate and screw construct and syndesmotic screws in place. The hardware appears intact. Prior ORIF of the medial malleolus with 2 screws traversing the medial malleolus.   Acute and displaced fracture of the distal diaphysis of the right tibia with distal fracture fragment displaced laterally by up to 1.6 cm (image 61 series 310). Acute, segmental fracture of the mid diaphysis of the fibula with major fracture fragments displaced by up to approximately 1.7 cm (images 65 through 76 series 310). Soft Tissue: Soft tissue edema and subcutaneous gas with laceration at the anterior aspect of the distal right tibia. Findings consistent with open fracture. No radiopaque foreign body identified. Atherosclerotic disease noted. Joint: Right total knee arthroplasty. The hardware appears intact without evidence for loosening or failure. Anatomic alignment of the knee and ankle with no dislocation identified. CT LEFT TIBIA AND FIBULA: Bones: Bones are osteopenic. Acute and displaced fracture of the distal diaphysis of the left tibia. The distal fracture fragment is displaced laterally by up to 1.4 cm (image 52 series 314). Acute, comminuted, and displaced fracture of the distal diaphysis of the lateral malleolus with distal fracture fragment displaced by up to 1.2 cm (image 64 series 314). Acute nondisplaced fracture of the distal tip of the lateral malleolus on the left (images 216 through 218 series 315). Soft Tissue: Soft tissue edema and subcutaneous gas about the left tibia and fibula fracture sites with soft tissue defects seen anteriorly at the level of the distal tibia. Findings consistent with open fracture. No radiopaque foreign body identified. Atherosclerotic disease noted. Joint: Anatomic alignment of the left knee and left ankle with no dislocation identified. CT right tibia and fibula: 1. Acute and displaced fracture of the distal diaphysis of the right tibia. 2. Acute segmental fracture of the mid diaphysis of the right fibula. 3. Tissue edema, subcutaneous gas, and anterior laceration consistent with open fractures. CT left tibia and fibula: 1. Acute and displaced fractures of the distal diaphysis of the left tibia and fibula with surrounding soft tissue edema and gas consistent with open fractures.  2. Nondisplaced fracture of the tip of the left lateral malleolus. 3. Osteopenia. CT TIBIA FIBULA RIGHT WO CONTRAST    Result Date: 1/30/2022  EXAMINATION: CT OF THE RIGHT TIBIA AND FIBULA WITHOUT CONTRAST; CT OF THE LEFT TIBIA AND FIBULA WITHOUT CONTRAST 1/30/2022 1:57 pm; 1/30/2022 1:13 pm TECHNIQUE: CT of the right tibia and fibula was performed without the administration of intravenous contrast.  Multiplanar reformatted images are provided for review. Dose modulation, iterative reconstruction, and/or weight based adjustment of the mA/kV was utilized to reduce the radiation dose to as low as reasonably achievable.; CT of the left tibia and fibula was performed without the administration of intravenous contrast.  Multiplanar reformatted images are provided for review. Dose modulation, iterative reconstruction, and/or weight based adjustment of the mA/kV was utilized to reduce the radiation dose to as low as reasonably achievable. COMPARISON: Right and left tibia and fibula radiographs same day HISTORY ORDERING SYSTEM PROVIDED HISTORY: trauma TECHNOLOGIST PROVIDED HISTORY: trauma Decision Support Exception - unselect if not a suspected or confirmed emergency medical condition->Emergency Medical Condition (MA) FINDINGS: CT RIGHT TIBIA AND FIBULA: Bones: Postoperative changes of right total knee arthroplasty. Prior ORIF of the lateral malleolus with a lateral plate and screw construct and syndesmotic screws in place. The hardware appears intact. Prior ORIF of the medial malleolus with 2 screws traversing the medial malleolus. Acute and displaced fracture of the distal diaphysis of the right tibia with distal fracture fragment displaced laterally by up to 1.6 cm (image 61 series 310). Acute, segmental fracture of the mid diaphysis of the fibula with major fracture fragments displaced by up to approximately 1.7 cm (images 65 through 76 series 310).  Soft Tissue: Soft tissue edema and subcutaneous gas with laceration at the anterior aspect of the distal right tibia. Findings consistent with open fracture. No radiopaque foreign body identified. Atherosclerotic disease noted. Joint: Right total knee arthroplasty. The hardware appears intact without evidence for loosening or failure. Anatomic alignment of the knee and ankle with no dislocation identified. CT LEFT TIBIA AND FIBULA: Bones: Bones are osteopenic. Acute and displaced fracture of the distal diaphysis of the left tibia. The distal fracture fragment is displaced laterally by up to 1.4 cm (image 52 series 314). Acute, comminuted, and displaced fracture of the distal diaphysis of the lateral malleolus with distal fracture fragment displaced by up to 1.2 cm (image 64 series 314). Acute nondisplaced fracture of the distal tip of the lateral malleolus on the left (images 216 through 218 series 315). Soft Tissue: Soft tissue edema and subcutaneous gas about the left tibia and fibula fracture sites with soft tissue defects seen anteriorly at the level of the distal tibia. Findings consistent with open fracture. No radiopaque foreign body identified. Atherosclerotic disease noted. Joint: Anatomic alignment of the left knee and left ankle with no dislocation identified. CT right tibia and fibula: 1. Acute and displaced fracture of the distal diaphysis of the right tibia. 2. Acute segmental fracture of the mid diaphysis of the right fibula. 3. Tissue edema, subcutaneous gas, and anterior laceration consistent with open fractures. CT left tibia and fibula: 1. Acute and displaced fractures of the distal diaphysis of the left tibia and fibula with surrounding soft tissue edema and gas consistent with open fractures. 2. Nondisplaced fracture of the tip of the left lateral malleolus. 3. Osteopenia.      CT CHEST ABDOMEN PELVIS WO CONTRAST    Result Date: 1/30/2022  EXAMINATION: CT OF THE CHEST, ABDOMEN, AND PELVIS WITHOUT CONTRAST; CT OF THE THORACIC SPINE WITHOUT CONTRAST; CT OF THE LUMBAR SPINE WITHOUT CONTRAST 1/30/2022 11:43 am TECHNIQUE: CT of the chest, abdomen and pelvis was performed without the administration of intravenous contrast. Multiplanar reformatted images are provided for review. Dose modulation, iterative reconstruction, and/or weight based adjustment of the mA/kV was utilized to reduce the radiation dose to as low as reasonably achievable.; CT of the thoracic spine was performed without the administration of intravenous contrast. Multiplanar reformatted images are provided for review. Dose modulation, iterative reconstruction, and/or weight based adjustment of the mA/kV was utilized to reduce the radiation dose to as low as reasonably achievable.; CT of the lumbar spine was performed without the administration of intravenous contrast. Multiplanar reformatted images are provided for review. Adjustment of mA and/or kV according to patient size was utilized. Dose modulation, iterative reconstruction, and/or weight based adjustment of the mA/kV was utilized to reduce the radiation dose to as low as reasonably achievable. COMPARISON: None HISTORY: ORDERING SYSTEM PROVIDED HISTORY: Trauma TECHNOLOGIST PROVIDED HISTORY: Trauma Reason for Exam: trauma FINDINGS: Chest: Mediastinum: Cardiomegaly. Coronary artery calcifications. Pacemaker wires noted. No significant lymphadenopathy. Large sliding hiatal hernia. Lungs/pleura: Respiratory motion artifacts limit evaluation. This subsegmental atelectasis/scarring at the lung bases. No pneumothorax. No pleural effusion. Calcified left upper lobe nodule. No pneumonia. Soft Tissues/Bones: No acute abnormality of the bones. The superficial soft tissues show no significant abnormalities. Abdomen/Pelvis: Organs: Cholelithiasis. Hepatic and splenic granulomatous calcifications. Pancreas, adrenal glands and kidneys show no significant abnormalities. Probable tiny calculus upper pole right kidney. GI/Bowel:  There is limited evaluation due to absence of oral contrast.  Large sliding hiatal hernia otherwise stomach unremarkable. No evidence for bowel obstruction. Appendix normal.  Sigmoid diverticulosis. Pelvis: Hysterectomy. Urinary bladder is unremarkable. Peritoneum/Retroperitoneum: No free fluid. No lymphadenopathy. Atherosclerotic disease. Bones/Soft Tissues: Degenerative changes. L2 through L5 laminectomy. No acute abnormality of the bones. The superficial soft tissues show no significant abnormalities. THORACIC/LUMBAR SPINE: BONES/ALIGNMENT: There is no evidence of an acute thoracic or lumbar spine fracture. There is old right L1 and L2 transverse process fracture. There is normal alignment of the thoracic and lumbar spine. L2-5 laminectomy. DEGENERATIVE CHANGES: Moderate degenerative changes. SOFT TISSUES: There is no prevertebral soft tissue swelling. CHEST ABDOMEN PELVIS 1. No acute visceral injury. 2. Cholelithiasis. 3. Large sliding hiatal hernia. THORACIC AND LUMBAR SPINE: 1. No acute fracture. 2. Moderate degenerative changes. 3. Multilevel lumbar laminectomies. CT LUMBAR SPINE TRAUMA RECONSTRUCTION    Result Date: 1/30/2022  EXAMINATION: CT OF THE CHEST, ABDOMEN, AND PELVIS WITHOUT CONTRAST; CT OF THE THORACIC SPINE WITHOUT CONTRAST; CT OF THE LUMBAR SPINE WITHOUT CONTRAST 1/30/2022 11:43 am TECHNIQUE: CT of the chest, abdomen and pelvis was performed without the administration of intravenous contrast. Multiplanar reformatted images are provided for review. Dose modulation, iterative reconstruction, and/or weight based adjustment of the mA/kV was utilized to reduce the radiation dose to as low as reasonably achievable.; CT of the thoracic spine was performed without the administration of intravenous contrast. Multiplanar reformatted images are provided for review.  Dose modulation, iterative reconstruction, and/or weight based adjustment of the mA/kV was utilized to reduce the radiation dose to as low as reasonably achievable.; CT of the lumbar spine was performed without the administration of intravenous contrast. Multiplanar reformatted images are provided for review. Adjustment of mA and/or kV according to patient size was utilized. Dose modulation, iterative reconstruction, and/or weight based adjustment of the mA/kV was utilized to reduce the radiation dose to as low as reasonably achievable. COMPARISON: None HISTORY: ORDERING SYSTEM PROVIDED HISTORY: Trauma TECHNOLOGIST PROVIDED HISTORY: Trauma Reason for Exam: trauma FINDINGS: Chest: Mediastinum: Cardiomegaly. Coronary artery calcifications. Pacemaker wires noted. No significant lymphadenopathy. Large sliding hiatal hernia. Lungs/pleura: Respiratory motion artifacts limit evaluation. This subsegmental atelectasis/scarring at the lung bases. No pneumothorax. No pleural effusion. Calcified left upper lobe nodule. No pneumonia. Soft Tissues/Bones: No acute abnormality of the bones. The superficial soft tissues show no significant abnormalities. Abdomen/Pelvis: Organs: Cholelithiasis. Hepatic and splenic granulomatous calcifications. Pancreas, adrenal glands and kidneys show no significant abnormalities. Probable tiny calculus upper pole right kidney. GI/Bowel: There is limited evaluation due to absence of oral contrast.  Large sliding hiatal hernia otherwise stomach unremarkable. No evidence for bowel obstruction. Appendix normal.  Sigmoid diverticulosis. Pelvis: Hysterectomy. Urinary bladder is unremarkable. Peritoneum/Retroperitoneum: No free fluid. No lymphadenopathy. Atherosclerotic disease. Bones/Soft Tissues: Degenerative changes. L2 through L5 laminectomy. No acute abnormality of the bones. The superficial soft tissues show no significant abnormalities. THORACIC/LUMBAR SPINE: BONES/ALIGNMENT: There is no evidence of an acute thoracic or lumbar spine fracture. There is old right L1 and L2 transverse process fracture.   There is normal alignment of the thoracic and lumbar spine. L2-5 laminectomy. DEGENERATIVE CHANGES: Moderate degenerative changes. SOFT TISSUES: There is no prevertebral soft tissue swelling. CHEST ABDOMEN PELVIS 1. No acute visceral injury. 2. Cholelithiasis. 3. Large sliding hiatal hernia. THORACIC AND LUMBAR SPINE: 1. No acute fracture. 2. Moderate degenerative changes. 3. Multilevel lumbar laminectomies. CT THORACIC SPINE TRAUMA RECONSTRUCTION    Result Date: 1/30/2022  EXAMINATION: CT OF THE CHEST, ABDOMEN, AND PELVIS WITHOUT CONTRAST; CT OF THE THORACIC SPINE WITHOUT CONTRAST; CT OF THE LUMBAR SPINE WITHOUT CONTRAST 1/30/2022 11:43 am TECHNIQUE: CT of the chest, abdomen and pelvis was performed without the administration of intravenous contrast. Multiplanar reformatted images are provided for review. Dose modulation, iterative reconstruction, and/or weight based adjustment of the mA/kV was utilized to reduce the radiation dose to as low as reasonably achievable.; CT of the thoracic spine was performed without the administration of intravenous contrast. Multiplanar reformatted images are provided for review. Dose modulation, iterative reconstruction, and/or weight based adjustment of the mA/kV was utilized to reduce the radiation dose to as low as reasonably achievable.; CT of the lumbar spine was performed without the administration of intravenous contrast. Multiplanar reformatted images are provided for review. Adjustment of mA and/or kV according to patient size was utilized. Dose modulation, iterative reconstruction, and/or weight based adjustment of the mA/kV was utilized to reduce the radiation dose to as low as reasonably achievable. COMPARISON: None HISTORY: ORDERING SYSTEM PROVIDED HISTORY: Trauma TECHNOLOGIST PROVIDED HISTORY: Trauma Reason for Exam: trauma FINDINGS: Chest: Mediastinum: Cardiomegaly. Coronary artery calcifications. Pacemaker wires noted. No significant lymphadenopathy.   Large sliding hiatal hernia. Lungs/pleura: Respiratory motion artifacts limit evaluation. This subsegmental atelectasis/scarring at the lung bases. No pneumothorax. No pleural effusion. Calcified left upper lobe nodule. No pneumonia. Soft Tissues/Bones: No acute abnormality of the bones. The superficial soft tissues show no significant abnormalities. Abdomen/Pelvis: Organs: Cholelithiasis. Hepatic and splenic granulomatous calcifications. Pancreas, adrenal glands and kidneys show no significant abnormalities. Probable tiny calculus upper pole right kidney. GI/Bowel: There is limited evaluation due to absence of oral contrast.  Large sliding hiatal hernia otherwise stomach unremarkable. No evidence for bowel obstruction. Appendix normal.  Sigmoid diverticulosis. Pelvis: Hysterectomy. Urinary bladder is unremarkable. Peritoneum/Retroperitoneum: No free fluid. No lymphadenopathy. Atherosclerotic disease. Bones/Soft Tissues: Degenerative changes. L2 through L5 laminectomy. No acute abnormality of the bones. The superficial soft tissues show no significant abnormalities. THORACIC/LUMBAR SPINE: BONES/ALIGNMENT: There is no evidence of an acute thoracic or lumbar spine fracture. There is old right L1 and L2 transverse process fracture. There is normal alignment of the thoracic and lumbar spine. L2-5 laminectomy. DEGENERATIVE CHANGES: Moderate degenerative changes. SOFT TISSUES: There is no prevertebral soft tissue swelling. CHEST ABDOMEN PELVIS 1. No acute visceral injury. 2. Cholelithiasis. 3. Large sliding hiatal hernia. THORACIC AND LUMBAR SPINE: 1. No acute fracture. 2. Moderate degenerative changes. 3. Multilevel lumbar laminectomies.        PHYSICAL EXAM:   GCS:  4 - Opens eyes on own   6 - Follows simple motor commands  5 - Alert and oriented    Pupil size:  Left 3 mm Right 3 mm  Pupil reaction: Yes  Wiggles fingers: Left Yes Right Yes  Hand grasp:   Left normal   Right normal  Wiggles toes: Left Yes    Right Yes  Plantar flexion: Left normal  Right normal    BP (!) 135/94   Pulse 85   Temp 98.6 °F (37 °C) (Oral)   Resp 15   Ht 5' 9\" (1.753 m)   Wt 260 lb (117.9 kg)   SpO2 92%   BMI 38.40 kg/m²   General appearance: alert, appears stated age and cooperative  Head: Normocephalic, without obvious abnormality, atraumatic  Eyes: conjunctivae/corneas clear. PERRL, EOM's intact. Fundi benign. Ears: normal TM's and external ear canals both ears  Nose: Nares normal. Septum midline. Mucosa normal. No drainage or sinus tenderness. Throat: lips, mucosa, and tongue normal; teeth and gums normal  Neck: no JVD and supple, symmetrical, trachea midline  Back: negative  Lungs: clear to auscultation bilaterally  Breasts: normal appearance, no masses or tenderness  Heart: regular rate and rhythm, S1, S2 normal, no murmur, click, rub or gallop  Abdomen: soft, non-tender; bowel sounds normal; no masses,  no organomegaly   Extremities: upper extremities normal, atraumatic, no cyanosis or edema.  Lower extremities splinted and wrapped by ortho  Pulses: 2+ and symmetric  Skin: Skin color, texture, turgor normal. No rashes or lesions  Neurologic: Grossly normal    Spine:     Spine Tenderness ROM   Cervical 0 /10 Normal   Thoracic 0 /10 Normal   Lumbar 0 /10 Normal     Musculoskeletal    Joint Tenderness Swelling ROM   Right shoulder present absent normal   Left shoulder absent absent normal   Right elbow absent absent normal   Left elbow absent absent normal   Right wrist absent absent normal   Left wrist absent absent normal   Right hand grasp absent absent normal   Left hand grasp absent absent normal   Right hip absent absent normal   Left hip absent absent normal   Right knee absent absent normal   Left knee absent absent normal   Right ankle present absent abnormal - splinted and wrapped   Left ankle present absent abnormal - splinted and wrapped   Right foot absent absent abnormal - splinted and wrapped   Left foot absent absent abnormal - splinted and wrapped           CONSULTS: Orthopedic surgery    PROCEDURES: none    INJURIES:    Bilateral open tib/fib fracture    Patient Active Problem List   Diagnosis    Fall at home, initial encounter         Assessment/Plan:     N: MMPT: Tylenol, Anay, Robaxin, Gabapentin  C: HDS. P: Encourage IS, deep breathing, and cough. FEN: Fluids: LR @ 125. Replete lytes PRN. BUN 35, Creat 1.45  GI: Diet: NPO  R: Monitor I/O's.  H: VTE ppx: hold. Hgb 12.2  I: Afebrile. Abx: Ancef preop. WBC 12.6  E: Glucose wnl. M: OR in the AM with Ortho for bilateral I&D of open fractures with hardware removal of right ankle with bilateral tibial IMN versus ORIF versus external fixator device application   Dispo: Continue treatment.  OR in the AM with Ortho

## 2022-01-31 NOTE — PROGRESS NOTES
Occup[facility] [address]  Occupational Therapy Not Seen Note    DATE: 2022    NAME: Migel Salgado  MRN: [id number]   : 1933      Patient not seen this date for Occupational Therapy due to:    Surgery/Procedure: pt currently in OR for bilateral lower extremity irrigation & debridement, and open reduction internal fixation/external fixator application.     Next Scheduled Treatment: 2022    Electronically signed by PHILIPP Newton on 2022 at 3:18 PM

## 2022-01-31 NOTE — CONSULTS
flush 0.9 % injection 5-40 mL, 5-40 mL, IntraVENous, 2 times per day  sodium chloride flush 0.9 % injection 5-40 mL, 5-40 mL, IntraVENous, PRN  0.9 % sodium chloride infusion, 25 mL, IntraVENous, PRN  polyethylene glycol (GLYCOLAX) packet 17 g, 17 g, Oral, Daily  lactated ringers infusion, , IntraVENous, Continuous  ceFAZolin (ANCEF) 2000 mg in dextrose 5 % 50 mL IVPB, 2,000 mg, IntraVENous, Q8H  acetaminophen (TYLENOL) tablet 1,000 mg, 1,000 mg, Oral, 3 times per day  methocarbamol (ROBAXIN) tablet 750 mg, 750 mg, Oral, Q8H  gabapentin (NEURONTIN) tablet 300 mg, 300 mg, Oral, TID  oxyCODONE (ROXICODONE) immediate release tablet 5 mg, 5 mg, Oral, Q6H PRN  ondansetron (ZOFRAN) injection 4 mg, 4 mg, IntraVENous, Q6H PRN    Allergies:  Adhesive tape and Lisinopril    Social History:   reports that she has never smoked. She has never used smokeless tobacco. She reports previous alcohol use. She reports previous drug use. Family History: family history is not on file. No h/o sudden cardiac death. No for premature CAD    REVIEW OF SYSTEMS:    · Constitutional: there has been no unanticipated weight loss. There's been No change in energy level, No change in activity level. · Eyes: No visual changes or diplopia. No scleral icterus. · ENT: No Headaches  · Cardiovascular: As mentioned in H&p  · Respiratory: No previous pulmonary problems, No cough  · Gastrointestinal: No abdominal pain. No change in bowel or bladder habits. · Genitourinary: No dysuria, trouble voiding, or hematuria. · Musculoskeletal:  No gait disturbance, No weakness or joint complaints. · Integumentary: No rash or pruritis. PHYSICAL EXAM:      /70   Pulse 105   Temp 98.4 °F (36.9 °C) (Oral)   Resp 15   Ht 5' 9\" (1.753 m)   Wt 260 lb (117.9 kg)   SpO2 92%   BMI 38.40 kg/m²    Constitutional and General Appearance: alert, cooperative, no distress and appears stated age  HEENT: PERRL, no cervical lymphadenopathy.  No masses palpable. Normal oral mucosa  Respiratory:  · Normal excursion and expansion without use of accessory muscles  · Resp Auscultation: Good respiratory effort. No for increased work of breathing. On auscultation: clear to auscultation bilaterally  Cardiovascular:  · The apical impulse is not displaced  · Heart tones are crisp and normal. regular S1 and S2.  · Jugular venous pulsation Normal  · The carotid upstroke is normal in amplitude and contour without delay or bruit  · Peripheral pulses are symmetrical and full   Abdomen:   · No masses or tenderness  · Bowel sounds present  ·   Neurological:  · Alert and oriented. · Moves all extremities well  · No abnormalities of mood, affect, memory, mentation, or behavior are noted    Labs:     CBC:   Recent Labs     01/30/22  1201 01/31/22  0556   WBC 12.6* 7.6   HGB 12.2 10.3*   HCT 36.8 32.4*    135*     BMP:   Recent Labs     01/30/22  1201 01/31/22  0556    133*   K 4.5 3.8   CO2 28 27   BUN 35* 36*   CREATININE 1.45* 1.70*   LABGLOM NOT REPORTED 28*   GLUCOSE 114* 118*     BNP: No results for input(s): BNP in the last 72 hours. PT/INR:   Recent Labs     01/30/22  1201   PROTIME 10.6   INR 1.0     APTT:  Recent Labs     01/30/22  1201   APTT 16.9*     CARDIAC ENZYMES:No results for input(s): CKTOTAL, CKMB, CKMBINDEX, TROPONINI in the last 72 hours. FASTING LIPID PANEL:No results found for: HDL, LDLDIRECT, LDLCALC, TRIG  LIVER PROFILE:No results for input(s): AST, ALT, LABALBU in the last 72 hours. IMPRESSION:    Patient Active Problem List   Diagnosis    Fall at home, initial encounter       1. Preoperative recertification for bilateral tibial fracture repair. 2. Atrial fibrillation   3  H/o PPM.    RECOMMENDATIONS:  1. EKG reviewed. atrial fibrillation. Rate controlled. Patient is currently not on anticoagulation due to upcoming surgery. Has h/o Afib and not on any AC at home.  Follows up with         Discussed with patient and Nurse.    Electronically signed by Valente Rosado MD on 1/31/2022 at 7:33 [address] [place] [address] [facility]      [phone]

## 2022-01-31 NOTE — ANESTHESIA PRE PROCEDURE
Department of Anesthesiology  Preprocedure Note       Name:  Amanda Sims   Age:  80 y.o.  :  1933                                          MRN:  [id number]         Date:  2022      Surgeon: Mike Collins):  Maria G Farmer DO    Procedure: Procedure(s):  IRRIGATION AND DEBRIDEMENT BILATERAL LOWER EXTREMTIES, LEFT TIBIA IMN, RIGHT TIBIA IMN VS ORIF WITH HARDWARE REMOVAL RIGHT ANKLE SUPINE ON 3080 W/EXTENSION, CYSTO TUBING,  TRAUMA TOOLBOX, C-ARM,  PT NEEDS CARDIAC CLEARANCE    Department of Anesthesiology  Pre-Anesthesia Evaluation/Consultation         Name:  Amanda Sims                                         Age:  80 y.o.   MRN:  [id number]             Medications  Current Facility-Administered Medications   Medication Dose Route Frequency Provider Last Rate Last Admin    ceFAZolin (ANCEF) 2000 mg in dextrose 5 % 50 mL IVPB  2,000 mg IntraVENous Q12H , DO        iopamidol (ISOVUE-370) 76 % injection 130 mL  130 mL IntraVENous ONCE PRN Tala Mess, DO        sodium chloride flush 0.9 % injection 5-40 mL  5-40 mL IntraVENous 2 times per day Tala Mess, DO   10 mL at 22 2675    sodium chloride flush 0.9 % injection 5-40 mL  5-40 mL IntraVENous PRN Tala Mess, DO        0.9 % sodium chloride infusion  25 mL IntraVENous PRN Tala Mess, DO        polyethylene glycol (GLYCOLAX) packet 17 g  17 g Oral Daily Tala Mess, DO   17 g at 22 7485    lactated ringers infusion   IntraVENous Continuous Tala Mess,  mL/hr at 22 0010 New Bag at 22 0010    acetaminophen (TYLENOL) tablet 1,000 mg  1,000 mg Oral 3 times per day Tala Mess, DO   1,000 mg at 22 0432    methocarbamol (ROBAXIN) tablet 750 mg  750 mg Oral Q8H Tala Mess, DO   750 mg at 22 9151    gabapentin (NEURONTIN) tablet 300 mg  300 mg Oral TID Tala Mess, DO   300 mg at 22 2476    oxyCODONE (ROXICODONE) immediate release tablet 5 mg  5 mg Oral Q6H PRN Tala Mess, DO   5 mg at 22 1046    ondansetron (ZOFRAN) injection 4 mg  4 mg IntraVENous Q6H PRN Devorah Suarez DO           Allergies   Allergen Reactions    Adhesive Tape     Lisinopril      Patient Active Problem List   Diagnosis    Fall at home, initial encounter     History reviewed. No pertinent past medical history. History reviewed. No pertinent surgical history. Social History     Tobacco Use    Smoking status: Never Smoker    Smokeless tobacco: Never Used   Vaping Use    Vaping Use: Unknown   Substance Use Topics    Alcohol use: Not Currently    Drug use: Not Currently         Vital Signs (Current)   Vitals:    22 0800   BP: (!) 127/90   Pulse: 102   Resp: 14   Temp: 100 °F (37.8 °C)   SpO2: 94%     Vital Signs Statistics (for past 48 hrs)     Temp  Av.2 °F (36.8 °C)  Min: 97.5 °F (36.4 °C)   Min taken time: 22 1500  Max: 100 °F (37.8 °C)   Max taken time: 22 0800  Pulse  Av.7  Min: 80   Min taken time: 22 2100  Max: 109   Max taken time: 22 1500  Resp  Av.2  Min: 11   Min taken time: 22 1338  Max: 23   Max taken time: 22 1145  BP  Min: 96/69   Min taken time: 22 1500  Max: 159/141   Max taken time: 22 1145  MAP (mmHg)  Av.6  Min: 68   Min taken time: 22 1500  Max: 131   Max taken time: 22 1145  SpO2  Av.1 %  Min: 91 %   Min taken time: 22 1145  Max: 100 %   Max taken time: 22 1200  BP Readings from Last 3 Encounters:   22 (!) 127/90       BMI  Body mass index is 38.4 kg/m².     CBC   Lab Results   Component Value Date    WBC 7.6 2022    RBC 3.37 2022    HGB 10.3 2022    HCT 32.4 2022    MCV 96.1 2022    RDW 15.7 2022     2022       CMP    Lab Results   Component Value Date     2022    K 3.8 2022    CL 96 2022    CO2 27 2022    BUN 36 2022    CREATININE 1.70 2022    GFRAA 34 2022    LABGLOM 28 2022    GLUCOSE 118 01/31/2022    CALCIUM 9.3 01/31/2022       BMP    Lab Results   Component Value Date     01/31/2022    K 3.8 01/31/2022    CL 96 01/31/2022    CO2 27 01/31/2022    BUN 36 01/31/2022    CREATININE 1.70 01/31/2022    CALCIUM 9.3 01/31/2022    GFRAA 34 01/31/2022    LABGLOM 28 01/31/2022    GLUCOSE 118 01/31/2022       POC Testing  No results for input(s): POCGLU, POCNA, POCK, POCCL, POCBUN, POCHEMO, POCHCT in the last 72 hours. Coags    Lab Results   Component Value Date    PROTIME 10.6 01/30/2022    INR 1.0 01/30/2022    APTT 16.9 01/30/2022       HCG (If Applicable) No results found for: PREGTESTUR, PREGSERUM, HCG, HCGQUANT     ABGs No results found for: PHART, PO2ART, KDJ9SKI, YAV8PZG, BEART, I6XBFSFW     Type & Screen (If Applicable)  No results found for: LABABO, [address]    Radiology (If Applicable)    Cardiac Testing (If Applicable) high risk    EKG (If Applicable) septal MI,AF          Medications prior to admission:   Prior to Admission medications    Not on File       Current medications:    No current facility-administered medications for this visit. No current outpatient medications on file.      Facility-Administered Medications Ordered in Other Visits   Medication Dose Route Frequency Provider Last Rate Last Admin    ceFAZolin (ANCEF) 2000 mg in dextrose 5 % 50 mL IVPB  2,000 mg IntraVENous Q12H Ana Cristina Stanton, DO        iopamidol (ISOVUE-370) 76 % injection 130 mL  130 mL IntraVENous ONCE PRN Gaylavonne Karin, DO        sodium chloride flush 0.9 % injection 5-40 mL  5-40 mL IntraVENous 2 times per day Yukelbye Karin, DO   10 mL at 01/31/22 3574    sodium chloride flush 0.9 % injection 5-40 mL  5-40 mL IntraVENous PRN Jone Karin, DO        0.9 % sodium chloride infusion  25 mL IntraVENous PRN Yuvonne Blakes, DO        polyethylene glycol (GLYCOLAX) packet 17 g  17 g Oral Daily Yuvonne Blakes, DO   17 g at 01/31/22 8042    lactated ringers infusion   IntraVENous Continuous iBll Frazier  mL/hr at 01/31/22 0010 New Bag at 01/31/22 0010    acetaminophen (TYLENOL) tablet 1,000 mg  1,000 mg Oral 3 times per day Evalee Drown, DO   1,000 mg at 01/31/22 0432    methocarbamol (ROBAXIN) tablet 750 mg  750 mg Oral Q8H Evalee Drown, DO   750 mg at 01/31/22 0432    gabapentin (NEURONTIN) tablet 300 mg  300 mg Oral TID Evalee Drown, DO   300 mg at 01/31/22 1291    oxyCODONE (ROXICODONE) immediate release tablet 5 mg  5 mg Oral Q6H PRN Evalee Drown, DO   5 mg at 01/31/22 1046    ondansetron (ZOFRAN) injection 4 mg  4 mg IntraVENous Q6H PRN Evalee Drown, DO           Allergies: Allergies   Allergen Reactions    Adhesive Tape     Lisinopril        Problem List:    Patient Active Problem List   Diagnosis Code    Fall at home, initial encounter W19. Josie Abreu, Y92.009       Past Medical History:  No past medical history on file. Past Surgical History:  No past surgical history on file. Social History:    Social History     Tobacco Use    Smoking status: Never Smoker    Smokeless tobacco: Never Used   Substance Use Topics    Alcohol use: Not Currently                                Counseling given: Not Answered      Vital Signs (Current): There were no vitals filed for this visit.                                            BP Readings from Last 3 Encounters:   01/31/22 (!) 127/90       NPO Status:                                                                                 BMI:   Wt Readings from Last 3 Encounters:   01/30/22 260 lb (117.9 kg)     There is no height or weight on file to calculate BMI.    CBC:   Lab Results   Component Value Date    WBC 7.6 01/31/2022    RBC 3.37 01/31/2022    HGB 10.3 01/31/2022    HCT 32.4 01/31/2022    MCV 96.1 01/31/2022    RDW 15.7 01/31/2022     01/31/2022       CMP:   Lab Results   Component Value Date     01/31/2022    K 3.8 01/31/2022    CL 96 01/31/2022    CO2 27 01/31/2022    BUN 36 01/31/2022    CREATININE 1.70 01/31/2022    GFRAA 34 01/31/2022    LABGLOM 28 2022    GLUCOSE 118 2022    CALCIUM 9.3 2022       POC Tests: No results for input(s): POCGLU, POCNA, POCK, POCCL, POCBUN, POCHEMO, POCHCT in the last 72 hours.     Coags:   Lab Results   Component Value Date    PROTIME 10.6 2022    INR 1.0 2022    APTT 16.9 2022       HCG (If Applicable): No results found for: PREGTESTUR, PREGSERUM, HCG, HCGQUANT     ABGs: No results found for: PHART, PO2ART, ULJ9DCG, SIF4HDK, BEART, X5SHZAOE     Type & Screen (If Applicable):  No results found for: LABABO, LABRH    Drug/Infectious Status (If Applicable):  No results found for: HIV, HEPCAB    COVID-19 Screening (If Applicable):   Lab Results   Component Value Date    COVID19 Not Detected 2022           Anesthesia Evaluation     Anesthesia Plan        Reynaldo Pedraza MD   2022        Department of Anesthesiology  Preprocedure Note       Name:  Krista Milton   Age:  80 y.o.  :  1933                                          MRN:  [id number]         Date:  2022      Surgeon: Corbin Macias):  Kathleen Li DO    Procedure: Procedure(s):  IRRIGATION AND DEBRIDEMENT BILATERAL LOWER EXTREMTIES, LEFT TIBIA IMN, RIGHT TIBIA IMN VS ORIF WITH HARDWARE REMOVAL RIGHT ANKLE SUPINE ON 3080 W/EXTENSION, CYSTO TUBING,  TRAUMA TOOLBOX, C-ARM,  PT NEEDS CARDIAC CLEARANCE    Medications prior to admission:   Prior to Admission medications    Not on File       Current medications:    Current Facility-Administered Medications   Medication Dose Route Frequency Provider Last Rate Last Admin    ceFAZolin (ANCEF) 2000 mg in dextrose 5 % 50 mL IVPB  2,000 mg IntraVENous Q12H Bowen Goodie Stanton, DO        iopamidol (ISOVUE-370) 76 % injection 130 mL  130 mL IntraVENous ONCE PRN Tonna Rattler, DO        sodium chloride flush 0.9 % injection 5-40 mL  5-40 mL IntraVENous 2 times per day Tonna Rattler, DO   10 mL at 22 0923    sodium chloride flush 0.9 % injection 5-40 mL  5-40 mL IntraVENous PRN Brain Sayer, DO        0.9 % sodium chloride infusion  25 mL IntraVENous PRN Brain Sayer, DO        polyethylene glycol (GLYCOLAX) packet 17 g  17 g Oral Daily Brain Sayer, DO   17 g at 01/31/22 7190    lactated ringers infusion   IntraVENous Continuous Brain Sayer,  mL/hr at 01/31/22 0010 New Bag at 01/31/22 0010    acetaminophen (TYLENOL) tablet 1,000 mg  1,000 mg Oral 3 times per day Brain Sayer, DO   1,000 mg at 01/31/22 0432    methocarbamol (ROBAXIN) tablet 750 mg  750 mg Oral Q8H Brain Sayer, DO   750 mg at 01/31/22 0432    gabapentin (NEURONTIN) tablet 300 mg  300 mg Oral TID Brain Sayer, DO   300 mg at 01/31/22 0594    oxyCODONE (ROXICODONE) immediate release tablet 5 mg  5 mg Oral Q6H PRN Brain Sayer, DO   5 mg at 01/31/22 0431    ondansetron (ZOFRAN) injection 4 mg  4 mg IntraVENous Q6H PRN Brain Sayer, DO           Allergies: Allergies   Allergen Reactions    Adhesive Tape     Lisinopril        Problem List:    Patient Active Problem List   Diagnosis Code    Fall at home, initial encounter W19. Shay Ch, Y92.009       Past Medical History:  History reviewed. No pertinent past medical history. Past Surgical History:  History reviewed. No pertinent surgical history.     Social History:    Social History     Tobacco Use    Smoking status: Never Smoker    Smokeless tobacco: Never Used   Substance Use Topics    Alcohol use: Not Currently                                Counseling given: No      Vital Signs (Current):   Vitals:    01/30/22 1745 01/30/22 2100 01/31/22 0436 01/31/22 0800   BP: 128/67 (!) 135/94 104/70 (!) 127/90   Pulse: 85 85 105 102   Resp: 16 15 15 14   Temp: 98.2 °F (36.8 °C) 98.6 °F (37 °C) 98.4 °F (36.9 °C) 100 °F (37.8 °C)   TempSrc: Oral Oral Oral Temporal   SpO2:    94%   Weight:       Height:                                                  BP Readings from Last 3 Encounters:   01/31/22 (!) 127/90       NPO Status:  MN BMI:   Wt Readings from Last 3 Encounters:   01/30/22 260 lb (117.9 kg)     Body mass index is 38.4 kg/m². CBC:   Lab Results   Component Value Date    WBC 7.6 01/31/2022    RBC 3.37 01/31/2022    HGB 10.3 01/31/2022    HCT 32.4 01/31/2022    MCV 96.1 01/31/2022    RDW 15.7 01/31/2022     01/31/2022       CMP:   Lab Results   Component Value Date     01/31/2022    K 3.8 01/31/2022    CL 96 01/31/2022    CO2 27 01/31/2022    BUN 36 01/31/2022    CREATININE 1.70 01/31/2022    GFRAA 34 01/31/2022    LABGLOM 28 01/31/2022    GLUCOSE 118 01/31/2022    CALCIUM 9.3 01/31/2022       POC Tests: No results for input(s): POCGLU, POCNA, POCK, POCCL, POCBUN, POCHEMO, POCHCT in the last 72 hours.     Coags:   Lab Results   Component Value Date    PROTIME 10.6 01/30/2022    INR 1.0 01/30/2022    APTT 16.9 01/30/2022       HCG (If Applicable): No results found for: PREGTESTUR, PREGSERUM, HCG, HCGQUANT     ABGs: No results found for: PHART, PO2ART, LHO7FZJ, AXO8MQB, BEART, K0NYQYHP     Type & Screen (If Applicable):  No results found for: LABABO, LABRH    Drug/Infectious Status (If Applicable):  No results found for: HIV, HEPCAB    COVID-19 Screening (If Applicable):   Lab Results   Component Value Date    COVID19 Not Detected 01/30/2022           Anesthesia Evaluation   no history of anesthetic complications:   Airway: Mallampati: III     Neck ROM: limited   Dental:    (+) upper dentures      Pulmonary:   (+) sleep apnea: on CPAP,      (-) not a current smoker                           Cardiovascular:  Exercise tolerance: poor (<4 METS),   (+) hypertension:, pacemaker: pacemaker, CAD:, dysrhythmias: atrial fibrillation, CHF:,                   Neuro/Psych:   (+) neuromuscular disease:,    (-) seizures and CVA            ROS comment: neuropathy GI/Hepatic/Renal:   (+) hiatal hernia, GERD:, renal disease: CRI, morbid obesity          Endo/Other:    (+) Diabetespoorly controlled, using insulin, .                 Abdominal:             Vascular: Other Findings:           Anesthesia Plan      general     ASA 4       Induction: intravenous.                           Laurie Falk MD   1/31/2022

## 2022-01-31 NOTE — PROGRESS NOTES
Orthopedic Progress Note    Patient:  Autumn Ramirez, 80 y.o. female  YOB: 1933       Subjective:  Patient seen and examined. No complaints or concerns. Pain controlled on current regimen. No issues overnight. Denies fever, HA, CP, SOB, N/V. NPO. Will work w/ PT following surgery. To OR today with Dr. Rafiq Thompson. Objective:   Vitals:    01/30/22 2100   BP: (!) 135/94   Pulse: 85   Resp: 15   Temp: 98.6 °F (37 °C)   SpO2:      Gen: NAD, cooperative    Cardiovascular: Regular rate    Respiratory: Symmetric chest rise. No accessory muscle use    RLE: Splint on, c/d/i. Diffusely tender to palpation with compressible compartments. Sensation and motor intact to exposed toes. Exposed digits warm and well-perfused.      LLE: Splint on and intact with saturation noted distal, posterolateral aspect of lower leg. Diffusely tender to palpation with compressible compartments. Sensation and motor intact to exposed toes. Exposed digits warm and well-perfused. Recent Labs     01/30/22  1201   WBC 12.6*   HGB 12.2   HCT 36.8      INR 1.0      K 4.5   BUN 35*   CREATININE 1.45*   GLUCOSE 114*      Abx: Ancef    Impression/Plan: 80 y.o. female who fell from standing height, being seen for:     -Bilateral open distal tibia/fibula fracture     -OR today for bilateral lower extremity irrigation & debridement, and open reduction internal fixation/external fixator application  -Awaiting cardiac clearance for surgery today  -NPO  -Splints on, please maintain and reinforce as necessary.  -Nonweightbearing bilateral lower extremity  -Recommend Ancef 2 g every 8 hours. -DVT ppx: Please hold in anticipation for surgery  -Pain control per primary team  -Ice and elevate extremity for pain and swelling  -Please contact ortho with any questions    Electronically signed by Beverly Zayas DO on 1/31/2022 at 4:37 AM.      PGY-2 Addendum    Patient seen and examined.  Agree with Dr. Leonard Dee history, physical examination, assessment and plan except where changes were made above (may be highlighted by Darletta Pock selection feature). Patient resting with pain mostly controlled, awaiting new dose of christina. Splints remain intact. Plan for OR this afternoon/morning for fixation of both lower extremities.     Gabriella Hussein MD, PGY-2  Orthopedic Surgery Resident  [facility], [facility]

## 2022-01-31 NOTE — PROGRESS NOTES
Physical Therapy        Physical Therapy Cancel Note      DATE: 2022    NAME: Rosemary Byrnes  MRN: [id number]   : 1933      Patient not seen this date for Physical Therapy due to:    OR today for bilateral lower extremity irrigation & debridement, and open reduction internal fixation/external fixator application.  Ck     Electronically signed by El Rushing PT on 2022 at 9:06 AM

## 2022-01-31 NOTE — PLAN OF CARE
PALLIATIVE CARE PLAN OF CARE    Patient: [facility]    Room: [facility] OR [facility]/NONE      The patient could not be seen as she was already taken to the OR. Will see tomorrow.         Blade Ching MD  Hospice/Palliative Care Fellow  [address], [place]  1/31/2022 2:53 PM

## 2022-01-31 NOTE — PLAN OF CARE
Problem: Pain:  Goal: Pain level will decrease  Description: Pain level will decrease  1/31/2022 1216 by Hilary Servin RN  Outcome: Ongoing  1/31/2022 0251 by Destiny Garibay RN  Outcome: Ongoing  Goal: Control of acute pain  Description: Control of acute pain  1/31/2022 1216 by Hilary Servin RN  Outcome: Ongoing  1/31/2022 0251 by Destiny Garibay RN  Outcome: Ongoing  Goal: Control of chronic pain  Description: Control of chronic pain  1/31/2022 1216 by Hilary Servin RN  Outcome: Ongoing  1/31/2022 0251 by Destiny Garibay RN  Outcome: Ongoing     Problem: Falls - Risk of:  Goal: Will remain free from falls  Description: Will remain free from falls  1/31/2022 1216 by Hilary Servin RN  Outcome: Ongoing  1/31/2022 0251 by Destiny Garibay RN  Outcome: Ongoing  Goal: Absence of physical injury  Description: Absence of physical injury  1/31/2022 1216 by Hilary Servin RN  Outcome: Ongoing  1/31/2022 0251 by Destiny Garibay RN  Outcome: Ongoing     Problem: Skin Integrity:  Goal: Will show no infection signs and symptoms  Description: Will show no infection signs and symptoms  1/31/2022 1216 by Hilary Servin RN  Outcome: Ongoing  1/31/2022 0251 by Destiny Garibay RN  Outcome: Ongoing  Goal: Absence of new skin breakdown  Description: Absence of new skin breakdown  1/31/2022 1216 by Hilary Servin RN  Outcome: Ongoing  1/31/2022 0251 by Destiny Garibay RN  Outcome: Ongoing

## 2022-01-31 NOTE — PROGRESS NOTES
Pharmacy Note     Renal Dose Adjustment    Cliff Asif is a 80 y.o. female. Pharmacist assessment of renally cleared medications. Recent Labs     01/30/22  1201 01/31/22  0556   BUN 35* 36*       Recent Labs     01/30/22  1201 01/31/22  0556   CREATININE 1.45* 1.70*       Estimated Creatinine Clearance: 31 mL/min (A) (based on SCr of 1.7 mg/dL (H)). Estimated CrCl using Ideal Body Weight:  23 mL/min (based on IBW 66  kg)    Height:   Ht Readings from Last 1 Encounters:   01/30/22 5' 9\" (1.753 m)     Weight:  Wt Readings from Last 1 Encounters:   01/30/22 260 lb (117.9 kg)       The following medication dose has been adjusted based upon renal function per P&T Guidelines:             Dose adjusted to 2000 mg every 12 hours down from 8 hours.    Indication: Surgical prophylaxis

## 2022-01-31 NOTE — PROGRESS NOTES
Speech Language Pathology  Facility/Department: [facility] OR  Initial Speech/Language/Cognitive Assessment    NAME: Kirby Vaca  : 1933   MRN: [id number]  ADMISSION DATE: 2022  ADMITTING DIAGNOSIS: has Fall at home, initial encounter on their problem list.    Date of Eval: 2022   Evaluating Therapist: Brianda Sutton    Primary Complaint: Bx Kelsey Valdovinos is a 81 yo female that presented to the Emergency Department following fall from [facility]. Patient was getting out of bed when her daughter heard a thud and screaming, found the patient on the ground with open bilateral tib/fib fractures. No AC, on ASA. No LOC. Patient has stuttering speech and is difficult to understand. Says she was not pushed or abused. Has tremors, halting speech at baseline. Pain:  Pain Assessment  Pain Assessment: Off Unit  Pain Level: 10  Pain Type: Acute pain  Pain Location: Leg  Pain Orientation: Right,Left  Pain Descriptors: Aching  Pain Frequency: Continuous  Pain Onset: On-going  Non-Pharmaceutical Pain Intervention(s): Repositioned,Rest  Response to Pain Intervention: None    Assessment:   Pt presents with mild-moderate cognitive deficits characterized by difficulties with short-term memory, verbal reasoning, and problem-solving. ST to follow up and provide treatment to address noted deficits. Education provided. Recommendations:  Requires SLP Intervention: Yes  Duration/Frequency of Treatment: 3-5x per week  D/C Recommendations: Further therapy recommended at discharge. Plan:   Goals:  Short-term Goals  Goal 1: Pt will complete word associations with 90% accuracy. Goal 2: Pt will recall 3-5 units with and without distractions with 90% accuracy. Goal 3: Pt will utilize memory compensatory strategies to aid in recall. Goal 4: Pt will complete abstract reasoning/deductive reasoning tasks with 90% accuracy.    Patient/family involved in developing goals and treatment plan: Yes    Subjective: General  Chart Reviewed: Yes  Patient assessed for rehabilitation services?: Yes  Family / Caregiver Present: No  Social/Functional History  Active : No  Vision  Vision: Within Functional Limits  Hearing  Hearing: Within functional limits           Objective:     Oral/Motor  Oral Motor: Within functional limits              Expression  Primary Mode of Expression: Verbal              Motor Speech  Motor Speech: Within Functional Limits    Pragmatics/Social Functioning  Pragmatics: Within functional limits    Cognition:      Orientation  Overall Orientation Status: Within Normal Limits  Attention  Attention: Within Functional Limits  Memory  Memory: Exceptions to [facility]  Short-term Memory: Moderate (1/3)  Problem Solving  Problem Solving: Exceptions to [facility]  Verbal Reasoning Skills: Moderate (1/3)  Abstract Reasoning  Abstract Reasoning: Exceptions to [facility]  Divergent Thinking: Mild (+6)  Safety/Judgement  Safety/Judgement: Within Functional Limits   Word Associations: Moderate (1/3)             Prognosis:  Speech Therapy Prognosis  Prognosis: Good  Individuals consulted  Consulted and agree with results and recommendations: Patient    Education:  Patient Education: Yes  Patient Education Response: Verbalizes understanding          Therapy Time:   Individual Concurrent Group Co-treatment   Time In 9168         Time Out 0933         Minutes 11                 Completed by Brent Shah,  Clinician  Co-signed by Martha Garnica A.CCC/SLP    1/31/2022 12:41 PM

## 2022-01-31 NOTE — PROGRESS NOTES
Interval Progress Note    81 yo female admitted on 1/30 s/p fall from bed and bilateral fibula/tibia fractures     Hospital course is as follows:  1/31: OR with ortho for I&D / ORIF vs IMN bilateral lower extremities    MEDICAL DECISION MAKING AND PLAN    CV/Heme    Post-op: estimated blood loss: 250  o F/u post op Hgb/Hct    Meds: will need to reconcile home medications   Inpatient consult to cardiology:  o Hx of Afib but does not take anticoagulation at home  o No recommendations  Pulm   Intubated: settings TBD  o Remaining intubated post-op due to age, hx of sleep apnea and use of CPAP at home  Renal  · Fluids at 125 mL/hr  · UOP: 240 out in OR  GI/Nutrition   Diet: NPO   Pepcid for stress prophylaxis  Micro   Afebrile   WBC:   Neuro   MMT for pain: Gabapentin (home med), Anay, Tylenol   Percocet in PACU  Endocrine   Monitor blood glucose  Prophylaxis   DVT: Held, will start POD 1   GI: Pepcid  Lines   PIV x 2, soria catheter, ETT   Fam/Dispo   Will call family to discuss code status     Mart Forward, DO PGY-1  1/31/22 4:44 PM

## 2022-01-31 NOTE — CARE COORDINATION
SBIRT- Completed and screenings were negative          Alcohol Screening and Brief Intervention        Recent Labs     01/30/22  1201   ALC <10       Alcohol Pre-screening     (WOMEN ONLY) How many times in the past year have you had 4 or more drinks in a day?: None    Alcohol Screening Audit       Drug Pre-Screening   How many times in the past year have you used a recreational drug or used a prescription medication for nonmedical reasons?: None    Drug Screening DAST       Mood Pre-Screening (PHQ-2)  During the past two weeks, have you been bothered by little interest or pleasure in doing things?: No  During the past two weeks, have you been bothered by feeling down, depressed, or hopeless?: No    Mood Pre-Screening (PHQ-9)         I have interviewed Magno Soriano, [id number] regarding  Her alcohol consumption/drug use and risk for excessive use. Screenings were negative. Patient  N/A intervention at this time.      Deferred []    Completed on: 1/31/2022   [address] [facility]

## 2022-01-31 NOTE — PLAN OF CARE
Problem: Pain:  Goal: Pain level will decrease  Description: Pain level will decrease  1/31/2022 0251 by Yanci Hammond RN  Outcome: Ongoing  1/30/2022 1829 by Jeovany Harmon RN  Outcome: Ongoing  Goal: Control of acute pain  Description: Control of acute pain  1/31/2022 0251 by Yanci Hammond RN  Outcome: Ongoing  1/30/2022 1829 by Jeovany Harmon RN  Outcome: Ongoing  Goal: Control of chronic pain  Description: Control of chronic pain  1/31/2022 0251 by Yanci Hammond RN  Outcome: Ongoing  1/30/2022 1829 by Jeovany Harmon RN  Outcome: Ongoing     Problem: Falls - Risk of:  Goal: Will remain free from falls  Description: Will remain free from falls  1/31/2022 0251 by Yanci Hammond RN  Outcome: Ongoing  1/30/2022 1829 by Jeovany Harmon RN  Outcome: Ongoing  Goal: Absence of physical injury  Description: Absence of physical injury  1/31/2022 0251 by Yanci Hammond RN  Outcome: Ongoing  1/30/2022 1829 by Jeovany Harmon RN  Outcome: Ongoing     Problem: Skin Integrity:  Goal: Will show no infection signs and symptoms  Description: Will show no infection signs and symptoms  1/31/2022 0251 by Yanic Hammond RN  Outcome: Ongoing  1/30/2022 1829 by Jeovany Harmon RN  Outcome: Ongoing  Goal: Absence of new skin breakdown  Description: Absence of new skin breakdown  1/31/2022 0251 by Yanci Hammond RN  Outcome: Ongoing  1/30/2022 1829 by Jeovany Harmon RN  Outcome: Ongoing

## 2022-01-31 NOTE — BRIEF OP NOTE
Brief Postoperative Note      Patient: Chanel Christie  YOB: 1933  MRN: [id number]   CSN: [id number]    Date of Procedure: 1/31/2022    Pre-Op Diagnosis:   - Left open distal 1/3 tibial shaft fx  - Right open leigh implant distal 1/3 tibial shaft fx    Post-Op Diagnosis:   - Left grade 3A open distal 1/3 tibial shaft fx  - Right grade 3A open leigh implant distal 1/3 tibial shaft fx       Procedure(s):  - Debridement of left open tibia fx; skin, subq, muscle, and bone; CPT 23414  - IMN left distal tibial shaft fx with suprapatellar IMN; CPT 49632  - Debridement of right open tibia fx; skin, subq, muscle, and bone; CPT 46338  - IMN right distal tibial shaft fx with hindfoot IMN; CPT 11457  - Removal of hardware from right ankle; CPT 33606    Surgeon(s):  Nicolas Degroot DO    Assistant:  Resident: Zana Bill DO; Vanita Tariq MD; Kendrick Minaya DO    Anesthesia: General    Estimated Blood Loss (mL): 350 cc    IVF: 1500 cc crystalloid, 500 cc colloid     Complications: L tibia iatrogenic fx comminution    Specimens:   ID Type Source Tests Collected by Time Destination   1 : URINE CULTURE  Urine Urine, indwelling catheter CULTURE, URINE Nicolas Degroot DO 1/31/2022 1620        Implants:  Jonnathan 13 x 360 mm T2 alpha tibial IMN  Jonnathan 12 x 300 mm T2 hindfoot IMN  Implant Name Type Inv.  Item Serial No.  Lot No. LRB No. Used Action   TIBIA NAIL T2 ALPHA SYSTEM [id number]    [id number]  [id number] Left 1 Implanted   SCREW BONE L37.5MM DIA5MM ADV LCK  SCREW BONE L37.5MM DIA5MM ADV LCK  JONNATHAN [id number]WD  Left 2 Implanted   SCREW BONE L40MM DIA5MM ADV LCK  SCREW BONE L40MM DIA5MM ADV LCK  [employer]  Left 1 Implanted   SCREW BNE LCK 5X45 MM ADV STRL ALPHA  SCREW BNE LCK 5X45 MM ADV STRL ALPHA  [employer] [facility]  Left 1 Implanted   SCREW BNE ADV LCK [id number]  SCREW BNE ADV LCK [id number]  [employer] [facility]  Left 1 Implanted   SCREW BNE LCK 5X60 MM ADV STRL  SCREW BNE LCK 5X60 MM ADV STRL  [employer] [facility]  Left 1 Implanted   NAIL IM L300MM [id number] ANK TI ALLGRETCHEN MEHTA LON LAT BEND FOR  NAIL IM L300MM [id number] ANK TI ALLY JANINE LON LAT BEND FOR  [employer] [facility]  Right 1 Implanted   SCREW BNE L50MM DIA5MM AMBAR TI LON FULL THRD SHFT FOR T2 IM  SCREW BNE L50MM DIA5MM AMBAR TI LON FULL THRD SHFT FOR T2 IM  [employer] [facility]  Right 1 Implanted   SCREW BNE L37.5MM DIA5MM AMBAR TI LON FULL THRD SHFT FOR T2  SCREW BNE L37.5MM DIA5MM AMBAR TI LON FULL THRD SHFT FOR T2  [employer] [facility] [id number] Right 1 Implanted   SCREW BNE L27.5MM DIA5MM AMBAR TI LON FULL THRD SHFT FOR T2  SCREW BNE L27.5MM DIA5MM AMBAR TI LON FULL THRD SHFT FOR T2  [employer] [facility] [id number] Right 1 Implanted   SCREW BNE L27.5MM DIA5MM AMBAR TI LON FULL THRD SHFT FOR T2  SCREW BNE L27.5MM DIA5MM AMBAR TI LON FULL THRD SHFT FOR T2  [employer] [facility] [id number] Right 1 Implanted   SCREW BNE L75MM DIA5MM AMBAR TI LON FULL THRD SHFT FOR T2 IM  SCREW BNE L75MM DIA5MM AMBAR TI LON FULL THRD SHFT FOR T2 IM  [employer] [facility]  Right 1 Implanted   SCREW BNE L37.5MM DIA5MM AMBAR TI LON FULL THRD SHFT FOR T2  SCREW BNE L37.5MM DIA5MM AMBAR TI LON FULL THRD SHFT FOR T2  [employer] [facility] [id number] Right 1 Implanted   SCREW BNE L35MM DIA5MM AMBAR TI LON FULL THRD SHFT FOR T2 IM  SCREW BNE L35MM DIA5MM AMBAR TI LON FULL THRD SHFT FOR T2 IM  [employer] [facility]  Right 1 Implanted         Drains:   Urethral Catheter Double-lumen 16 fr (Active)       External Urinary Catheter (Active)   Catheter changed  Yes 01/31/22 0800   Urine Color Yellow 01/31/22 0800   Urine Appearance Clear 01/31/22 0800   Output (mL) 300 mL 01/31/22 0800   Suction 40 mmgHg continuous 01/31/22 0800   Placement Initiated 01/31/22 0800   Skin Assessment No Injury 01/31/22 0800       Findings: bilateral open distal tibias, see op note    Electronically signed by Kale Blanco DO on 1/31/2022 at 6:21 PM

## 2022-01-31 NOTE — PLAN OF CARE
Orthopedic Surgery Update Progress Note:    81 y/o female s/p debridement of bilateral open tibia fractures, intra-medullary nail fixation left distal tibia fracture, hindfoot intra-medullary nail fixation right distal tibia fracture POD #0     - Ok for diet from Orthopedic standpoint  - WBAT BLE  - Post-op abx: ancef 2g Q8H x 2 doses for standard post-op prophylaxis  - Pain control per primary  - Recommend orthopedic trauma post-op pain protocol if tolerated per patient and at discretion of primary team  - Acetaminophen 1000mg q6h              - naproxen 500mg q12h              - Gabapentin 300mg TID              - Cyclobenzaprine 10mg q6h              - Oxycodone 5-10mg q4-6h prn pain   - Dressing: bilateral lower extremity soft dressings in place, ok to reinforce as needed. If issues with strikethrough please page Ortho.  Will perform formal dressing change POD #2  - DVT: ok to restart chemical AC starting POD #2 as deemed appropriate per primary team  - Please page Ortho with questions    Lynn Scanlon DO  Orthopedic Surgery Resident, PGY-5  [address] [facility]

## 2022-01-31 NOTE — CONSULTS
PALLIATIVE CARE PLAN OF CARE    Patient: Ev Taylor    Room: [facility] OR [place] RM/NONE      The patient could not be seen as she was already taken to the OR. Will see tomorrow.         Hannah Osborne MD  Hospice/Palliative Care Fellow  [facility], [place], [place]  1/31/2022 2:53 PM

## 2022-02-01 NOTE — ACP (ADVANCE CARE PLANNING)
Advance Care Planning     Advance Care Planning (ACP) Physician/NP/PA (Provider) Conversation      Date of ACP Conversation: 1/30/2022    Conversation Conducted with: Patient currently does not have capacity to make decisions    Health Care Decision Maker:       Patient's POA for health is patient's daughter Barb Castro - [phone] as per patient's POA paperwork for health    Care Preferences:    Hospitalization: \"If your health worsens and it becomes clear that your chance of recovery is unlikely, what would your preference be regarding hospitalization? \"  Currently hospitalized    Ventilation: \"If you were in your present state of health and suddenly became very ill and were unable to breathe on your own, what would your preference be about the use of a ventilator (breathing machine) if it was available to you? \"    Full code    \"If your health worsens and it becomes clear that your chance of recovery is unlikely, what would your preference be about the use of a ventilator (breathing machine) if it was available to you? \"   Full code    Resuscitation:  \"CPR works best to restart the heart when there is a sudden event, like a heart attack, in someone who is otherwise healthy. Unfortunately, CPR does not typically restart the heart for people who have serious health conditions or who are very sick. \"    \"In the event your heart stopped as a result of an underlying serious health condition, would you want attempts to be made to restart your heart (answer \"yes\" for attempt to resuscitate) or would you prefer a natural death (answer \"no\" for do not attempt to resuscitate)? \"   Full code    Conversation Outcomes / Follow-Up Plan:   Patient's POA for health is patient's daughter Barb Castro ([phone]) as per patient's POA paperwork for health    Full code      Length of Voluntary ACP Conversation in minutes:  16 minutes         Seymour aMry MD

## 2022-02-01 NOTE — PROGRESS NOTES
Pharmacy Note     Renal Dose Adjustment    Krista Milton is a 80 y.o. female. Pharmacist assessment of renally cleared medications. Recent Labs     01/31/22 2028 02/01/22 0415   BUN 33* 36*       Recent Labs     01/31/22 2028 02/01/22 0415   CREATININE 1.53* 1.79*       Estimated Creatinine Clearance: 30 mL/min (A) (based on SCr of 1.79 mg/dL (H)).   Estimated CrCl using Ideal Body Weight: 23 mL/min (based on IBW 66.2 kg)    Height:   Ht Readings from Last 1 Encounters:   02/01/22 5' 9\" (1.753 m)     Weight:  Wt Readings from Last 1 Encounters:   01/31/22 260 lb (117.9 kg)       The following medication dose has been adjusted based upon renal function per P&T Guidelines:             Metoclopramide 10mg to 5mg IV Q6H    Charlene Roque PharmD [facility]  2/1/2022 2:46 PM

## 2022-02-01 NOTE — CARE COORDINATION
Call from [facility], they are unable to take patient as they don't have available beds and also do not take the patient's insurance. Referral sent to the second choice on the list - [facility].

## 2022-02-01 NOTE — OP NOTE
[address] [facility]                                OPERATIVE REPORT    PATIENT NAME: Meg Conti                   :        1933  MED REC NO:   [id number]                             ROOM:       5007  ACCOUNT NO:   [de-identified]                           ADMIT DATE: 2022  PROVIDER:     Kourtney Fletcher    DATE OF PROCEDURE:  2022    PREOPERATIVE DIAGNOSES:  1. Left open distal one-third tibial shaft fracture. 2.  Right open leigh-implant distal one-third tibial shaft fracture. POSTOPERATIVE DIAGNOSES:  1. Left grade IIIA open distal one-third tibial shaft fracture. 2.  Right grade IIIA open leigh-implant distal one-third tibial shaft  fracture. PROCEDURES:  1.  Debridement of left open tibial fracture including skin,  subcutaneous tissue, muscle and bone, CPT 19690.  2.  Intramedullary nail of left distal tibial shaft fracture with  suprapatellar intramedullary nail, CPT 02285.  3.  Debridement of right open tibial fracture including skin,  subcutaneous tissue, muscle and bone, CPT 09055.  4.  Intramedullary nail of right distal tibial shaft fracture with  hindfoot intramedullary nail, CPT 27753.  5.  Removal of hardware from right ankle, CPT 41550. ATTENDING SURGEON:  Kourtney Fletcher DO    ASSISTANTS:  1. Amara Degroot DO, PGY-5  2. Patricio Rasheed DO, PGY-3  3. Dre Cifuentes MD, PGY-2    ANESTHESIA:  General.    ESTIMATED BLOOD LOSS:  350 mL. IV FLUIDS:  1500 mL of crystalloid, 500 mL of colloid. COMPLICATIONS:  Left tibia iatrogenic fracture comminution. SPECIMENS:  None. IMPLANTS:  1.  Jonnathan 13 x 360 mm T2 Alpha tibial intramedullary nail. 2.  Jonnathan 12 x 300 mm T2 hindfoot intramedullary nail. INDICATIONS:  The patient is an 51-year-old female who lived at home but  had extensive support from her close family.   She was attempting to get  out of bed and used her walker when her legs gave out and caused her to  fall with both ankles getting entrapped under her body. Her daughter  was present and able to call [phone]. There was extensive lacerations of  the fracture site at the anteromedial face of bilateral tibias. She has  extensive past medical history including placement of pacemaker. She  received appropriate risk stratification from the primary and consulting  services. We discussed with the patient and her family the  recommendation for debridement of open fractures and surgical fixation. We planned for a standard intramedullary nail for the left tibia;  however, for the right tibia, there was retained hardware in the ankle  that would block the intramedullary canal that required removal as well  as the presence of a total knee prosthesis. Based off of preoperative  templating, there was not enough room to do a standard intramedullary  nail. We discussed the options of plate and screw fixation; however, it  would require a nonweightbearing status for prolonged period versus use  of a hindfoot nail which would be more stable and allow her to weight  bear medially, however, would cross the tibiotalar and subtalar joints. Ultimately the patient and family were agreeable with left tibial nail  and right hindfoot nail. Written informed consent had been obtained and  the operative sites were marked. The patient did admit to preexisting  bilateral neuropathy that extended proximally to approximately the level  of the knees. 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 DESCRIPTION OF PROCEDURE:  The patient was transferred to the operating  room and general anesthesia was administered by the anesthesia providers  without complication. She was transferred to a cantilever-type OR table  in a supine position.   All bony prominences were well padded, and she  was adequately secured to bed. A tourniquet was placed in bilateral  lower extremities. They were then isolated, scrubbed with Hibiclens  followed by alcohol and prepped and draped in the usual sterile fashion. A time-out was performed that included all involved parties in correctly  identifying the patient, planned procedure and operative sites, and  after everyone agreed, we continued. We began with the left distal  tibia and we performed an excisional debridement of the skin,  subcutaneous tissue, muscle and bone around the distal tibia fracture  site. Once the debridement had been completed, we copiously irrigated  with 9 liters of normal saline solution. We then repeated the same  process of debridement of open fracture from the skin, subcutaneous  tissue, muscle, and bone with a 9-liter irrigation on the right distal  tibia. We then made a standard suprapatellar approach for the left  tibia. The trocar and protection sleeve were inserted. The appropriate  start point with a guidewire in the AP and lateral radiographs of the  knee was confirmed. The wire was advanced. The cannulated opening  drill was inserted to appropriate depth. Everything was then removed  and exchanged for a ball-tip guidewire, which was advanced through the  intramedullary canal up to the fracture site. Then working through the  open wound, we were able to reduce and clamp the fracture. The ball-tip  guidewire was then passed across the fracture into the distal tibia. We  measured the appropriate length and reamed up to the fracture site with  a 14-mm reamer. Care was taken to assure the reduction was well  maintained throughout the reaming process. We then inserted a Jonnathan  T2 Alpha 13 x 360 mm tibial intramedullary nail.   Radiographs were  obtained just prior to crossing the fracture site and it was noted from  the lateral radiograph that there was an extensive comminution of the  posterior cortex, which had  from approximately the mid isthmus  level. The nail was backed out.  _____ fracture planes were reduced and  clamped, and the nail was reinserted with care to avoid such a  significant posterior trajectory of the nail. It was advanced into the  distal segment and fully impacted with all three distal screw holes past  the fracture and the nail fully buried proximally. We then placed two  transverse and one anterior-posterior distal interlocking bolts with the  advanced bolt options. Proximally, we placed a static transverse and  two oblique bolt options using the T2 advance bolts. We then copiously  irrigated the wound as well as the need to remove any debris from  reaming. We closed all surgical incisions in a standard layered  fashion. The traumatic wound was able to be approximated with minimal  tension after using 2-0 Vicryl sutures followed by 3-0 nylon in a  vertical mattress pattern. We then turned our attention to the right  leg once again. We made an incision over the medial malleolus. The  more anterior partially threaded screw was able to be removed using a  hand screwdriver without complication; however, the more posterior screw  was not mobile and fully stripped using a hand screwdriver. The screw  extraction device was unable to dislodge it. Therefore, resorted to  using a trephine and coring out the screw. After this maneuver, I was  able to remove it in its entirety. We also felt that not only were the  medial malleolus screws blocking the intramedullary canal necessary for  fixation but also the syndesmotic screws. They were localized  fluoroscopically and the appropriate portion of the previous lateral  incision was opened. The distal most syndesmotic screw was removed in  its entirety. However, the more proximal screw after backing out was  noted to have fractured along the lateral cortex of the tibia likely  before this accident. We withdrew the open wound.   We were able to  palpate the tip of the screw through the medial cortex from the distal  segment. Again a trephine was used and the remaining portion of this  broken screw was completely removed. The remaining implant about the  ankle were felt to not interfere with the intramedullary fixation,  therefore, were left intact. We then worked through the open wound, and  we were able to reduce and clamp the distal tibia fracture. The start  point in the hind foot was obtained after percutaneous insertion with a  guidewire and confirmed the location on AP and lateral radiographs. The  wire was advanced through the calcaneus, talus and tibial plafond after  confirming the appropriate trajectory as well as a neutral dorsiflexion  across the ankle joint. Incision was made over the wire which allowed  insertion of the opening drill and drill sleeve. This was removed and  exchanged for a ball-tip guidewire. The desired length was  approximately 360 mm; however, the longest available nail hindfoot nail was 300  mm. We then sequentially reamed up to 13-mm reamer and inserted a  [employer] 12 x 300 mm T2 hindfoot nail. Using the distal aiming arm, we  placed the talus and calcaneus bolts through percutaneous incisions. Proximally, we placed both transverse bolts through percutaneous  incisions in perfect [place] technique. Repeat radiographs showed an  appropriate alignment across the tibiotalar joint as well as at the  fracture site. We felt that since there was no direct fixation into the  distal tibial segment that blocking screws were necessary. We felt that  a recurvatum and valgus deformity were most likely.   Therefore, we  elected to place two 5.0-mm locking bolts through percutaneous  incisions, one just medial to the nail in the AP trajectory and another  just anterior to the nail in the medial to lateral trajectory, both of  which were noted to have direct contact against the nail and have  appropriate bicortical purchase. Final AP and lateral radiographs were  taken showed acceptable reduction, safe and appropriate implant  placement. The intramedullary reamings from the wound were collected in  a specimen cup. The reaming was copiously irrigated and removed, were  then reinserted around the nail to the fracture defect in order to  preserve the growth factors and promote healing. The wounds were then  closed in a standard fashion as well as a traumatic laceration as  described to the contralateral leg. The extremities were then cleaned  and dried and sterile bandages applied. The anesthesia team felt that  due to the patient's age, comorbidities and an extensive surgery that we  prefer to keep the patient intubated and assess her status and extubate  when appropriate. Therefore, the patient left the operating room  intubated and was transferred to the trauma ICU. POSTOPERATIVE PLAN:  We will receive digital x-rays of bilateral tibias  and right ankle. She will receive 23 hours of prophylactic antibiotics  and may begin DVT prophylaxis on postoperative day #1 if felt to be  appropriate by the Primary Service. She will be allowed to begin  weightbearing as tolerated immediately postoperative and will be  evaluated by Physical and Occupational Therapy. We will perform a wound  check of all wounds on postoperative day #2 and as needed thereafter. She will follow up in the orthopedic trauma clinic in approximately  10-14 days from the date of this procedure for wound checks and  anticipate suture removal at that time.         Abebe Founds    D: 01/31/2022 19:05:14       T: 01/31/2022 19:14:59     [email or web address]  Job#: [id number]     Doc#: [id number]    CC:

## 2022-02-01 NOTE — PROGRESS NOTES
Physician Progress Note      PATIENT:               Andre Younger  CSN #:                  [id number]  :                       1933  ADMIT DATE:       2022 11:29 AM  DISCH DATE:  RESPONDING  PROVIDER #:        Sajan Rangel MD          QUERY TEXT:    Pt admitted with  bilateral open tib/fib fractures . Pt noted to have   hypotension post surgically and placed on pressors. If possible, please   document in the progress notes and discharge summary if you are evaluating   and/or treating any of the following: The medical record reflects the following:  Risk Factors: Age 80, bilateral open tib/fib fractures, surgery for fractures  Clinical Indicators: HR , MAP 57-77, BNP 1700, ,Opens eyes to verbal,   follows commands, per  trauma note- Overnight, the patient was having   intermittent hypotension, ultimately requiring levophed to maintain maps   greater than 65. Currently in A Fib w/ RVR  Treatment: Levophed and Vasopressin IV , Digoxin 125mg , precedex IV ,   lactated ringers [id number] mL/hr    Thank you, Please call if questions  Yolanda Sandoval RN CDS  [phone]  Options provided:  -- cardiogenic shock  -- Hypovolemic Shock  -- Hypotensive Shock  -- Hypotension without Shock  -- Other - I will add my own diagnosis  -- Disagree - Not applicable / Not valid  -- Disagree - Clinically unable to determine / Unknown  -- Refer to Clinical Documentation Reviewer    PROVIDER RESPONSE TEXT:    Provider is clinically unable to determine a response to this query.     Query created by: Jose David Johnson on 2022 1:55 PM      Electronically signed by:  Sajan Rangel MD 2022 2:36 PM

## 2022-02-01 NOTE — PROGRESS NOTES
[facility]   Progress Note                   Date:   2/1/2022  Patient name: Amanda Sims  Date of admission:  1/30/2022 11:29 AM  MRN:   [id number]  YOB: 1933  PCP: Edin Chacon DO    Reason for Admission: Risk stratification    Subjective:       No acute episodes overnight  Patient underwent surgical procedure yesterday via orthopedics  Arrhythmia continues to be rate controlled  A.m. CBC and BMP reviewed  Readings from echo ordered on January 30, 2022 reviewed    Medications:   Scheduled Meds:   pregabalin  100 mg Per G Tube 3 times per day    levothyroxine  25 mcg Per G Tube Daily    primidone  50 mg Oral TID    heparin (porcine)  5,000 Units SubCUTAneous 3 times per day    metoclopramide  10 mg IntraVENous 4 times per day    acetaminophen  1,000 mg Oral Q8H    chlorhexidine  15 mL Mouth/Throat BID    famotidine (PEPCID) injection  20 mg IntraVENous Daily    sodium chloride flush  5-40 mL IntraVENous 2 times per day    polyethylene glycol  17 g Oral Daily     Continuous Infusions:   dexmedetomidine 0.2 mcg/kg/hr (02/01/22 1031)    vasopressin (Septic Shock) infusion Stopped (02/01/22 1251)    fentaNYL 75 mcg/hr (02/01/22 0955)    norepinephrine 6 mcg/min (02/01/22 1330)    sodium chloride      lactated ringers 75 mL/hr at 02/01/22 0515     CBC:   Recent Labs     01/31/22  0556 01/31/22  0556 01/31/22 1745 01/31/22 2028 02/01/22  0415   WBC 7.6  --   --  11.2 15.1*   HGB 10.3*   < > 9.2 7.4* 8.0*   *  --   --  See Reflexed IPF Result 132*    < > = values in this interval not displayed. BMP:    Recent Labs     01/31/22  0556 01/31/22  1745 01/31/22 2028 02/01/22 0415   * 137 136 136   K 3.8 4.0 4.2 4.1   CL 96*  --  100 99   CO2 27  --  24 23   BUN 36*  --  33* 36*   CREATININE 1.70*  --  1.53* 1.79*   GLUCOSE 118*  --  135* 151*     Hepatic: No results for input(s): AST, ALT, ALB, BILITOT, ALKPHOS in the last 72 hours.   Troponin: No results for input(s): TROPONINI in the last 72 hours. BNP: No results for input(s): BNP in the last 72 hours. Lipids: No results for input(s): CHOL, HDL in the last 72 hours. Invalid input(s): LDLCALCU  INR:   Recent Labs     22  1201   INR 1.0     Objective:   Vitals: BP (!) 102/39   Pulse 113   Temp 98.6 °F (37 °C) (Oral)   Resp 12   Ht 5' 9\" (1.753 m)   Wt 260 lb (117.9 kg)   SpO2 96%   BMI 38.40 kg/m²      Constitutional and General Appearance: alert, cooperative, no distress and appears stated age  HEENT: PERRL, no cervical lymphadenopathy. No masses palpable. Normal oral mucosa  Respiratory:  · Normal excursion and expansion without use of accessory muscles  · Resp Auscultation: Good respiratory effort. No for increased work of breathing. On auscultation: clear to auscultation bilaterally  Cardiovascular:  · The apical impulse is not displaced  · Heart tones are crisp and normal. regular S1 and S2.  · Jugular venous pulsation Normal  · The carotid upstroke is normal in amplitude and contour without delay or bruit  · Peripheral pulses are symmetrical and full   Abdomen:   · No masses or tenderness  · Bowel sounds present  ·    Neurological:  · Alert and oriented. · Moves all extremities well  · No abnormalities of mood, affect, memory, mentation, or behavior are noted    [id number]  Atrial fibrillation with rapid ventricular response with premature ventricular or aberrantly conducted complexes    Echocardiogram:  2022  Technically difficult study  Normal LV size , moderately increased wall thickness. Hyperdynamic wall motions  Normal LV systolic function with LVEF >65%. Normal RV size and function. RV systolic pressure 40 mmHg  LA is moderately dilated, RA appears dilated. Calcified AV. Moderate AS. Peak gradient 54 mmHg, mean gradient 28 mmHg  Normal aortic root dimension. No significant pericardial effusion noted.   IVC is dilated, impaired or no inspiratory variation indicating elevated RA  filling pressure. Assessment:   1. Atrial Fibrillation - Rate controlled, CHADS VASC Score of 6, HAS BLED Score of 1  2. H/O PPM    Patient Active Problem List:     Fall at home, initial encounter     Type III open fracture of both tibias    Treatment Plan:   1. Will start IV Digoxin  2. Okay for DVT Prophylaxis. 3. Further management per Primary  4. Will follow. 5. Further recommendations after discussion with Dr. Stan Vogel MD  PGY-2, Internal Medicine Resident  [facility]  2/1/2022 2:42 PM    Attending Physician Statement  I have discussed the care of Yamil Morillo, including pertinent history and exam findings,  with the cardiology fellow/resident. I have seen and examined the patient and the key elements of all parts of the encounter have been performed by me. I agree with the assessment, plan and orders as documented by the resident with additional recommendations as below:       Patient is post op day 1 after ORIF bilateral tibia-fibular fracture, intubated and sedated, hypotensive and on low dose levophed, remains in atrial fib, -120. Will give IV Dig 125 mcg x 2 for better rate control. Consider switching levophed to vasopressin/phenylephrine. Patient does have watchman device in situ. Will hold off on anticoagulation at this time. DVT prophylaxis if ok with ortho. Low dose iv Lasix as needed based on I/O.          Terri Quintero MD, [address] [facility]

## 2022-02-01 NOTE — PROCEDURES
PROCEDURE NOTE - ARTERIAL LINE PLACEMENT    PATIENT NAME: [address] RECORD NO. [id number]  DATE: 2/1/2022  ATTENDING PHYSICIAN:  Carolyn Malone MD       PREOPERATIVE DIAGNOSIS:  Need for blood pressure monitoring  POSTOPERATIVE DIAGNOSIS:  Same  PROCEDURE PERFORMED: Left Femoral Arterial Line Insertion  PERFORMING PHYSICIAN:  Haley Calhoun MD  ESTIMATED BLOOD LOSS:  Less than 25 ml  COMPLICATIONS:  None immediately appreciated. DISCUSSION:  Jeanette Arredondo is a 80 y.o. female who requires invasive pressure monitoring. The history and physical examination were reviewed and confirmed. CONSENT: Unable to be obtained due to patient's condition. PROCEDURE:  A timeout was initiated by the bedside nurse and was confirmed by those present. The patient was placed in a supine position. The skin overlying the Left Femoral was prepped with chlorhexadine. Through this region, the introducer needle through catheter was inserted into the left femoral artery until pulsatile bright blood was seen in collection tubing. Guidewire was advanced with no resistance. Catheter was advanced into the artery, wire was pulled with brisk bleeding noted. Pressure monitoring setup was connected to the catheter, it aspirated and flushed easily. The catheter was secured with 3-0 silk. No immediate complication was evident. All sponge, instrument and needle counts were correct at the completion of the procedure.       Haley Calhoun MD  10:30 AM, 2/1/22

## 2022-02-01 NOTE — PROCEDURES
Arterial Line Placement Procedure Note    DATE: 1/30/2022  RE: Niki Zarco   12/18/1933    PREOPERATIVE DIAGNOSIS:  Hypotension    POSTOPERATIVE DIAGNOSIS:  Hypotension    INDICATION:  Need or invasive blood pressure monitoring. OPERATION PERFORMED:  Placement of a 18 Gauge  Arterial line in right radial artery    Performed by: Dyanne Leventhal, DO    ASSISTANT(S):  Rosetta Norris DO    ANESTHESIA:  None    Procedure: Sterile technique was initiated (hand washing, gown, cap, mask, gloves, draping) before the skin over the right radial artery was prepped with betadine and draped in a sterile fashion. After skin infiltration with 1% plain lidocaine, the vessel was identified with a 20 Ga. introducer needle and a guide wire was placed without difficulty. Then, a 18 Ga. catheter was easily threaded. Good waveform obtained on monitor and line withdrew and flushed well. A-line was secured with skin sutures, and dressed. Complications: None    Rosetta Norris DO  4:41 AM  Tolerated well. Present for procedure.

## 2022-02-01 NOTE — PLAN OF CARE
Problem: Pain:  Goal: Pain level will decrease  Description: Pain level will decrease  Outcome: Ongoing  Goal: Control of acute pain  Description: Control of acute pain  Outcome: Ongoing  Goal: Control of chronic pain  Description: Control of chronic pain  Outcome: Ongoing     Problem: Falls - Risk of:  Goal: Will remain free from falls  Description: Will remain free from falls  Outcome: Ongoing  Goal: Absence of physical injury  Description: Absence of physical injury  Outcome: Ongoing     Problem: Skin Integrity:  Goal: Will show no infection signs and symptoms  Description: Will show no infection signs and symptoms  Outcome: Ongoing  Goal: Absence of new skin breakdown  Description: Absence of new skin breakdown  Outcome: Ongoing     Problem: OXYGENATION/RESPIRATORY FUNCTION  Goal: Patient will maintain patent airway  2/1/2022 0817 by Alina Garsia RN  Outcome: Ongoing  1/31/2022 1914 by Karsten Gilford, RCP  Outcome: Ongoing  Goal: Patient will achieve/maintain normal respiratory rate/effort  Description: Respiratory rate and effort will be within normal limits for the patient  2/1/2022 0817 by Alina Garsia RN  Outcome: Ongoing  1/31/2022 1914 by Karsten Gilford, RCP  Outcome: Ongoing     Problem: MECHANICAL VENTILATION  Goal: Patient will maintain patent airway  2/1/2022 0817 by Alina Garsia RN  Outcome: Ongoing  1/31/2022 1914 by Karsten Gilford, RCP  Outcome: Ongoing  Goal: Oral health is maintained or improved  2/1/2022 0817 by Alina Garsia RN  Outcome: Ongoing  1/31/2022 1914 by Karsten Gilford, RCP  Outcome: Ongoing  Goal: ET tube will be managed safely  2/1/2022 0817 by Alina Garsia RN  Outcome: Ongoing  1/31/2022 1914 by Karsten Gilford, RCP  Outcome: Ongoing  Goal: Ability to express needs and understand communication  2/1/2022 [phone] by Alina Garsia RN  Outcome: Ongoing  1/31/2022 1914 by Karsten Gilford, RCP  Outcome: Ongoing  Goal: Mobility/activity is maintained at optimum level for patient  2/1/2022 9164 by Judah Gregorio RN  Outcome: Ongoing  1/31/2022 1914 by Sol Bergeron RCP  Outcome: Ongoing     Problem: SKIN INTEGRITY  Goal: Skin integrity is maintained or improved  2/1/2022 0817 by Judah Gregorio RN  Outcome: Ongoing  1/31/2022 1914 by Sol Bergeron RCP  Outcome: Ongoing     Problem: Non-Violent Restraints  Goal: Removal from restraints as soon as assessed to be safe  Outcome: Ongoing  Goal: No harm/injury to patient while restraints in use  Outcome: Ongoing  Goal: Patient's dignity will be maintained  Outcome: Ongoing

## 2022-02-01 NOTE — ANESTHESIA POSTPROCEDURE EVALUATION
Department of Anesthesiology  Postprocedure Note    Patient: Kaykay Powers  MRN: [id number]  YOB: 1933  Date of evaluation: 1/31/2022  Time:  7:20 PM     Procedure Summary     Date: 01/31/22 Room / Location: [address]    Anesthesia Start: 4529 Anesthesia Stop: 1919    Procedure: IRRIGATION AND DEBRIDEMENT BILATERAL OPEN TIBIA FRACTURES, LEFT TIBIA IMN, RIGHT TIBIA TCC IMN WITH HARDWARE REMOVAL RIGHT ANKLE (Bilateral ) Diagnosis: (BILATERAL TIB/FIB FRACTURE)    Surgeons: Rabia Osuna DO Responsible Provider: Gamal Thurman MD    Anesthesia Type: general ASA Status: 4          Anesthesia Type: general    Venancio Phase I:      Venancio Phase II:      Last vitals: Reviewed and per EMR flowsheets.        Anesthesia Post Evaluation    Patient location during evaluation: ICU  Patient participation: complete - patient cannot participate  Level of consciousness: sedated and ventilated  Pain score: 0  Airway patency: patent  Nausea & Vomiting: no nausea and no vomiting  Complications: no  Cardiovascular status: hemodynamically stable  Respiratory status: acceptable, ventilator and intubated  Hydration status: stable

## 2022-02-01 NOTE — DISCHARGE INSTR - COC
Continuity of Care Form    Patient Name: Autumn Ramirez   :  1933  MRN:  [id number]    [facility] date:  2022  Discharge date:  2022    Code Status Order: Full Code   Advance Directives:   [facility] Documentation       Date/Time Healthcare Directive Type of Healthcare Directive Copy in [address] Agent's Name Healthcare Agent's Phone Number    22 1119 Yes, patient has an advance directive for healthcare treatment Durable power of  for health care Yes, copy in chart Adult Children Daughter- --            Admitting Physician:  Adele Newsome MD  PCP: Doroteo Cunha DO    Discharging Nurse: [facility] Unit/Room#: [address] [place] [address] Phone Number: [phone]      Emergency Contact:   Extended Emergency Contact Information  Primary Emergency Contact: Gaby Husbands, [address] Phone: [phone]  Mobile Phone: [phone]  Relation: Child  Preferred language: English  Secondary Emergency Contact: NAOMY Sanchez 1 Phone: [phone]  Relation: Grandchild    Past Surgical History:  Past Surgical History:   Procedure Laterality Date    LAMINECTOMY      PACEMAKER INSERTION      PACEMAKER PLACEMENT      POLYPECTOMY      REVISION TOTAL KNEE ARTHROPLASTY Right        Immunization History:   Immunization History   Administered Date(s) Administered    COVID-19, Pfizer Purple top, DILUTE for use, 12+ yrs, 30mcg/0.3mL dose 2021, 02/10/2021, 10/11/2021    Tdap (Boostrix, Adacel) 2022       Active Problems:  Patient Active Problem List   Diagnosis Code    Fall at home, initial encounter W19. XXXA, Y92.009    Type III open fracture of both tibias S82.201C, S82.202C       Isolation/Infection:   Isolation            No Isolation          Patient Infection Status       Infection Onset Added Last Indicated Last Indicated By Review Planned Expiration Resolved Resolved By    None active    Resolved    COVID-19 (Rule Out) 22 01/30/22 01/30/22 COVID-19, Rapid (Ordered)   01/30/22 Rule-Out Test Resulted            Nurse Assessment:  Last Vital Signs: BP (!) 119/40   Pulse 114   Temp 98.6 °F (37 °C) (Oral)   Resp 17   Ht 5' 9\" (1.753 m)   Wt 260 lb (117.9 kg)   SpO2 95%   BMI 38.40 kg/m²     Last documented pain score (0-10 scale): Pain Level: 10  Last Weight:   Wt Readings from Last 1 Encounters:   01/31/22 260 lb (117.9 kg)     Mental Status:  oriented, alert, coherent, logical, thought processes intact, and able to concentrate and follow conversation    IV Access:  - None    Nursing Mobility/ADLs:  Walking   Dependent  Transfer  Dependent  Bathing  Dependent  Dressing  Dependent  Toileting  Dependent  Feeding  Dependent  Med Admin  Dependent  Med Delivery   crushed and put down NG    Wound Care Documentation and Therapy: has mepilex on sacrum          Elimination:  Continence: Bowel: No  Bladder: No  Urinary Catheter: Insertion Date: ***    Colostomy/Ileostomy/Ileal Conduit: No       Date of Last BM: ***    Intake/Output Summary (Last 24 hours) at 2/1/2022 1035  Last data filed at 2/1/2022 0700  Gross per 24 hour   Intake 4435.38 ml   Output 1070 ml   Net 3365.38 ml     I/O last 3 completed shifts: In: 6050.4 [I.V.:5050.4;  Blood:500; IV [id number]:420]  Out: 2020 [Urine:1620; Emesis/NG output:50; Blood:350]    Safety Concerns:     History of Falls (last 30 days)    Impairments/Disabilities:      Hearing    Nutrition Therapy:  Current Nutrition Therapy:   - Tube Feedings:  Immune Enhancing    Routes of Feeding: Nasogastric  Liquids: No Restrictions  Daily Fluid Restriction: no  Last Modified Barium Swallow with Video (Video Swallowing Test): not done    Treatments at the Time of Hospital Discharge:   Respiratory Treatments: ***  Oxygen Therapy:  Oxygen at 4 liters per NC  Ventilator:    - No ventilator support    Rehab Therapies: ***  Weight Bearing Status/Restrictions: WBAT  Other Medical Equipment (for information only, NOT a DME order):  none  Other Treatments: ***    Patient's personal belongings (please select all that are sent with patient):  Sent with all belongings    RN SIGNATURE:  [place] RN    CASE MANAGEMENT/SOCIAL WORK SECTION    Inpatient Status Date: ***    Readmission Risk Assessment Score:  Readmission Risk              Risk of Unplanned Readmission:  13           Discharging to Facility/ Agency   Name:   Address:  Phone:  Fax:    Dialysis Facility (if applicable)   Name:  Address:  Dialysis Schedule:  Phone:  Fax:    / signature: {Esignature:[id number]}    PHYSICIAN SECTION    Prognosis: Guarded    Condition at Discharge: Stable    Rehab Potential (if transferring to Rehab): {Prognosis:[id number]}    Recommended Labs or Other Treatments After Discharge: ***    Physician Certification: I certify the above information and transfer of Rosalva Medley  is necessary for the continuing treatment of the diagnosis listed and that she requires [place] for less 30 days.      Update Admission H&P: No change in H&P    PHYSICIAN SIGNATURE:  Electronically signed by CARL Guillaume CNP on 2/1/22 at 10:35 AM EST

## 2022-02-01 NOTE — ACP (ADVANCE CARE PLANNING)
I spoke with the patient's legal Leodis Fraction, over the phone. The POA indicated that her mother's code status should be DNR-CCA. The code status has been updated to reflect this conversation.     Electronically signed by Geovanna Appiah MD on 2/1/2022 at 12:55 PM

## 2022-02-01 NOTE — CONSULTS
Palliative Care Inpatient Consult    NAME:  Gabo Santo RECORD NUMBER:  [id number]  AGE: 80 y.o.    GENDER: female  : 1933  TODAY'S DATE:  2022    Reasons for Consultation:    Symptom and/or pain management  Provision of information regarding PC and/or hospice philosophies  Complex, time-intensive communication and interdisciplinary psychosocial support  Clarification of goals of care and/or assistance with difficult decision-making  Guidance in regards to resources and transition(s)    Members of PC team contributing to this consultation are :  Dr. Latoya Augustine care attending  Plan      Palliative Interaction:  The patient was seen today, remains intubated and sedated  Patient currently was getting an echo of the heart done  I met with patient's daughter Eliseo Whitfield and granddaughter Tash Melton  I introduced myself to the family and explained them the role of palliative care and told him that palliative care is next delivered support and strength for the patient and family  Eliseo Whitfield told that patient has been living alone but someone from the family keeps on checking on her numerous times  Eliseo Whitfield told the patient is  and had 4 children but only 2 daughters are living and both sons are   I explained the significance of POA paperwork for health to Eliseo Whitfield and she told that patient has POA paperwork for health and her sister Keshia Andrade is patient's POA for health  Tash Melton showed me the POA paperwork for health and I reviewed them and patient has made her daughter Keshia Andrade as her POA for health  Eliseo Whitfield told that she lives in [place] but Keshia Andrade and Tash Melton live near patient and currently Keshia Andrade is in [place] visiting family  I explained the different types of codes to the family and advised them to discuss with Keshia Andrade and if Keshia Andrade and family wanted patient's CODE STATUS to be changed then Keshia Andrade can call the unit and talk with one of the doctors from the primary care team and the code can be changed  I offered comfort and emotional support to the family    I met with primary care NP's Meet Martin and Norma Hennessy and updated them regarding my conversation with daughter Cristopher Garcia and granddaughter Norma Hennessy and also informed them that patient's daughter Mau García is patient's POA for health as per patient's POA paperwork for health  I also told Meet Martin and Norma Estebanmindy that family was explained about the different types of codes by me and they may call for CODE STATUS change    Education/support to staff  Education/support to family  Education/support to patient  Discharge planning/helping to coordinate care  Communications with primary service  Caregiver support/education  Code status clarified: Full Code  Code status clarified: [address]  Code status clarified: [facility]  Other major issues      History of Present Illness     The patient is a 80 y.o. Non- / non  female who presents with Fall and Leg Pain (bilateral)    Referred to Palliative Care by   [x] Physician   [] Nursing  [] Family Request   [] Other:       She was admitted to the trauma service for Fall at home, initial encounter [W19. XXXA, Y92.009]. Her hospital course has been associated with <principal problem not specified>. The patient has a complicated medical history and has been hospitalized since 1/30/2022 11:29 AM.  The history has been obtained from patient's records. Patient was brought in by the EMS were called by patient's family when patient's daughter heard a thud and screaming and found patient on the ground with open bilateral tib/fib fractures while getting out of bed, there is no history of LOC. Patient told that she was not pushed or abused. Ortho surgery was consulted and patient underwent intramedullary nail fixation left distal tibia fracture, hindfoot intramedullary nail fixation right distal tibia fracture yesterday 01/31/22. Palliative care consulted for goals of care and CODE STATUS discussion and family support.     D[address] [place] [address] Problems    Diagnosis Date Noted    Type III open fracture of both tibias [S82.201C, S82.202C]     Fall at home, initial encounter [W19. Phylicia Doranuro, T72.131] 01/30/2022       PAST MEDICAL HISTORY      Diagnosis Date    Aortic stenosis     Arthritis     Atrial fibrillation ([address]     CAD (coronary artery disease)     Carpal tunnel syndrome     CHF (congestive heart failure) (HCC)     Diabetes mellitus (HCC)     Dysphonia     GERD (gastroesophageal reflux disease)     Gout     Hiatal hernia     Hyperlipidemia     Hypertension     Kidney disease     Mitral valve insufficiency     Neuropathy     Osteopenia     Presence of Watchman left atrial appendage closure device     Sleep apnea     Thyroid disease     Urinary incontinence        PAST SURGICAL HISTORY  Past Surgical History:   Procedure Laterality Date    LAMINECTOMY      PACEMAKER INSERTION      PACEMAKER PLACEMENT      POLYPECTOMY      REVISION TOTAL KNEE ARTHROPLASTY Right        SOCIAL HISTORY  Social History     Tobacco Use    Smoking status: Never Smoker    Smokeless tobacco: Never Used   Vaping Use    Vaping Use: Unknown   Substance Use Topics    Alcohol use: Not Currently    Drug use: Not Currently       ALLERGIES  Allergies   Allergen Reactions    Adhesive Tape     Lisinopril          MEDICATIONS  Current Medications    acetaminophen  1,000 mg Oral Q8H    ceFAZolin  2,000 mg IntraVENous Q8H    chlorhexidine  15 mL Mouth/Throat BID    famotidine (PEPCID) injection  20 mg IntraVENous Daily    sodium chloride flush  5-40 mL IntraVENous 2 times per day    polyethylene glycol  17 g Oral Daily    gabapentin  300 mg Oral TID     sodium chloride, iopamidol, sodium chloride flush, sodium chloride, ondansetron  IV Drips/Infusions   propofol Stopped (02/01/22 0703)    fentaNYL 100 mcg/hr (02/01/22 0700)    norepinephrine 10 mcg/min (02/01/22 0813)    sodium chloride      sodium chloride      lactated ringers 75 mL/hr at 02/01/22 0515     Home Medications  No current facility-administered medications on file prior to encounter. No current outpatient medications on file prior to encounter. Data         BP (!) 119/40   Pulse 116   Temp 98.6 °F (37 °C) (Oral)   Resp 17   Ht 5' 9\" (1.753 m)   Wt 260 lb (117.9 kg)   SpO2 96%   BMI 38.40 kg/m²     Wt Readings from Last 3 Encounters:   01/31/22 260 lb (117.9 kg)        Code Status: Full Code     ADVANCED CARE PLANNING:  Patient has capacity for medical decisions: no  Health Care Power of : yes  Living Will: no     Personal, Social, and Family History  Marital Status:   Living situation: alone  Does patient understand diagnosis/treatment? no  Does caregiver understand diagnosis/treatment? yes    Past Medical History:   Diagnosis Date    Aortic stenosis     Arthritis     Atrial fibrillation ([address]     CAD (coronary artery disease)     Carpal tunnel syndrome     CHF (congestive heart failure) (HCC)     Diabetes mellitus (HCC)     Dysphonia     GERD (gastroesophageal reflux disease)     Gout     Hiatal hernia     Hyperlipidemia     Hypertension     Kidney disease     Mitral valve insufficiency     Neuropathy     Osteopenia     Presence of Watchman left atrial appendage closure device     Sleep apnea     Thyroid disease     Urinary incontinence          History reviewed. No pertinent family history.     Social History     Tobacco Use    Smoking status: Never Smoker    Smokeless tobacco: Never Used   Vaping Use    Vaping Use: Unknown   Substance Use Topics    Alcohol use: Not Currently    Drug use: Not Currently       Assessment        REVIEW OF SYSTEMS  ROS unable to be obtained, patient intubated and sedated    Constitutional: no fever, no chills or weight loss  Eyes: no eye pain or blurred vision  ENT: no hearing loss, congestion, or difficulty swallowing   Respiratory: no wheezing, chest tightness, or shortness of breath   Cardiovascular: no chest pain or pressure, no palpitations, no diaphoresis   Gastrointestinal: no nausea, vomiting,abdominal pain, diarrhea or constipation, no melena   Genitourinary: no dysuria, frequency, hematuria, or nocturia   Musculoskeletal: no myalgias or arthralgias, no back pain   Skin: no rashes or sores   Neurological: no focal weakness, numbness, tingling or headache, no seizures    PHYSICAL ASSESSMENT:  Constitutional: Intubated and sedated  Head: Normocephalic and atraumatic. Eyes: EOM are normal  Neck: ET tube present  Cardiovascular: Normal rate and regular rhythm  Pulmonary/Chest: On mechanical ventilator  Abdomen: not distended, no ascites  Musculoskeletal: Dressings present in both lower extremities  Neurological: Intubated, sedated, following commands off sedation  Skin: Normal turgor, no bleeding    Palliative Performance Scale:  ___60%  Ambulation reduced; Significant disease; Can't do hobbies/housework; intake normal or reduced; occasional assist; LOC full/confusion  ___50%  Mainly sit/lie; Extensive disease; Can't do any work; Considerable assist; intake normal or reduced; LOC full/confusion  ___40%  Mainly in bed; Extensive disease; Mainly assist; intake normal or reduced; LOC full/confusion   __x_30%  Bed Bound; Extensive disease; Total care; intake reduced; LOC full/confusion  ___20%  Bed Bound; Extensive disease; Total care; intake minimal; Drowsy/coma  ___10%  Bed Bound; Extensive disease; Total care; Mouth care only; Drowsy/coma  ___0       Death      Principle Problem/Diagnosis:  Fall, initial encounter    Additional Assessments:   Active Problems:    Fall at home, initial encounter    Type III open fracture of both tibias    1- Symptom management/ pain control     Pain Assessment:  Pain is controlled with current analgesics.   Medication(s) being used: acetaminophen, narcotic analgesics including fentanyl               Anxiety:  none                          Dyspnea:  none Fatigue:  none    Other: Intubated    We feel the patient symptoms are being controlled. her current regimen is reviewed by myself and discussed with the staff. We will follow-up with the CODE STATUS discussion and further goals of care with the family  We will continue to provide comfort and support to the patient and family    ACP note will be completed by me    2- Goals of care evaluation   The patient goals of care are improve or maintain function/quality of life, accomplish a particular personal goal, spiritual needs, strengthening relationships, preserve independence/autonomy/control and support for family/caregiver   Goals of care discussed with:    [] Patient independently    [] Patient and Family    [x] Family or Healthcare DPOA independently    [] Unable to discuss with patient, family/DPOA not present    3- Code Status  Full Code    4- Other recommendations   - We will continue to provide comfort and support to the patient and the family  Please call with any palliative questions or concerns. Palliative Care Team is available via perfect serve or via phone. Palliative Care will continue to follow Ms. Viveros's care as needed. Thank you for allowing Palliative Care to participate in the care of Ms. Rishabh Schilling . This note has been dictated by dragon, typing errors may be a possibility.     The total time I spent in seeing the patient, discussing goals of care, advanced directives, code status, greater than 50% time in counseling and other major issues was more than 60 minutes      Electronically signed by   Teodora Baum MD  Palliative Care Team  on 2/1/2022 at 8:40 AM    Palliative care office: [phone]

## 2022-02-01 NOTE — PROGRESS NOTES
Orthopedic Progress Note    Patient:  Magno Soriano  YOB: 1933     80 y.o. female    Subjective:  Patient seen and examined at bedside this morning  Intubated and sedated in the TICU, minimally responsive to commands. Currently on pressors   Received 1uPRBC overnight after OR  Able to assess ROS/pain due to intubation    Vitals reviewed, afebrile    Objective:   Vitals:    02/01/22 0345   BP:    Pulse: 104   Resp: 18   Temp:    SpO2: 97%     Gen: Intubated, sedated    Cardiovascular: Tachycardic    Respiratory: Chest symmetric, intubated    RLE: ACE and dressings, which is clean/dry/intact. Compartments soft and easily compressible. Able to flex and extend toes, but unable to respond to sensory exam. Toes demonstrated BCR. DP shows weak doppler signal.     LLE: Ace and dressings on, which are clean/dry/intact. Compartments soft and easily compressible. Able to flex and extend toes, but unable to respond to sensory exam.  Toes demonstrate BCR. Able to obtain a faint PT dopplerable signal.      Recent Labs     01/30/22  1201 01/31/22  0556 01/31/22 2028   WBC 12.6*   < > 11.2   HGB 12.2   < > 7.4*   HCT 36.8   < > 22.2*      < > See Reflexed IPF Result   INR 1.0  --   --       < > 136   K 4.5   < > 4.2   BUN 35*   < > 33*   CREATININE 1.45*   < > 1.53*   GLUCOSE 114*   < > 135*    < > = values in this interval not displayed.       Meds: Ancef  See rec for complete list    Impression/plan: 80 y.o. female who fell from standing height, being seen for:     - Bilateral open tibia fractures, intra-medullary nail fixation left distal tibia fracture, hindfoot intra-medullary nail fixation right distal tibia fracture POD #1    -No plan for further orthopedic intervention at this time  -WB status: WBAT BLE  -Complete postop Ancef   -Maintain dressings on; OK to change ace bandage as needed  -Okay to continue DVT prophylaxis POD 2  -Pain/ABX/DVT regimen per primary team  -Ice (20 min, 1 hour off) for edema/pain control  -Encourage deep breathing and incentive spirometry use  -Encouraged to work with PT/OT for evaluation and treatment  -Please page ortho with any questions    Vicki Suarez DO  Orthopedic Surgery Resident, PGY-1  [place] [address], [place]    PGY-2 Addendum    Patient seen and examined. Agree with subjective and objective portions from Dr. Sumit Sarmiento. The patient is a 80 y.o. female with the surgical course as listed above. WBAT to BLE. Complete post op abx. OK to change ace-wrap as needed. Resume chemical AC POD2 if medically permissible. No further plan for surgical intervention at this time. Ortho will continue to monitor.     Electronically signed by Marie Martinez DO 5:34 AM 2/1/2022

## 2022-02-01 NOTE — FLOWSHEET NOTE
PIV placed by ultrasound by  22g right forearm.     Electronically signed by Luisa Stewart RN on 2/1/2022 at 6:18 PM

## 2022-02-01 NOTE — PLAN OF CARE
Nutrition Problem #1: Inadequate oral intake  Intervention: Food and/or Nutrient Delivery:  (Plan to start tube feeding at 25 mL/hr today.  When able to advance rate, suggest goal of 45 mL/hr to provide 1620 kcal and 101 g pro/day.)  Nutritional Goals: meet % of estimated nutrient needs

## 2022-02-01 NOTE — PROGRESS NOTES
Comprehensive Nutrition Assessment    Type and Reason for Visit:  Initial,Consult (TF recommendations only -provider to manage)    Nutrition Recommendations/Plan: Plan to start tube feeding at 25 mL/hr today. Monitor TF tolerance/adequacy. When able to advance rate, suggest goal of 45 mL/hr to provide 1620 kcal and 101 g pro/day. Nutrition Assessment:  Pt admitted with bilateral open tibia fractures s/p fall. S/p IMN placement bilaterally 1/31/22. Pt currently intubated. Plan to start Immune Enhancing TF at 25 mL/hr today. RD consulted for TF recommendations only. Meds/labs reviewed. Malnutrition Assessment:  Malnutrition Status: At risk for malnutrition  Context:  Acute Illness     Findings of the 6 clinical characteristics of malnutrition:  Energy Intake:  Mild decrease in energy intake  Weight Loss:  Unable to assess     Body Fat Loss:  No significant body fat loss     Muscle Mass Loss:  No significant muscle mass loss    Fluid Accumulation:  1 - Mild to Moderate Extremities,Generalized (extremity edema d/t injury)   Strength:  Not Performed    Estimated Daily Nutrient Needs:  Energy (kcal):  8944-8091 kcal/day; Weight Used for Energy Requirements:  Current     Protein (g):  100 g pro/day; Weight Used for Protein Requirements:  Ideal (1.5)        Fluid (ml/day):  per MD     Nutrition Related Findings:  meds/labs reviewed.       Wounds:  Surgical Incision (bilateral legs)       Current Nutrition Therapies:    Diet NPO Exceptions are: Sips of Water with Meds  ADULT TUBE FEEDING; Nasogastric; Immune Enhancing; Continuous; 25; No; 30; Q 4 hours  Current Tube Feeding (TF) Orders:  · Feeding Route: Nasogastric  · Formula: Immune Enhancing  · Schedule: Continuous --to start at 25 mL/hr today   · Current TF & Flush Orders Provides: 25 mL/hr =900 kcal and 56 g pro/day  · Goal TF & Flush Orders Provides: 45 mL/hr =1620 kcal and 101 g pro/day    Additional Calorie Sources:   none    Anthropometric Measures:  · Height: 5' 9\" (175.3 cm)  · Current Body Weight: 260 lb (117.9 kg) (Estimated)  · Admission Body Weight: 260 lb (117.9 kg) (Estimated)      · Ideal Body Weight: 145 lbs; % Ideal Body Weight 179.3 %   · BMI: 38.4  · BMI Categories: Obese Class 2 (BMI 35.0 -39.9)       Nutrition Diagnosis:   · Inadequate oral intake related to impaired respiratory function,acute injury/trauma as evidenced by NPO or clear liquid status due to medical condition, need for enteral nutrition support    Nutrition Interventions:   Food and/or Nutrient Delivery: Plan to start tube feeding at 25 mL/hr today. When able to advance rate, suggest goal of 45 mL/hr to provide 1620 kcal and 101 g pro/day. Nutrition Education/Counseling:  No recommendation at this time   Coordination of Nutrition Care:  Continue to monitor while inpatient    Goals:  meet % of estimated nutrient needs       Nutrition Monitoring and Evaluation:   Behavioral-Environmental Outcomes:  None Identified   Food/Nutrient Intake Outcomes:  Enteral Nutrition Intake/Tolerance  Physical Signs/Symptoms Outcomes:  Biochemical Data,Nutrition Focused Physical Findings,Skin,Weight     Discharge Planning:     Too soon to determine     Electronically signed by Katarzyna Randolph RD, LD on 2/1/22 at 12:07 PM EST    Contact: [phone]

## 2022-02-01 NOTE — PROGRESS NOTES
ICU PROGRESS NOTE        PATIENT NAME: Felix Arevalo RECORD NO. [id number]  DATE: 2/1/2022    PRIMARY CARE PHYSICIAN: Lupillo Lomax,     HD: # 2    ASSESSMENT    Patient Active Problem List   Diagnosis    Fall at home, initial encounter    Type III open fracture of both tibias     43-year-old female who suffered bilateral open tib/fib fractures after trying to get out of bed. She was initially admitted to the trauma floor, however she went to the operating room with orthopedic surgery yesterday for IMN placement bilaterally and was unable to be extubated due to history of sleep apnea and concern for possible reintubation. Overnight, the patient was having intermittent hypotension, ultimately requiring levophed to maintain maps greater than 65. Currently in A Fib w/ RVR    MEDICAL DECISION MAKING AND PLAN  1. Neuro:  1. Sedation-propofol and fentanyl; this morning, propofol turned off and fentanyl to 100  1. Plan to change to precedex due to hypotension  2. Pain regimen-Tylenol, Neurontin  3. Medical history of neuropathy, on Lyrica at home  4. Opens eyes to verbal, follows commands  2. CV  1. HR   2. MAP 57-77  3. Levo and Vaso due to hypotension  4. History of atrial fibrillation, BNP 1700 this morning  5. Cardiology consulted- A Fib w RVR  1. Digoxin 125mg q2h x2  2. Echo ordered  3. Pulm  1. PF ratio 293  2. Intubated, PRVC FiO2 30% rate 16, PEEP 8, tidal volume 470  3. ABG: pH 7.415, PCO2 40.8, PO2 88.3  4. GI/Nutrition  1. Tube feeds ordered  5. Renal/lytes  1. BUN/creatinine 36/1.79 (33/1.53)  2. Na/K 136/4.1 (136/4.2)  3. IV fluids-normal saline at 75 cc/h  6. Heme  1. DVT prophylaxis- Heparin  2. Received 1 unit PRBC postoperatively  3. Hemoglobin 8.0 (7.4)  7. Endocrine        1. Blood glucose 114-151        2. Not requiring insulin  7. Musculoskeletal  1. Bilateral open tib-fib fractures  2. POD1 status post IMN placement w/ ortho  3. WBAT  4.  Ancef 2 g every 8 hours postoperatively, last dose  at 0900  8. Skin  1. Turning per ICU protocol  9. Micro  1. WBC 15.1 (11.2)  2. Afebrile  10. Family/dispo  1. Will update daughter  2. Initially when I discussed the patient with the daughter, the daughter told me CODE STATUS was DNR CCA, however the patient is currently listed as full code. Will clarify with family today. 3. Hope for extubation and transfer to stepdown unit today  11. Lines  1. ETT, OG, right radial arterial line, lateral peripheral IVs, Tobar catheter    CHECKLIST    CAM-ICU RASS: -1  RESTRAINTS: Bilateral soft wrist  IVF: Normal saline at 75 cc/h  NUTRITION: Will start tube feeds  ANTIBIOTICS: Ancef postoperatively  GI: Pepcid  DVT: Heparin  GLYCEMIC CONTROL: Not requiring insulin  HOB >45: N/A  MOBILITY: Bedridden currently, will have PT OT work with when extubated  SBT: Yes  IS: N/A  Wound care: N/A    Chief Complaint: \" Intubated\"    SUBJECTIVE    [place] Bound was seen and examined this morning. She was transferred to the ICU postoperatively on  due to concerns with extubation given the patient's medical history of sleep apnea. She remained intubated overnight with some low blood pressures while on propofol and fentanyl for sedation, requiring Levophed to maintain maps. Postoperative hemoglobin 7.4, requiring 1 unit PRBC. This morning hemoglobin 8. Patient in A Fib w/ RVR, hypotensive.        OBJECTIVE  VITALS: Temp: Temp: 98.6 °F (37 °C)Temp  Av.9 °F (37.2 °C)  Min: 98.2 °F (36.8 °C)  Max: 100 °F (20.0 °C) BP Systolic (27GCI), CRA:437 , Min:63 , NBO:758   Diastolic (39GEP), RJB:81, Min:26, Max:144   Pulse Pulse  Av  Min: 92  Max: 127 Resp Resp  Av  Min: 0  Max: 30 Pulse ox SpO2  Av.2 %  Min: 83 %  Max: 100 %    CONSTITUTIONAL: Intubated, sedated  HEENT: normocephalic, atraumatic, PERRLA  LUNGS: CTAB  CV: A Fib w/ RVR; no m/r/g  GI: Abd soft, nondistended, nontender   MUSCULOSKELETAL: Post-surgical dressings intact to hardware complication. Comminuted fracture of the distal fibular diaphysis with improved alignment. Soft tissue swelling. XR CHEST PORTABLE   Final Result   Minimal left basilar atelectasis. Otherwise, clear lungs. Cardiomegaly. Small left pleural effusion. No pneumothorax. The endotracheal tube is positioned appropriately above the elizabeth and below   the clavicular heads. The esophageal pH probe is likely positioned within   the [id number]-[id number] esophagus just proximal to the hiatal hernia and the distal   tip and proximal side hole of the nasogastric tube are positioned within the   lower left chest, at or just above the level of the left hemidiaphragm and   likely within the hiatal hernia. FLUORO FOR SURGICAL PROCEDURES   Final Result      FLUORO FOR SURGICAL PROCEDURES   Final Result      XR SHOULDER RIGHT (MIN 2 VIEWS)   Final Result   Degenerate change without evidence acute fracture dislocation. XR KNEE RIGHT (3 VIEWS)   Preliminary Result   Right knee arthroplasty with intact hardware. XR ANKLE RIGHT (MIN 3 VIEWS)   Preliminary Result   1. Acute displaced fracture of the left distal tibial diaphysis with improved   alignment. 2. Comminuted and displaced fracture of the left distal fibular diaphysis,   alignment grossly unchanged. 3. Nondisplaced fracture of the tip of the left lateral malleolus, better   evaluated on prior CT. 4. Cast material is in place on the left. 5. Acute displaced fracture of the right distal tibial diaphysis, alignment   grossly unchanged. 6. Acute segmental fracture of the mid/distal right fibula, alignment grossly   unchanged. 7. Right knee arthroplasty with intact hardware. 8. Cast material in place on the right. XR KNEE RIGHT (3 VIEWS)   Final Result   Total knee arthropasty without acute hardware complication. XR TIBIA FIBULA RIGHT (2 VIEWS)   Preliminary Result   1.  Acute displaced fracture of the left distal tibial diaphysis with improved   alignment. 2. Comminuted and displaced fracture of the left distal fibular diaphysis,   alignment grossly unchanged. 3. Nondisplaced fracture of the tip of the left lateral malleolus, better   evaluated on prior CT. 4. Cast material is in place on the left. 5. Acute displaced fracture of the right distal tibial diaphysis, alignment   grossly unchanged. 6. Acute segmental fracture of the mid/distal right fibula, alignment grossly   unchanged. 7. Right knee arthroplasty with intact hardware. 8. Cast material in place on the right. XR FOOT RIGHT (MIN 3 VIEWS)   Preliminary Result   1. Acute displaced fracture of the left distal tibial diaphysis with improved   alignment. 2. Comminuted and displaced fracture of the left distal fibular diaphysis,   alignment grossly unchanged. 3. Nondisplaced fracture of the tip of the left lateral malleolus, better   evaluated on prior CT. 4. Cast material is in place on the left. 5. Acute displaced fracture of the right distal tibial diaphysis, alignment   grossly unchanged. 6. Acute segmental fracture of the mid/distal right fibula, alignment grossly   unchanged. 7. Right knee arthroplasty with intact hardware. 8. Cast material in place on the right. XR ANKLE LEFT (MIN 3 VIEWS)   Final Result   Interval placement of back slab cast.  Fractures again identified of the   tibia and fibula as discussed above. There is now posterior displacement of distal tibial fragment by almost half   a shaft with whereas previously it was anteriorly displaced. XR TIBIA FIBULA LEFT (2 VIEWS)   Preliminary Result   1. Acute displaced fracture of the left distal tibial diaphysis with improved   alignment. 2. Comminuted and displaced fracture of the left distal fibular diaphysis,   alignment grossly unchanged.    3. Nondisplaced fracture of the tip of the left lateral malleolus, better   evaluated on prior CT.   4. Cast material is in place on the left. 5. Acute displaced fracture of the right distal tibial diaphysis, alignment   grossly unchanged. 6. Acute segmental fracture of the mid/distal right fibula, alignment grossly   unchanged. 7. Right knee arthroplasty with intact hardware. 8. Cast material in place on the right. XR FOOT LEFT (MIN 3 VIEWS)   Preliminary Result   1. Acute displaced fracture of the left distal tibial diaphysis with improved   alignment. 2. Comminuted and displaced fracture of the left distal fibular diaphysis,   alignment grossly unchanged. 3. Nondisplaced fracture of the tip of the left lateral malleolus, better   evaluated on prior CT. 4. Cast material is in place on the left. 5. Acute displaced fracture of the right distal tibial diaphysis, alignment   grossly unchanged. 6. Acute segmental fracture of the mid/distal right fibula, alignment grossly   unchanged. 7. Right knee arthroplasty with intact hardware. 8. Cast material in place on the right. CT TIBIA FIBULA RIGHT WO CONTRAST   Final Result   CT right tibia and fibula:      1. Acute and displaced fracture of the distal diaphysis of the right tibia. 2. Acute segmental fracture of the mid diaphysis of the right fibula. 3. Tissue edema, subcutaneous gas, and anterior laceration consistent with   open fractures. CT left tibia and fibula:      1. Acute and displaced fractures of the distal diaphysis of the left tibia   and fibula with surrounding soft tissue edema and gas consistent with open   fractures. 2. Nondisplaced fracture of the tip of the left lateral malleolus. 3. Osteopenia. CT TIBIA FIBULA LEFT WO CONTRAST   Final Result   CT right tibia and fibula:      1. Acute and displaced fracture of the distal diaphysis of the right tibia. 2. Acute segmental fracture of the mid diaphysis of the right fibula.    3. Tissue edema, subcutaneous gas, and anterior laceration consistent with   open fractures. CT left tibia and fibula:      1. Acute and displaced fractures of the distal diaphysis of the left tibia   and fibula with surrounding soft tissue edema and gas consistent with open   fractures. 2. Nondisplaced fracture of the tip of the left lateral malleolus. 3. Osteopenia. CT LUMBAR SPINE TRAUMA RECONSTRUCTION   Final Result   CHEST ABDOMEN PELVIS      1. No acute visceral injury. 2. Cholelithiasis. 3. Large sliding hiatal hernia. THORACIC AND LUMBAR SPINE:      1. No acute fracture. 2. Moderate degenerative changes. 3. Multilevel lumbar laminectomies. CT THORACIC SPINE TRAUMA RECONSTRUCTION   Final Result   CHEST ABDOMEN PELVIS      1. No acute visceral injury. 2. Cholelithiasis. 3. Large sliding hiatal hernia. THORACIC AND LUMBAR SPINE:      1. No acute fracture. 2. Moderate degenerative changes. 3. Multilevel lumbar laminectomies. CT CHEST ABDOMEN PELVIS WO CONTRAST   Final Result   CHEST ABDOMEN PELVIS      1. No acute visceral injury. 2. Cholelithiasis. 3. Large sliding hiatal hernia. THORACIC AND LUMBAR SPINE:      1. No acute fracture. 2. Moderate degenerative changes. 3. Multilevel lumbar laminectomies. CT CERVICAL SPINE WO CONTRAST   Final Result   No acute intracranial abnormalities are noted. Multilevel cervical spondylosis and degenerative disc disease. Mild to moderate spinal stenosis at C5-6 and C6-7      Probable chronic fracture of C6 spinous process. Please correlate clinically      RECOMMENDATIONS:   Further evaluation of the cervical spine should be obtained with MR imaging   if clinically indicated. CT HEAD WO CONTRAST   Final Result   No acute intracranial abnormalities are noted. Multilevel cervical spondylosis and degenerative disc disease. Mild to moderate spinal stenosis at C5-6 and C6-7      Probable chronic fracture of C6 spinous process.   Please correlate clinically RECOMMENDATIONS:   Further evaluation of the cervical spine should be obtained with MR imaging   if clinically indicated. XR ANKLE LEFT (2 VIEWS)   Final Result   Single lateral view of left ankle: Imaging from proximal tibial meta   diaphysis through the ankle. Transversely oriented fracture distal tibia and   fibular metadiaphysis at roughly the same level about 7 cm proximal to   tibiotalar joint. Anterior displacement of distal fibular fragment by almost   after shaft width. Lesser degree of displacement of distal tibial fragment. Ankle joint appears intact on this single lateral view. Soft tissue swelling   lower leg/ankle. Single lateral view right ankle: Imaging from proximal tibial metadiaphysis   through the ankle. There is distal tibial fracture close to the metaphysis   about 7 cm above the tibiotalar joint. Distal fragment displaced anteriorly   by almost half a full shaft width. There is comminuted fibular fracture at a   slightly cephalad level with mild displacement. Plate and screws in the   fibula. 2 tibial screws. RECOMMENDATION:   1. Distal left tibial and fibular metadiaphyseal fractures at about the same   level. There is anterior displacement of distal fragments as discussed above. 2. Displaced distal tibia and fibula fractures. Tibial fracture at a lower   level than fibula fracture. Distal tibial fragment displaced almost full   shaft with anteriorly. Fibula fracture comminuted. Plate and screws in the   distal tibia fibular as discussed above. XR ANKLE RIGHT (2 VIEWS)   Final Result   Single lateral view of left ankle: Imaging from proximal tibial meta   diaphysis through the ankle. Transversely oriented fracture distal tibia and   fibular metadiaphysis at roughly the same level about 7 cm proximal to   tibiotalar joint. Anterior displacement of distal fibular fragment by almost   after shaft width.   Lesser degree of displacement of distal tibial fragment. Ankle joint appears intact on this single lateral view. Soft tissue swelling   lower leg/ankle. Single lateral view right ankle: Imaging from proximal tibial metadiaphysis   through the ankle. There is distal tibial fracture close to the metaphysis   about 7 cm above the tibiotalar joint. Distal fragment displaced anteriorly   by almost half a full shaft width. There is comminuted fibular fracture at a   slightly cephalad level with mild displacement. Plate and screws in the   fibula. 2 tibial screws. RECOMMENDATION:   1. Distal left tibial and fibular metadiaphyseal fractures at about the same   level. There is anterior displacement of distal fragments as discussed above. 2. Displaced distal tibia and fibula fractures. Tibial fracture at a lower   level than fibula fracture. Distal tibial fragment displaced almost full   shaft with anteriorly. Fibula fracture comminuted. Plate and screws in the   distal tibia fibular as discussed above.                Celestina Irizarry MD  2/1/22, 6:54 AM

## 2022-02-01 NOTE — FLOWSHEET NOTE
Late entry;  1230: increasing need for vasopressor support. RN checked 22g in left forearm, ecchymosis surrounding IV and feels firm, no blood return. Suspect levophed infiltrated. Switch levophed to right AC 18g IV with good blood return and blood pressure is responding.  at bedside for PIV unsuccessful. Ultrasound 18g IV placed by Luh Garcia RN in left upper arm with good blood return. Levophed placed into that line. 1630: increasing need for vasopressor support, Left upper arm 18g has no blood return at this time, flushes easily. Levophed changed over to right ac 18g and blood pressure is responsive. RN notified Jethro Soriano and Jimi Chan. No plans for CVC at this time. Attempt another PIV under ultrasound. Infiltrated IV site treated with regitine and warm dry compress.        Electronically signed by Kailey Rankin RN on 2/1/2022 at 5:46 PM

## 2022-02-01 NOTE — CARE COORDINATION
Case Management Initial Discharge Plan  Niki Zarco,             Met with:family member patient's daughter Deneen Quinn and granddaughter Rohit Heath  to discuss discharge plans. Patient is currently intubated/sedated  Information verified: address, contacts, phone number, , insurance Yes  Insurance Provider: [employer]    Emergency Contact/Next of Clara Bryan name & number: Patient's daughter Giselle Hugo, primary decision maker ([phone])  Who are involved in patient's support system? Family, Muslim    PCP: Cuong Cruz DO  Date of last visit: 3 weeks ago      Discharge Planning    Living Arrangements:  Alone     Home has 1 stories  3+1 stairs to climb to get into front door, 0 stairs to climb to reach second floor  Location of bedroom/bathroom in home Main    Patient able to perform ADL's:Assisted. Pt able to perform small tasks, simple meal prep and getting to BR. Family assists with most meals/cleaning/bathing    Current Services (outpatient & in home) Meals On Wheels daily, [employer] provided meals 3x/week  DME equipment: Hospital Bed, [facility], Has Wheeled Walker and Rollator, has Grab Bars and Raised Toilet Seat, Bi-Pap  DME provider: Bi-Pap/O2 (night time only) per  [address]    Is patient receiving oral anticoagulation therapy? No    If indicated:   Physician managing anticoagulation treatment:   Where does patient obtain lab work for ATC treatment? Potential Assistance Needed:  [facility]    Patient agreeable to home care: Yes  Freedom of choice provided:  yes    Prior SNF/Rehab Placement and Facility: [address] (willing to return to these two facilities), [facility] (unwilling to return to these two facilities).     Agreeable to SNF/Rehab: Yes  [place] of choice provided: yes     Evaluation: no    Expected Discharge date:   (Unknown)    Patient expects to be discharged to:   SNF    If home: is the family and/or caregiver wiling & able to provide support at home? Who will be providing this support? Follow Up Appointment: Best Day/ Time:  (unknown)    Transportation provider: Family  Transportation arrangements needed for discharge: Yes    Readmission Risk              Risk of Unplanned Readmission:  13             Does patient have a readmission risk score greater than 14?: No  If yes, follow-up appointment must be made within 7 days of discharge. Goals of Care: Safety, increased level of comfort      Educated patient's family on transitional options, provided freedom of choice and are agreeable with plan      Discharge Plan:  Patient is currently I/S.  SNF vs LTACH if any issues with extubation. Family would like referrals (in this order) to 1. [facility] 2. [facility] 3. [facility]    Referral placed to [facility].   Will monitor for ongoing needs        Electronically signed by Rita Armstrong RN on 2/1/22 at 12:00 PM EST

## 2022-02-01 NOTE — PLAN OF CARE
Problem: Pain:  Description: Pain management should include both nonpharmacologic and pharmacologic interventions.   Goal: Pain level will decrease  Description: Pain level will decrease  2/1/2022 1830 by Cheyanne Cordero RN  Outcome: Ongoing  2/1/2022 0817 by Marbella Berman RN  Outcome: Ongoing  Goal: Control of acute pain  Description: Control of acute pain  2/1/2022 1830 by Cheyanne Cordero RN  Outcome: Ongoing  2/1/2022 0817 by Marbella Berman RN  Outcome: Ongoing  Goal: Control of chronic pain  Description: Control of chronic pain  2/1/2022 1830 by Cheyanne Cordero RN  Outcome: Ongoing  2/1/2022 0817 by Mrabella Berman RN  Outcome: Ongoing     Problem: Falls - Risk of:  Goal: Will remain free from falls  Description: Will remain free from falls  2/1/2022 1830 by Cheyanne Cordero RN  Outcome: Ongoing  2/1/2022 0817 by Marbella Berman RN  Outcome: Ongoing  Goal: Absence of physical injury  Description: Absence of physical injury  2/1/2022 1830 by Cheyanne Cordero RN  Outcome: Ongoing  2/1/2022 0817 by Marbella Berman RN  Outcome: Ongoing     Problem: Skin Integrity:  Goal: Will show no infection signs and symptoms  Description: Will show no infection signs and symptoms  2/1/2022 1830 by Cheyanne Cordero RN  Outcome: Ongoing  2/1/2022 0817 by Marbella Berman RN  Outcome: Ongoing  Goal: Absence of new skin breakdown  Description: Absence of new skin breakdown  2/1/2022 1830 by Cheyanne Cordero RN  Outcome: Ongoing  2/1/2022 0817 by Marbella Berman RN  Outcome: Ongoing     Problem: OXYGENATION/RESPIRATORY FUNCTION  Goal: Patient will maintain patent airway  2/1/2022 1830 by Cheyanne Cordero RN  Outcome: Ongoing  2/1/2022 0817 by Marbella Berman RN  Outcome: Ongoing  Goal: Patient will achieve/maintain normal respiratory rate/effort  Description: Respiratory rate and effort will be within normal limits for the patient  2/1/2022 1830 by Cheyanne Cordero RN  Outcome: Ongoing  2/1/2022 0817 by April Hurtado RN  Outcome: Ongoing     Problem: MECHANICAL VENTILATION  Goal: Patient will maintain patent airway  2/1/2022 1830 by Eliane Riggs RN  Outcome: Ongoing  2/1/2022 0817 by April Hurtado RN  Outcome: Ongoing  Goal: Oral health is maintained or improved  2/1/2022 1830 by Eliane Riggs RN  Outcome: Ongoing  2/1/2022 0817 by April Hurtado RN  Outcome: Ongoing  Goal: ET tube will be managed safely  2/1/2022 1830 by Eliane Riggs RN  Outcome: Ongoing  2/1/2022 0817 by April Hurtado RN  Outcome: Ongoing  Goal: Ability to express needs and understand communication  2/1/2022 1830 by Eliane Riggs RN  Outcome: Ongoing  2/1/2022 0817 by April Hurtado RN  Outcome: Ongoing  Goal: Mobility/activity is maintained at optimum level for patient  2/1/2022 1830 by Eliane Riggs RN  Outcome: Ongoing  2/1/2022 0817 by April Hurtado RN  Outcome: Ongoing     Problem: SKIN INTEGRITY  Goal: Skin integrity is maintained or improved  2/1/2022 1830 by Eliane Riggs RN  Outcome: Ongoing  2/1/2022 0817 by April Hurtado RN  Outcome: Ongoing     Problem: Non-Violent Restraints  Goal: Removal from restraints as soon as assessed to be safe  2/1/2022 1830 by Eliane Riggs RN  Outcome: Ongoing  2/1/2022 0817 by April Hurtado RN  Outcome: Ongoing  Goal: No harm/injury to patient while restraints in use  2/1/2022 1830 by Eliane Riggs RN  Outcome: Ongoing  2/1/2022 0817 by April Hurtado RN  Outcome: Ongoing  Goal: Patient's dignity will be maintained  2/1/2022 1830 by Eliane Riggs RN  Outcome: Ongoing  2/1/2022 0817 by April Hurtado RN  Outcome: Ongoing     Problem: Nutrition  Goal: Optimal nutrition therapy  2/1/2022 1830 by Eliane Riggs RN  Outcome: Ongoing  2/1/2022 1211 by Ash Li RD, LD  Outcome: Ongoing  Note: Nutrition Problem #1: Inadequate oral intake  Intervention: Food and/or Nutrient Delivery:  (Plan to start tube feeding at 25 mL/hr today.  When able to advance rate, suggest goal of 45 mL/hr to provide 1620 kcal and 101 g pro/day.)  Nutritional Goals: meet % of estimated nutrient needs

## 2022-02-02 NOTE — FLOWSHEET NOTE
Interventional Radiology    Brit D-RT      NG tube advanced past hernia under video floursocopy per JOSE DANIEL Kuo PA  Secured with Saqib on nose    Pt tolerated well

## 2022-02-02 NOTE — PROGRESS NOTES
[facility]. Assisting with the transport was RNx2. Appropriate devices were applied to monitor the patient's condition during transport. Patient transported  via 40% O2 via ventilator. Patient tolerated well. Patient placed on previous settings.     Rachel Hernandez RCP  11:35 AM

## 2022-02-02 NOTE — BRIEF OP NOTE
Brief Postoperative Note for NGT placement    Alcon Lie  YOB: 1933  [id number]    Pre-operative Diagnosis: hiatal hernia    Post-operative Diagnosis: Same    Procedure: Fluoroscopy Guided NGT Placement     Anesthesia: None     Surgeons/Assistants: Rebecca Mcintyre PA-C    Complications: None    EBL: None    Specimens: Were Not Obtained    Description:    Successful placement of 12 [place] [place] sump  nasogastric tube through the left nostril. Nasogatric tube secured. Placement confirmed with administration of air bolus under fluoroscopy. Okay to use NGT.     Electronically signed by BRAULIO Molina on 2/2/2022 at 9:01 AM

## 2022-02-02 NOTE — PROGRESS NOTES
ICU PROGRESS NOTE        PATIENT NAME: [address] RECORD NO. [id number]  DATE: 2/2/2022    PRIMARY CARE PHYSICIAN: Emilee Aguiar DO    HD: # 3    ASSESSMENT    Patient Active Problem List   Diagnosis    Fall at home, initial encounter    Type III open fracture of both tibias     77-year-old female who suffered bilateral open tib/fib fractures after trying to get out of bed. She was initially admitted to the trauma floor, however she went to the operating room with orthopedic surgery yesterday for IMN placement bilaterally and was unable to be extubated due to history of sleep apnea and concern for possible reintubation. Overnight, the patient continues to require levophed to maintain adequate MAPs. She opens her eyes and follows commands. MEDICAL DECISION MAKING AND PLAN  Neuro:  Sedation-Precedex at 0.3 mcg/kg/hr, fentanyl gtt @ 100  Pain regimen-Tylenol, Neurontin, fentanyl gtt  Medical history of neuropathy, on Lyrica at home  Opens eyes to verbal, follows commands  CV  HR   MAP 71-82  Levo @ 10  History of atrial fibrillation  Cardiology consulted- A Fib w RVR  Digoxin 125mg q2h x2 on 2/1  Echo 2/1- LVEF > 65%  Pulm  PF ratio 310  Intubated, PRVC FiO2 30% rate 16, PEEP 8, tidal volume 470  ABG: pH 7.412, PCO2 39.0, PO2 93.4 bicarb 24.8  GI/Nutrition  Unable to adequately place NG/OG due to large hiatal hernia  To go to IR today for NG placement  Will start tube feeds when NG placed  Renal/lytes  BUN/creatinine 36/1.75 (36/1.79)  Na/K 135/3.8 (136/4.1)  IV fluids-normal saline at 75 cc/h  UOP- 0.5 cc/kg/hr  Tobar in place  Heme  DVT prophylaxis- Heparin  Hemoglobin 7.6 (8.0)  7. Endocrine        1. Blood glucose 151-181        2.   Not requiring insulin at this time  Musculoskeletal  Bilateral open tib-fib fractures  POD2 status post IMN placement w/ ortho  WBAT  Skin  Turning per ICU protocol  Micro  WBC 14.4 (15.1)  Afebrile  Family/dispo  Will update daughter  Plan to wean pressors/vent  Lines  ETT, NG, right radial arterial line, lateral peripheral IVs, Tobar catheter    CHECKLIST    CAM-ICU RASS: -1  RESTRAINTS: Bilateral soft wrist  IVF: Normal saline at 75 cc/h  NUTRITION: Will start tube feeds  ANTIBIOTICS: none  GI: Pepcid  DVT: Heparin  GLYCEMIC CONTROL: Not requiring insulin  HOB >45: N/A  MOBILITY: Bedridden currently, will have PT OT work with when extubated  SBT: Yes  IS: N/A  Wound care: N/A    Chief Complaint: \" Intubated\"    SUBJECTIVE    Yamil Morillo was seen and examined this morning. She was transferred to the ICU postoperatively on  due to concerns with extubation given the patient's medical history of sleep apnea. She remained intubated overnight with some low blood pressures while on propofol and fentanyl for sedation, requiring Levophed to maintain maps. Postoperative hemoglobin 7.4, requiring 1 unit PRBC. This morning hemoglobin 8. Patient in A Fib w/ RVR, hypotensive.        OBJECTIVE  VITALS: Temp: Temp: 98.9 °F (37.2 °C)Temp  Av.9 °F (37.2 °C)  Min: 98.5 °F (36.9 °C)  Max: 99.4 °F (48.7 °C) BP Systolic (36GYG), FM , Min:83 , OSC:542   Diastolic (45VXX), DANG:19, Min:34, Max:85   Pulse Pulse  Av.9  Min: 90  Max: 136 Resp Resp  Av  Min: 12  Max: 25 Pulse ox SpO2  Av.5 %  Min: 91 %  Max: 98 %    CONSTITUTIONAL: Intubated, sedated  HEENT: normocephalic, atraumatic, PERRLA  LUNGS: CTAB  CV: A Fib w/ RVR; no m/r/g  GI: Abd soft, nondistended, nontender   MUSCULOSKELETAL: Post-surgical dressings intact to bilateral lower extremities; clean/dry/intact  NEUROLOGIC: GCS 9T    LAB:  CBC:   Recent Labs     228 22  0415 22  0536   WBC 11.2 15.1* 14.4*   HGB 7.4* 8.0* 7.6*   HCT 22.2* 24.3* 23.0*   MCV 94.9 94.2 94.3   PLT See Reflexed IPF Result 132* 190     BMP:   Recent Labs     22  0415 22  0536    136 135   K 4.2 4.1 3.8    99 100   CO2 24 23 23   BUN 33* 36* 36* CREATININE 1.53* 1.79* 1.75*   GLUCOSE 135* 151* 181*         RADIOLOGY:  CXR:   XR CHEST PORTABLE   Final Result   Cardiomegaly with left basilar effusion and bilateral mid and lower lung   infiltrates. XR ABDOMEN FOR NG/OG/NE TUBE PLACEMENT   Preliminary Result   Since the most recent comparison exam the nasogastric tube has been advanced   with its tip now projecting in the left lower chest within a known hiatal   hernia. The findings were sent to the Radiology Results [address] at 5:40   pm on 2/1/2022to be communicated to a licensed caregiver. XR CHEST PORTABLE   Preliminary Result   Since the most recent comparison exam the nasogastric tube has been advanced   with its tip now projecting in the left lower chest within a known hiatal   hernia. The findings were sent to the Radiology Results [address] at 5:40   pm on 2/1/2022to be communicated to a licensed caregiver. XR ABDOMEN FOR NG/OG/NE TUBE PLACEMENT   Final Result   NG tube distal tip projects at the level of mid to distal esophagus. The findings were sent to the Radiology Results [address] at 4:40   pm on 2/1/2022to be communicated to a licensed caregiver. XR CHEST PORTABLE   Final Result   1. Support devices as above. 2. Otherwise, no significant change in the appearance of the chest.         XR ABDOMEN FOR NG/OG/NE TUBE PLACEMENT   Final Result   Gastric tube within a hiatal hernia. XR ABDOMEN FOR NG/OG/NE TUBE PLACEMENT   Final Result   Gastric tube within a hiatal hernia. XR TIBIA FIBULA LEFT (2 VIEWS)   Final Result   Hardware fixation of the distal tibial fracture without complication. Comminuted fracture of the distal fibula with improved alignment. Soft tissue swelling. XR ANKLE LEFT (MIN 3 VIEWS)   Final Result   Hardware fixation of the distal tibial fracture without complication.       Comminuted distal fibular fracture with improved alignment. Soft tissue swelling. XR TIBIA FIBULA RIGHT (2 VIEWS)   Final Result   Hardware fixation of the distal tibial fracture with intramedullary cielo and   no evidence for complication. Comminuted fracture of the distal fibula diaphysis with improved alignment. Soft tissue swelling. XR ANKLE RIGHT (MIN 3 VIEWS)   Final Result   Hardware fixation of the distal tibial fracture without hardware complication. Comminuted fracture of the distal fibular diaphysis with improved alignment. Soft tissue swelling. XR CHEST PORTABLE   Final Result   Minimal left basilar atelectasis. Otherwise, clear lungs. Cardiomegaly. Small left pleural effusion. No pneumothorax. The endotracheal tube is positioned appropriately above the elizabeth and below   the clavicular heads. The esophageal pH probe is likely positioned within   the [id number] esophagus just proximal to the hiatal hernia and the distal   tip and proximal side hole of the nasogastric tube are positioned within the   lower left chest, at or just above the level of the left hemidiaphragm and   likely within the hiatal hernia. FLUORO FOR SURGICAL PROCEDURES   Final Result      FLUORO FOR SURGICAL PROCEDURES   Final Result      XR SHOULDER RIGHT (MIN 2 VIEWS)   Final Result   Degenerate change without evidence acute fracture dislocation. XR KNEE RIGHT (3 VIEWS)   Preliminary Result   Right knee arthroplasty with intact hardware. XR ANKLE RIGHT (MIN 3 VIEWS)   Preliminary Result   1. Acute displaced fracture of the left distal tibial diaphysis with improved   alignment. 2. Comminuted and displaced fracture of the left distal fibular diaphysis,   alignment grossly unchanged. 3. Nondisplaced fracture of the tip of the left lateral malleolus, better   evaluated on prior CT. 4. Cast material is in place on the left.    5. Acute displaced fracture of the right distal tibial diaphysis, alignment   grossly unchanged. 6. Acute segmental fracture of the mid/distal right fibula, alignment grossly   unchanged. 7. Right knee arthroplasty with intact hardware. 8. Cast material in place on the right. XR KNEE RIGHT (3 VIEWS)   Final Result   Total knee arthropasty without acute hardware complication. XR TIBIA FIBULA RIGHT (2 VIEWS)   Preliminary Result   1. Acute displaced fracture of the left distal tibial diaphysis with improved   alignment. 2. Comminuted and displaced fracture of the left distal fibular diaphysis,   alignment grossly unchanged. 3. Nondisplaced fracture of the tip of the left lateral malleolus, better   evaluated on prior CT. 4. Cast material is in place on the left. 5. Acute displaced fracture of the right distal tibial diaphysis, alignment   grossly unchanged. 6. Acute segmental fracture of the mid/distal right fibula, alignment grossly   unchanged. 7. Right knee arthroplasty with intact hardware. 8. Cast material in place on the right. XR FOOT RIGHT (MIN 3 VIEWS)   Preliminary Result   1. Acute displaced fracture of the left distal tibial diaphysis with improved   alignment. 2. Comminuted and displaced fracture of the left distal fibular diaphysis,   alignment grossly unchanged. 3. Nondisplaced fracture of the tip of the left lateral malleolus, better   evaluated on prior CT. 4. Cast material is in place on the left. 5. Acute displaced fracture of the right distal tibial diaphysis, alignment   grossly unchanged. 6. Acute segmental fracture of the mid/distal right fibula, alignment grossly   unchanged. 7. Right knee arthroplasty with intact hardware. 8. Cast material in place on the right. XR ANKLE LEFT (MIN 3 VIEWS)   Final Result   Interval placement of back slab cast.  Fractures again identified of the   tibia and fibula as discussed above.       There is now posterior displacement of distal tibial fragment by almost half   a shaft with whereas previously it was anteriorly displaced. XR TIBIA FIBULA LEFT (2 VIEWS)   Preliminary Result   1. Acute displaced fracture of the left distal tibial diaphysis with improved   alignment. 2. Comminuted and displaced fracture of the left distal fibular diaphysis,   alignment grossly unchanged. 3. Nondisplaced fracture of the tip of the left lateral malleolus, better   evaluated on prior CT. 4. Cast material is in place on the left. 5. Acute displaced fracture of the right distal tibial diaphysis, alignment   grossly unchanged. 6. Acute segmental fracture of the mid/distal right fibula, alignment grossly   unchanged. 7. Right knee arthroplasty with intact hardware. 8. Cast material in place on the right. XR FOOT LEFT (MIN 3 VIEWS)   Preliminary Result   1. Acute displaced fracture of the left distal tibial diaphysis with improved   alignment. 2. Comminuted and displaced fracture of the left distal fibular diaphysis,   alignment grossly unchanged. 3. Nondisplaced fracture of the tip of the left lateral malleolus, better   evaluated on prior CT. 4. Cast material is in place on the left. 5. Acute displaced fracture of the right distal tibial diaphysis, alignment   grossly unchanged. 6. Acute segmental fracture of the mid/distal right fibula, alignment grossly   unchanged. 7. Right knee arthroplasty with intact hardware. 8. Cast material in place on the right. CT TIBIA FIBULA RIGHT WO CONTRAST   Final Result   CT right tibia and fibula:      1. Acute and displaced fracture of the distal diaphysis of the right tibia. 2. Acute segmental fracture of the mid diaphysis of the right fibula. 3. Tissue edema, subcutaneous gas, and anterior laceration consistent with   open fractures. CT left tibia and fibula:      1.  Acute and displaced fractures of the distal diaphysis of the left tibia   and fibula with surrounding soft tissue edema and gas consistent with open   fractures. 2. Nondisplaced fracture of the tip of the left lateral malleolus. 3. Osteopenia. CT TIBIA FIBULA LEFT WO CONTRAST   Final Result   CT right tibia and fibula:      1. Acute and displaced fracture of the distal diaphysis of the right tibia. 2. Acute segmental fracture of the mid diaphysis of the right fibula. 3. Tissue edema, subcutaneous gas, and anterior laceration consistent with   open fractures. CT left tibia and fibula:      1. Acute and displaced fractures of the distal diaphysis of the left tibia   and fibula with surrounding soft tissue edema and gas consistent with open   fractures. 2. Nondisplaced fracture of the tip of the left lateral malleolus. 3. Osteopenia. CT LUMBAR SPINE TRAUMA RECONSTRUCTION   Final Result   CHEST ABDOMEN PELVIS      1. No acute visceral injury. 2. Cholelithiasis. 3. Large sliding hiatal hernia. THORACIC AND LUMBAR SPINE:      1. No acute fracture. 2. Moderate degenerative changes. 3. Multilevel lumbar laminectomies. CT THORACIC SPINE TRAUMA RECONSTRUCTION   Final Result   CHEST ABDOMEN PELVIS      1. No acute visceral injury. 2. Cholelithiasis. 3. Large sliding hiatal hernia. THORACIC AND LUMBAR SPINE:      1. No acute fracture. 2. Moderate degenerative changes. 3. Multilevel lumbar laminectomies. CT CHEST ABDOMEN PELVIS WO CONTRAST   Final Result   CHEST ABDOMEN PELVIS      1. No acute visceral injury. 2. Cholelithiasis. 3. Large sliding hiatal hernia. THORACIC AND LUMBAR SPINE:      1. No acute fracture. 2. Moderate degenerative changes. 3. Multilevel lumbar laminectomies. CT CERVICAL SPINE WO CONTRAST   Final Result   No acute intracranial abnormalities are noted. Multilevel cervical spondylosis and degenerative disc disease. Mild to moderate spinal stenosis at C5-6 and C6-7      Probable chronic fracture of C6 spinous process.   Please correlate clinically      RECOMMENDATIONS:   Further evaluation of the cervical spine should be obtained with MR imaging   if clinically indicated. CT HEAD WO CONTRAST   Final Result   No acute intracranial abnormalities are noted. Multilevel cervical spondylosis and degenerative disc disease. Mild to moderate spinal stenosis at C5-6 and C6-7      Probable chronic fracture of C6 spinous process. Please correlate clinically      RECOMMENDATIONS:   Further evaluation of the cervical spine should be obtained with MR imaging   if clinically indicated. XR ANKLE LEFT (2 VIEWS)   Final Result   Single lateral view of left ankle: Imaging from proximal tibial meta   diaphysis through the ankle. Transversely oriented fracture distal tibia and   fibular metadiaphysis at roughly the same level about 7 cm proximal to   tibiotalar joint. Anterior displacement of distal fibular fragment by almost   after shaft width. Lesser degree of displacement of distal tibial fragment. Ankle joint appears intact on this single lateral view. Soft tissue swelling   lower leg/ankle. Single lateral view right ankle: Imaging from proximal tibial metadiaphysis   through the ankle. There is distal tibial fracture close to the metaphysis   about 7 cm above the tibiotalar joint. Distal fragment displaced anteriorly   by almost half a full shaft width. There is comminuted fibular fracture at a   slightly cephalad level with mild displacement. Plate and screws in the   fibula. 2 tibial screws. RECOMMENDATION:   1. Distal left tibial and fibular metadiaphyseal fractures at about the same   level. There is anterior displacement of distal fragments as discussed above. 2. Displaced distal tibia and fibula fractures. Tibial fracture at a lower   level than fibula fracture. Distal tibial fragment displaced almost full   shaft with anteriorly. Fibula fracture comminuted.   Plate and screws in the   distal tibia fibular as discussed above. XR ANKLE RIGHT (2 VIEWS)   Final Result   Single lateral view of left ankle: Imaging from proximal tibial meta   diaphysis through the ankle. Transversely oriented fracture distal tibia and   fibular metadiaphysis at roughly the same level about 7 cm proximal to   tibiotalar joint. Anterior displacement of distal fibular fragment by almost   after shaft width. Lesser degree of displacement of distal tibial fragment. Ankle joint appears intact on this single lateral view. Soft tissue swelling   lower leg/ankle. Single lateral view right ankle: Imaging from proximal tibial metadiaphysis   through the ankle. There is distal tibial fracture close to the metaphysis   about 7 cm above the tibiotalar joint. Distal fragment displaced anteriorly   by almost half a full shaft width. There is comminuted fibular fracture at a   slightly cephalad level with mild displacement. Plate and screws in the   fibula. 2 tibial screws. RECOMMENDATION:   1. Distal left tibial and fibular metadiaphyseal fractures at about the same   level. There is anterior displacement of distal fragments as discussed above. 2. Displaced distal tibia and fibula fractures. Tibial fracture at a lower   level than fibula fracture. Distal tibial fragment displaced almost full   shaft with anteriorly. Fibula fracture comminuted. Plate and screws in the   distal tibia fibular as discussed above.          IR PLACE NG TUBE BY DR Renita Hernandes    (Results Pending)   XR CHEST PORTABLE    (Results Pending)         John Villafana MD  2/2/22, 6:38 AM

## 2022-02-02 NOTE — PROGRESS NOTES
Orthopedic Progress Note    Patient:  Dieudonne Odom, 80 y.o. female  YOB: 1933       Subjective:  Patient seen and examined. Pain controlled on current regimen, though increased fentanyl due to anxiousness. No issues overnight per nursing, hemodynamically stable on 1 pressor. Denies fever, HA, CP, SOB, N/V. Patient current intubated with NG tube though minimally responsive to commands. Unable to assess ROS/pain due to sedation level    Objective:   Vitals:    02/02/22 0400   BP: (!) 119/45   Pulse: 100   Resp: 14   Temp: 98.7 °F (37.1 °C)   SpO2: 97%     Gen: NAD, cooperative    Cardiovascular: Regular rate    Respiratory: Symmetric chest rise. No accessory muscle use    RLE: Dressings changed; surgical incisions healing well without erythema, drainage or dehiscence. Compartments soft and easily compressible. Able to flex and extend toes weakly, but unable to respond to sensory exam. DP signals present.     LLE: Dressings changed; surgical incisions healing well without erythema, drainage or dehiscence Compartments soft and easily compressible. Able to flex and extend toes, but unable to respond to sensory exam.  Toes demonstrated BCR. DP signals present. Recent Labs     01/30/22  1201 01/31/22  0556 02/01/22  0415   WBC 12.6*   < > 15.1*   HGB 12.2   < > 8.0*   HCT 36.8   < > 24.3*      < > 132*   INR 1.0  --   --       < > 136   K 4.5   < > 4.1   BUN 35*   < > 36*   CREATININE 1.45*   < > 1.79*   GLUCOSE 114*   < > 151*    < > = values in this interval not displayed.       Anticoag: Heparin    Impression/Plan: 80 y.o. female who fell from standing height, being seen for:     - Bilateral open tibia fractures, intra-medullary nail fixation left distal tibia fracture, hindfoot intra-medullary nail fixation right distal tibia fracture POD #2     -No plan for further orthopedic surgery at this time  -WB status: WBAT BLE  -Maintain dressings on; OK to change ace bandage as needed  -Pain/ABX/DVT regimen per primary team  -Ice (20 min, 1 hour off) for edema/pain control  -Encourage deep breathing and incentive spirometry use  -Encouraged to work with PT/OT for evaluation and treatment  -Please page ortho with any questions    Electronically signed by Chad Ramsey DO, on 2/2/2022 at 4:32 AM.    PGY-2 Addendum    Patient seen and examined. Agree with subjective and objective portions from Dr. Clementina Marion. The patient is a 80 y.o. female with the surgical course as listed above. Dressings changed; OK to change prn. WBAT to BLE. Encourage mobilization with PT once extubated. No further plans for surgical intervention at this time.     Electronically signed by Ben Brown DO 5:44 AM 2/2/2022

## 2022-02-02 NOTE — PLAN OF CARE
Problem: Pain:  Goal: Pain level will decrease  Description: Pain level will decrease  2/2/2022 0516 by Javier Espino RN  Outcome: Ongoing  2/1/2022 1830 by Guillermo Lino RN  Outcome: Ongoing  Goal: Control of acute pain  Description: Control of acute pain  2/2/2022 0516 by Javier Espino RN  Outcome: Ongoing  2/1/2022 1830 by Guillermo Lino RN  Outcome: Ongoing  Goal: Control of chronic pain  Description: Control of chronic pain  2/2/2022 0516 by Javier Espino RN  Outcome: Ongoing  2/1/2022 1830 by Guillermo Lino RN  Outcome: Ongoing     Problem: Falls - Risk of:  Goal: Will remain free from falls  Description: Will remain free from falls  2/2/2022 0516 by Javier Espino RN  Outcome: Ongoing  2/1/2022 1830 by Guillermo Lino RN  Outcome: Ongoing  Goal: Absence of physical injury  Description: Absence of physical injury  2/2/2022 0516 by Javier Espino RN  Outcome: Ongoing  2/1/2022 1830 by Guillermo Lino RN  Outcome: Ongoing     Problem: Skin Integrity:  Goal: Will show no infection signs and symptoms  Description: Will show no infection signs and symptoms  2/2/2022 0516 by Javier Espino RN  Outcome: Ongoing  2/1/2022 1830 by Guillermo Lino RN  Outcome: Ongoing  Goal: Absence of new skin breakdown  Description: Absence of new skin breakdown  2/2/2022 0516 by Javier Espino RN  Outcome: Ongoing  2/1/2022 1830 by Guillermo Lino RN  Outcome: Ongoing     Problem: OXYGENATION/RESPIRATORY FUNCTION  Goal: Patient will maintain patent airway  2/2/2022 0516 by Javier Espino RN  Outcome: Ongoing  2/1/2022 1935 by Sol Bergeron RCP  Outcome: Ongoing  2/1/2022 1830 by Guillermo Lino RN  Outcome: Ongoing  Goal: Patient will achieve/maintain normal respiratory rate/effort  Description: Respiratory rate and effort will be within normal limits for the patient  2/2/2022 0516 by Javier Espino RN  Outcome: Ongoing  2/1/2022 1935 by Graciela Polo Owen RCP  Outcome: Ongoing  2/1/2022 1830 by Alea Diaz RN  Outcome: Ongoing     Problem: MECHANICAL VENTILATION  Goal: Patient will maintain patent airway  2/2/2022 0516 by Neisha Davidson RN  Outcome: Ongoing  2/1/2022 1935 by ANNE SoniP  Outcome: Ongoing  2/1/2022 1830 by Alea Diaz RN  Outcome: Ongoing  Goal: Oral health is maintained or improved  2/2/2022 0516 by Neisha Davidson RN  Outcome: Ongoing  2/1/2022 1935 by Mae Vang RCP  Outcome: Ongoing  2/1/2022 1830 by Alea Diaz RN  Outcome: Ongoing  Goal: ET tube will be managed safely  2/2/2022 0516 by Neisha Davidson RN  Outcome: Ongoing  2/1/2022 1935 by Mae Vang RCP  Outcome: Ongoing  2/1/2022 1830 by Alea Diaz RN  Outcome: Ongoing  Goal: Ability to express needs and understand communication  2/2/2022 0516 by Neisha Davidson RN  Outcome: Ongoing  2/1/2022 1935 by Mae Vang RCP  Outcome: Ongoing  2/1/2022 1830 by Alea Diaz RN  Outcome: Ongoing  Goal: Mobility/activity is maintained at optimum level for patient  2/2/2022 0516 by Neisha Davidson RN  Outcome: Ongoing  2/1/2022 1935 by Mae Vang RCP  Outcome: Ongoing  2/1/2022 1830 by Alea Diaz RN  Outcome: Ongoing     Problem: SKIN INTEGRITY  Goal: Skin integrity is maintained or improved  2/2/2022 0516 by Neisha Davidson RN  Outcome: Ongoing  2/1/2022 1935 by Mae Vang RCP  Outcome: Ongoing  2/1/2022 1830 by Alea Diaz RN  Outcome: Ongoing     Problem: Non-Violent Restraints  Goal: No harm/injury to patient while restraints in use  2/2/2022 0516 by Neisha Davidson RN  Outcome: Ongoing  2/1/2022 1830 by Alea Diaz RN  Outcome: Ongoing  Goal: Patient's dignity will be maintained  2/2/2022 0516 by Neisha Davidson RN  Outcome: Ongoing  2/1/2022 1830 by Alea Diaz RN  Outcome: Ongoing     Problem: Nutrition  Goal: Optimal nutrition therapy  2/2/2022 0516 by Ligia Keyes RN  Outcome: Ongoing  2/1/2022 1830 by Cristo Moon RN  Outcome: Ongoing     Problem: Confusion - Acute:  Goal: Absence of continued neurological deterioration signs and symptoms  Description: Absence of continued neurological deterioration signs and symptoms  Outcome: Ongoing  Goal: Mental status will be restored to baseline  Description: Mental status will be restored to baseline  Outcome: Ongoing     Problem: Discharge Planning:  Goal: Ability to perform activities of daily living will improve  Description: Ability to perform activities of daily living will improve  Outcome: Ongoing  Goal: Participates in care planning  Description: Participates in care planning  Outcome: Ongoing     Problem: Injury - Risk of, Physical Injury:  Goal: Will remain free from falls  Description: Will remain free from falls  2/2/2022 0516 by Ligia Keyes RN  Outcome: Ongoing  2/1/2022 1830 by Cristo Moon RN  Outcome: Ongoing  Goal: Absence of physical injury  Description: Absence of physical injury  2/2/2022 0516 by Ligia Keyes RN  Outcome: Ongoing  2/1/2022 1830 by Cristo Moon RN  Outcome: Ongoing     Problem: Mood - Altered:  Goal: Mood stable  Description: Mood stable  Outcome: Ongoing  Goal: Absence of abusive behavior  Description: Absence of abusive behavior  Outcome: Ongoing  Goal: Verbalizations of feeling emotionally comfortable while being cared for will increase  Description: Verbalizations of feeling emotionally comfortable while being cared for will increase  Outcome: Ongoing     Problem: Psychomotor Activity - Altered:  Goal: Absence of psychomotor disturbance signs and symptoms  Description: Absence of psychomotor disturbance signs and symptoms  Outcome: Ongoing     Problem: Sensory Perception - Impaired:  Goal: Demonstrations of improved sensory functioning will increase  Description: Demonstrations of improved sensory functioning will increase  Outcome: Ongoing  Goal: Decrease in sensory misperception frequency  Description: Decrease in sensory misperception frequency  Outcome: Ongoing  Goal: Able to refrain from responding to false sensory perceptions  Description: Able to refrain from responding to false sensory perceptions  Outcome: Ongoing  Goal: Demonstrates accurate environmental perceptions  Description: Demonstrates accurate environmental perceptions  Outcome: Ongoing  Goal: Able to distinguish between reality-based and nonreality-based thinking  Description: Able to distinguish between reality-based and nonreality-based thinking  Outcome: Ongoing  Goal: Able to interrupt nonreality-based thinking  Description: Able to interrupt nonreality-based thinking  Outcome: Ongoing     Problem: Sleep Pattern Disturbance:  Goal: Appears well-rested  Description: Appears well-rested  Outcome: Ongoing     Problem: Non-Violent Restraints  Goal: Removal from restraints as soon as assessed to be safe  2/2/2022 0516 by Alexis Aguilar RN  Outcome: Not Met This Shift  2/1/2022 1830 by Gadiel Rollins RN  Outcome: Ongoing

## 2022-02-02 NOTE — CARE COORDINATION
[facility] Quality Flow/Interdisciplinary Rounds Progress Note    Quality Flow Rounds held on February 2, 2022 at [address] Attending:  Bedside Nurse, ,  and Nursing Unit Leadership    Barriers to Discharge: Intubated, weaning. SNF placement    Anticipated Discharge Date:  Expected Discharge Date:  (Unknown)    Anticipated Discharge Disposition: SNF    Readmission Risk              Risk of Unplanned Readmission:  19           Discussed patient goal for the day, patient clinical progression, and barriers to discharge. The following Goal(s) of the Day/Commitment(s) have been identified:  Activity Progression  Transitional Care Plan    Pt to IR today for NG placement. To begin weaning trials with goal of extubation. Bilateral leg fractures, plan for SNF. Called [facility] ([phone] )to inquire about status of referral.  Left  with Kelly Leung in Admissions to inquire about status of referral.  Left  with request for CB    1400 Call received from Kelly Leung with [id number]. She requested H&P, Face Sheet and Provider notes be faxed to [phone]. She also verified that patient has been fully vaccinated. She informs CM that they presently have no Non-CoVid beds, that all units have active CoVid. She will review clinicals and notify CM if they are willing to accept/follow patient once patient is closer to discharge.         Gómez Laboy RN  February 2, 2022

## 2022-02-02 NOTE — FLOWSHEET NOTE
SPIRITUAL CARE DEPARTMENT - [facility] [address]  PROGRESS NOTE    Shift date: 02/02/2022  Shift day: Wednesday   Shift # 2    Room # 1834/9985-54   Name: Lucila Sung            Age: 80 y.o. Gender: female          Gnosticism: [address] of Taoism:     Referral: Routine Visit    Admit Date & Time: 1/30/2022 11:29 AM    PATIENT/EVENT DESCRIPTION:  Lucila Sung is a 80 y.o. female   Patient was lying in bed with daughter and granddaughter at her bedside. Patient appeared uncomfortable. Nurses were also at bedside talking with family and trying to reassure them. SPIRITUAL ASSESSMENT/INTERVENTION:  This  was called in by  Alpesh Stone to go and see this patient and family for prayer. This patient had fallen badly and appeared to be severely injured in TICU. Daughter and granddaughter at bedside. Nurses were also at her bedside. The family was trying to encourage the patient to squeeze their hands and wiggle the patient's toes. The other daughter on facetime was also trying to offer encouragement. This  prayed with the patient and said:\"Peace Be With You. \" And the patient mouthed: \"And Also With You! \" This  told her that she was a strong Mandaeism woman and tears flowed down her eyes; this  told her to get some rest so that she could heal and recover. This  blessed the patient. This  let the family and nurses know that  services were available 24/7. SPIRITUAL CARE FOLLOW-UP PLAN:  Chaplains will remain available to offer spiritual and emotional support as needed.       Electronically signed by Yanci Dwoney on 2/2/2022 at 5:58 PM.  [facility] [employer]  [phone]

## 2022-02-02 NOTE — CARE COORDINATION
Call from Brijesh at [facility]. Patient is accepted as long as she does not have an NG tube. They will continue to follow.

## 2022-02-02 NOTE — PLAN OF CARE
Problem: OXYGENATION/RESPIRATORY FUNCTION  Goal: Patient will maintain patent airway  2/1/2022 1935 by Juan Perez RCP  Outcome: Ongoing  2/1/2022 1830 by Shahnaz Puga RN  Outcome: Ongoing  2/1/2022 0817 by Yuri Fisher RN  Outcome: Ongoing  Goal: Patient will achieve/maintain normal respiratory rate/effort  Description: Respiratory rate and effort will be within normal limits for the patient  2/1/2022 1935 by Juan Perez RCP  Outcome: Ongoing  2/1/2022 1830 by Shahnaz Puga RN  Outcome: Ongoing  2/1/2022 0817 by Yuri Fisher RN  Outcome: Ongoing     Problem: MECHANICAL VENTILATION  Goal: Patient will maintain patent airway  2/1/2022 1935 by Juan Perez RCP  Outcome: Ongoing  2/1/2022 1830 by Shahnaz Puga RN  Outcome: Ongoing  2/1/2022 0817 by Yuri Fisher RN  Outcome: Ongoing  Goal: Oral health is maintained or improved  2/1/2022 1935 by Juan Perez RCP  Outcome: Ongoing  2/1/2022 1830 by Shahnaz Puga RN  Outcome: Ongoing  2/1/2022 0817 by Yuri Fisher RN  Outcome: Ongoing  Goal: ET tube will be managed safely  2/1/2022 1935 by Juan Perez RCP  Outcome: Ongoing  2/1/2022 1830 by Shahnaz Puga RN  Outcome: Ongoing  2/1/2022 0817 by Yuri Fisher RN  Outcome: Ongoing  Goal: Ability to express needs and understand communication  2/1/2022 1935 by Juan Perez RCP  Outcome: Ongoing  2/1/2022 1830 by Shahnaz Puga RN  Outcome: Ongoing  2/1/2022 0817 by Yuri Fisher RN  Outcome: Ongoing  Goal: Mobility/activity is maintained at optimum level for patient  2/1/2022 1935 by Juan Perez RCP  Outcome: Ongoing  2/1/2022 1830 by Shahnaz Puga RN  Outcome: Ongoing  2/1/2022 0817 by Yuri Fisher RN  Outcome: Ongoing     Problem: SKIN INTEGRITY  Goal: Skin integrity is maintained or improved  2/1/2022 1935 by Juan Perez RCP  Outcome: Ongoing  2/1/2022 1830 by Shahnaz Puga RN  Outcome: Ongoing  2/1/2022 1428 by Cate Wolff RN  Outcome: Ongoing

## 2022-02-02 NOTE — PLAN OF CARE
Problem: Pain:  Goal: Pain level will decrease  Description: Pain level will decrease  2/2/2022 1146 by Donnajean Schwab, RN  Outcome: Ongoing  2/2/2022 0516 by Woody López RN  Outcome: Ongoing  Goal: Control of acute pain  Description: Control of acute pain  2/2/2022 1146 by Donnajean Schwab, RN  Outcome: Ongoing  2/2/2022 0516 by Woody López RN  Outcome: Ongoing  Goal: Control of chronic pain  Description: Control of chronic pain  2/2/2022 1146 by Donnajean Schwab, RN  Outcome: Ongoing  2/2/2022 0516 by Woody López RN  Outcome: Ongoing     Problem: Falls - Risk of:  Goal: Will remain free from falls  Description: Will remain free from falls  2/2/2022 1146 by Donnajean Schwab, RN  Outcome: Ongoing  2/2/2022 0516 by Woody López RN  Outcome: Ongoing  Goal: Absence of physical injury  Description: Absence of physical injury  2/2/2022 1146 by Donnajean Schwab, RN  Outcome: Ongoing  2/2/2022 0516 by Woody López RN  Outcome: Ongoing     Problem: Skin Integrity:  Goal: Will show no infection signs and symptoms  Description: Will show no infection signs and symptoms  2/2/2022 1146 by Donnajean Schwab, RN  Outcome: Ongoing  2/2/2022 0516 by Woody López RN  Outcome: Ongoing  Goal: Absence of new skin breakdown  Description: Absence of new skin breakdown  2/2/2022 1146 by Donnajean Schwab, RN  Outcome: Ongoing  2/2/2022 0516 by Woody López RN  Outcome: Ongoing     Problem: OXYGENATION/RESPIRATORY FUNCTION  Goal: Patient will maintain patent airway  2/2/2022 1146 by Donnajean Schwab, RN  Outcome: Ongoing  2/2/2022 0516 by Woody López RN  Outcome: Ongoing  Goal: Patient will achieve/maintain normal respiratory rate/effort  Description: Respiratory rate and effort will be within normal limits for the patient  2/2/2022 1146 by Donnajean Schwab, RN  Outcome: Ongoing  2/2/2022 0516 by Woody López RN  Outcome: Ongoing     Problem: MECHANICAL VENTILATION  Goal: Patient YUE Vidal  Outcome: Ongoing  2/2/2022 0516 by Lora Arnold RN  Outcome: Ongoing  Goal: Mental status will be restored to baseline  Description: Mental status will be restored to baseline  2/2/2022 1146 by Arabella Pepe RN  Outcome: Ongoing  2/2/2022 0516 by Lora Arnold RN  Outcome: Ongoing     Problem: Discharge Planning:  Goal: Ability to perform activities of daily living will improve  Description: Ability to perform activities of daily living will improve  2/2/2022 1146 by Arabella Pepe RN  Outcome: Ongoing  2/2/2022 0516 by Lora Arnold RN  Outcome: Ongoing  Goal: Participates in care planning  Description: Participates in care planning  2/2/2022 1146 by Arabella Pepe RN  Outcome: Ongoing  2/2/2022 0516 by Lora Arnold RN  Outcome: Ongoing     Problem: Injury - Risk of, Physical Injury:  Goal: Will remain free from falls  Description: Will remain free from falls  2/2/2022 1146 by Arabella Pepe RN  Outcome: Ongoing  2/2/2022 0516 by Lora Arnold RN  Outcome: Ongoing  Goal: Absence of physical injury  Description: Absence of physical injury  2/2/2022 1146 by Arabella Pepe RN  Outcome: Ongoing  2/2/2022 0516 by Lora Arnold RN  Outcome: Ongoing     Problem: Mood - Altered:  Goal: Mood stable  Description: Mood stable  2/2/2022 1146 by Arabella Pepe RN  Outcome: Ongoing  2/2/2022 0516 by Lora Arnold RN  Outcome: Ongoing  Goal: Absence of abusive behavior  Description: Absence of abusive behavior  2/2/2022 1146 by Arabella Pepe RN  Outcome: Ongoing  2/2/2022 0516 by Lora Arnold RN  Outcome: Ongoing  Goal: Verbalizations of feeling emotionally comfortable while being cared for will increase  Description: Verbalizations of feeling emotionally comfortable while being cared for will increase  2/2/2022 1146 by Arabella Pepe RN  Outcome: Ongoing  2/2/2022 0516 by Lora Arnold RN  Outcome: Ongoing     Problem: Psychomotor Activity - Altered:  Goal: Absence of psychomotor disturbance signs and symptoms  Description: Absence of psychomotor disturbance signs and symptoms  2/2/2022 1146 by Laney Parish RN  Outcome: Ongoing  2/2/2022 0516 by Lydia Salazar RN  Outcome: Ongoing     Problem: Sensory Perception - Impaired:  Goal: Demonstrations of improved sensory functioning will increase  Description: Demonstrations of improved sensory functioning will increase  2/2/2022 1146 by Laney Parish RN  Outcome: Ongoing  2/2/2022 0516 by Lydia Salazar RN  Outcome: Ongoing  Goal: Decrease in sensory misperception frequency  Description: Decrease in sensory misperception frequency  2/2/2022 1146 by Laney Parish RN  Outcome: Ongoing  2/2/2022 0516 by Lydia Salazar RN  Outcome: Ongoing  Goal: Able to refrain from responding to false sensory perceptions  Description: Able to refrain from responding to false sensory perceptions  2/2/2022 1146 by Laney Parish RN  Outcome: Ongoing  2/2/2022 0516 by Lydia Salazar RN  Outcome: Ongoing  Goal: Demonstrates accurate environmental perceptions  Description: Demonstrates accurate environmental perceptions  2/2/2022 1146 by Laney Parish RN  Outcome: Ongoing  2/2/2022 0516 by Lydia Salazar RN  Outcome: Ongoing  Goal: Able to distinguish between reality-based and nonreality-based thinking  Description: Able to distinguish between reality-based and nonreality-based thinking  2/2/2022 1146 by Laney Parish RN  Outcome: Ongoing  2/2/2022 0516 by Lydia Salazar RN  Outcome: Ongoing  Goal: Able to interrupt nonreality-based thinking  Description: Able to interrupt nonreality-based thinking  2/2/2022 1146 by Laney Parish RN  Outcome: Ongoing  2/2/2022 0516 by Lydia Salazar RN  Outcome: Ongoing     Problem: Sleep Pattern Disturbance:  Goal: Appears well-rested  Description: Appears well-rested  2/2/2022 1146 by Laney Parish RN  Outcome: Ongoing  2/2/2022 0516 by Teodora Gordon RN  Outcome: Ongoing

## 2022-02-02 NOTE — PROGRESS NOTES
[facility]   Progress Note                   Date:   2/2/2022  Patient name: Sunday Zuniga  Date of admission:  1/30/2022 11:29 AM  MRN:   [id number]  YOB: 1933  PCP: Justice Barrientos DO    Reason for Admission: Risk stratification    Subjective:   No acute episodes overnight  Patient is heme stable and afebrile  Echo from yesterday reviewed  Troponin slightly elevated   HR well controlled - ranging from 90 to 100's  AM BMP and CBC reviewed  Remains on levophed  Plan for extubation today     Medications:   Scheduled Meds:   insulin lispro  0-18 Units SubCUTAneous Q4H    oxyCODONE  5 mg Per NG tube Q6H    potassium bicarb-citric acid  20 mEq Per NG tube Daily    pregabalin  100 mg Per G Tube 3 times per day    levothyroxine  25 mcg Per G Tube Daily    primidone  50 mg Oral TID    heparin (porcine)  5,000 Units SubCUTAneous 3 times per day    metoclopramide  5 mg IntraVENous 4 times per day    acetaminophen  1,000 mg Oral Q8H    chlorhexidine  15 mL Mouth/Throat BID    famotidine (PEPCID) injection  20 mg IntraVENous Daily    sodium chloride flush  5-40 mL IntraVENous 2 times per day    polyethylene glycol  17 g Oral Daily     Continuous Infusions:   sodium chloride 50 mL/hr at 02/02/22 1052    fentaNYL 50 mcg/hr (02/02/22 1051)    norepinephrine 4 mcg/min (02/02/22 0959)    sodium chloride       CBC:   Recent Labs     01/31/22 2028 02/01/22  0415 02/02/22  0536   WBC 11.2 15.1* 14.4*   HGB 7.4* 8.0* 7.6*   PLT See Reflexed IPF Result 132* 190     BMP:    Recent Labs     01/31/22 2028 01/31/22 2028 02/01/22  0415 02/02/22  0536 02/02/22  0749     --  136 135  --    K 4.2  --  4.1 3.8  --      --  99 100  --    CO2 24  --  23 23  --    BUN 33*  --  36* 36*  --    CREATININE 1.53*   < > 1.79* 1.75* 1.81*   GLUCOSE 135*  --  151* 181*  --     < > = values in this interval not displayed.      Hepatic: No results for input(s): AST, ALT, ALB, BILITOT, ALKPHOS in the last 72 hours. Troponin: No results for input(s): TROPONINI in the last 72 hours. BNP: No results for input(s): BNP in the last 72 hours. Lipids: No results for input(s): CHOL, HDL in the last 72 hours. Invalid input(s): LDLCALCU  INR:   Recent Labs     22  1201   INR 1.0     Objective:   Vitals: BP (!) 98/44   Pulse 96   Temp 99.1 °F (37.3 °C) (Axillary)   Resp 15   Ht 5' 9\" (1.753 m)   Wt 263 lb (119.3 kg)   SpO2 99%   BMI 38.84 kg/m²      Constitutional and General Appearance: alert, cooperative, no distress and appears stated age  HEENT: PERRL, no cervical lymphadenopathy. No masses palpable. Normal oral mucosa  Respiratory:  · Normal excursion and expansion without use of accessory muscles  · Resp Auscultation: Good respiratory effort. No for increased work of breathing. On auscultation: clear to auscultation bilaterally  Cardiovascular:  · The apical impulse is not displaced  · Heart tones are crisp and normal. regular S1 and S2.  · Jugular venous pulsation Normal  · The carotid upstroke is normal in amplitude and contour without delay or bruit  · Peripheral pulses are symmetrical and full   Abdomen:   · No masses or tenderness  · Bowel sounds present  ·    Neurological:  · Alert and oriented. · Moves all extremities well  · No abnormalities of mood, affect, memory, mentation, or behavior are noted    [id number]  Atrial fibrillation with rapid ventricular response with premature ventricular or aberrantly conducted complexes    Echocardiogram:  2022  Technically difficult study  Normal LV size , moderately increased wall thickness. Hyperdynamic wall motions  Normal LV systolic function with LVEF >65%. Normal RV size and function. RV systolic pressure 40 mmHg  LA is moderately dilated, RA appears dilated. Calcified AV. Moderate AS. Peak gradient 54 mmHg, mean gradient 28 mmHg  Normal aortic root dimension. No significant pericardial effusion noted.   IVC is dilated, impaired or no inspiratory variation indicating elevated RA  filling pressure. Assessment:   1. Atrial Fibrillation - Rate controlled, CHADS VASC Score of 6, HAS BLED Score of 1  2. H/O PPM    Patient Active Problem List:     Fall at home, initial encounter     Type III open fracture of both tibias    Treatment Plan:   1. Continue telemetry. IV Digoxin doses if needed. 2. Further management per Primary. Plan for extubation today. 3. Will follow. Further recommendations after discussion with Dr. Jen Hinojosa MD  PGY-2, Internal Medicine Resident  [address] [facility]  2/2/2022 11:35 AM      Attending Physician Statement  I have discussed the care of Sunday Zuniga, including pertinent history and exam findings,  with the cardiology fellow/resident. I have seen and examined the patient and the key elements of all parts of the encounter have been performed by me. I agree with the assessment, plan and orders as documented by the resident with additional recommendations as below:     Off vasopressors. HR better controlled but in RVR this afternoon, -110, will repeat IV digoxin 125 mcg, start low dose lopressor 12.5 mg bid from tonight if BP remains stable. Recommend IV lasix 20 mg QD. Patient does have watchman device in situ. Will hold off on anticoagulation at this time. DVT prophylaxis.        Cary Marina MD, [facility] [place], [facility]

## 2022-02-03 NOTE — FLOWSHEET NOTE
Pt who is A&O x4 and CAM negative, expressed she wanted code status to be palliative/hospice care.  When Dr. Nancy Peterson was called to bedside to discuss they decided code status would be determind in the AM.     Electronically signed by Javier Espino RN on 2/3/2022 at 6:15 AM

## 2022-02-03 NOTE — FLOWSHEET NOTE
Writer assisted RT in extubating pt. Pt was successfully extubated at [address] [place] [address]. Pt Was A&O x4. Family notified.     Electronically signed by Albert Womack RN on 2/2/2022 at 9:50 PM

## 2022-02-03 NOTE — PLAN OF CARE
Problem: Pain:  Goal: Pain level will decrease  Description: Pain level will decrease  Outcome: Ongoing  Goal: Control of acute pain  Description: Control of acute pain  Outcome: Ongoing  Goal: Control of chronic pain  Description: Control of chronic pain  Outcome: Ongoing     Problem: Falls - Risk of:  Goal: Will remain free from falls  Description: Will remain free from falls  Outcome: Ongoing  Goal: Absence of physical injury  Description: Absence of physical injury  Outcome: Ongoing     Problem: Skin Integrity:  Goal: Will show no infection signs and symptoms  Description: Will show no infection signs and symptoms  Outcome: Ongoing  Goal: Absence of new skin breakdown  Description: Absence of new skin breakdown  Outcome: Ongoing     Problem: OXYGENATION/RESPIRATORY FUNCTION  Goal: Patient will maintain patent airway  Outcome: Ongoing  Goal: Patient will achieve/maintain normal respiratory rate/effort  Description: Respiratory rate and effort will be within normal limits for the patient  Outcome: Ongoing     Problem: MECHANICAL VENTILATION  Goal: Patient will maintain patent airway  Outcome: Ongoing  Goal: Oral health is maintained or improved  Outcome: Ongoing  Goal: ET tube will be managed safely  Outcome: Ongoing  Goal: Ability to express needs and understand communication  Outcome: Ongoing  Goal: Mobility/activity is maintained at optimum level for patient  Outcome: Ongoing     Problem: SKIN INTEGRITY  Goal: Skin integrity is maintained or improved  Outcome: Ongoing     Problem: Non-Violent Restraints  Goal: Removal from restraints as soon as assessed to be safe  Outcome: Ongoing  Goal: No harm/injury to patient while restraints in use  Outcome: Ongoing  Goal: Patient's dignity will be maintained  Outcome: Ongoing     Problem: Nutrition  Goal: Optimal nutrition therapy  Outcome: Ongoing     Problem: Confusion - Acute:  Goal: Absence of continued neurological deterioration signs and symptoms  Description: Absence of continued neurological deterioration signs and symptoms  Outcome: Ongoing  Goal: Mental status will be restored to baseline  Description: Mental status will be restored to baseline  Outcome: Ongoing     Problem: Discharge Planning:  Goal: Ability to perform activities of daily living will improve  Description: Ability to perform activities of daily living will improve  Outcome: Ongoing  Goal: Participates in care planning  Description: Participates in care planning  Outcome: Ongoing     Problem: Injury - Risk of, Physical Injury:  Goal: Will remain free from falls  Description: Will remain free from falls  Outcome: Ongoing  Goal: Absence of physical injury  Description: Absence of physical injury  Outcome: Ongoing     Problem: Mood - Altered:  Goal: Mood stable  Description: Mood stable  Outcome: Ongoing  Goal: Absence of abusive behavior  Description: Absence of abusive behavior  Outcome: Ongoing  Goal: Verbalizations of feeling emotionally comfortable while being cared for will increase  Description: Verbalizations of feeling emotionally comfortable while being cared for will increase  Outcome: Ongoing     Problem: Psychomotor Activity - Altered:  Goal: Absence of psychomotor disturbance signs and symptoms  Description: Absence of psychomotor disturbance signs and symptoms  Outcome: Ongoing     Problem: Sensory Perception - Impaired:  Goal: Demonstrations of improved sensory functioning will increase  Description: Demonstrations of improved sensory functioning will increase  Outcome: Ongoing  Goal: Decrease in sensory misperception frequency  Description: Decrease in sensory misperception frequency  Outcome: Ongoing  Goal: Able to refrain from responding to false sensory perceptions  Description: Able to refrain from responding to false sensory perceptions  Outcome: Ongoing  Goal: Demonstrates accurate environmental perceptions  Description: Demonstrates accurate environmental perceptions  Outcome: Ongoing  Goal: Able to distinguish between reality-based and nonreality-based thinking  Description: Able to distinguish between reality-based and nonreality-based thinking  Outcome: Ongoing  Goal: Able to interrupt nonreality-based thinking  Description: Able to interrupt nonreality-based thinking  Outcome: Ongoing     Problem: Sleep Pattern Disturbance:  Goal: Appears well-rested  Description: Appears well-rested  Outcome: Ongoing

## 2022-02-03 NOTE — PROGRESS NOTES
Order obtained for extubation. SpO2 of 97% on 30% FiO2. Patient extubated and placed on NC 3lpm.   Post extubation SpO2 is 95% with HR 109bpm, and RR 20 breaths/min. Patient had strong cough that was productivie. Extubation:  Well tolerated by patient. .   Breath Sounds: clear and diminished in bilateral lower lobes.     Nancie Blanchard RCP   8:10 PM

## 2022-02-03 NOTE — FLOWSHEET NOTE
[facility]  PROGRESS NOTE    Shift date: 02/03/2022  Shift day: Thursday   Shift # 1    Room # 5213/6132-07   Name: Corine Palomo            Age: 80 y.o. Gender: female          Zoroastrian: [address] [facility] [employer]:     Referral: Routine Visit    Admit Date & Time: 1/30/2022 11:29 AM    PATIENT/EVENT DESCRIPTION:  Corine Palomo is a 80 y.o. female   Patient lying in bed with nurse at her side. SPIRITUAL ASSESSMENT/INTERVENTION:  This  was asked by  Fr. Hernandez to go and see this patient in TICU. The patient was tearful. . The patient was being taken care of by the nurse at the time that this  arrived. Nurse Mohini Bell informed this  that dtr Dior Sagastume wanted to do a zoom call with her mother some time today. This  said that she would set it up for the patient. This  actively listened to the patient and was compassionate with the patient. This patient talked about her decision to go into hospice. This  spoke with her about her decision. The patient seems glad about her decision. The patient was tearful. This  asked the patient if she would like prayer. The patient said yes and this  prayed for the patient. The patient was grateful. The nurse was appreciative that this  is going to set up zoom call. This  spoke with dtr Dior Sagastume to set up zoom call for this afternoon at 5 PM. Dtr Dior Sagastume was tearful and said that she was abiding by her mother's wishes. She was feeling sad. This  consoled clausr Dior Sagastume. This  said that she would be available to the family if they would like to talk more. This  will be doing the zoom call at 5 PM.      [facility]:  Chaplains will remain available to offer spiritual and emotional support as needed.       Electronically signed by Cate Marrero on 2/3/2022 at 1:57 PM.  [address] [place] [address] [address]  [phone]     02/03/22 1352   Encounter Summary   Services provided to: Patient; Family  (Called daughter Mau García afterwards to set up [employer] for 5 )   Referral/Consult From: Other    Support System Children;Family members   Continue Visiting   (02/03/2022)   Complexity of Encounter High   Length of Encounter 1 hour   Spiritual Assessment Completed Yes   Routine   Type Follow up   Assessment Approachable;Grieving;Spiritual struggle;Coping;Sad  (Dtr Mau García also sad)   Intervention Active listening;Explored feelings, thoughts, concerns;Nurtured hope;Prayer;[place];Empowerment; Discussed meaning/purpose;Discussed illness/injury and it's impact   Outcome Comfort;Expressed gratitude;Engaged in conversation; Shared life review;Expressed feelings/needs/concerns;Coping;Tearful;Grieving;Receptive;Venting emotion  (Chidi García also talked abt her feelings on phone)

## 2022-02-03 NOTE — PROGRESS NOTES
[facility]   Progress Note                   Date:   2/3/2022  Patient name: Cliff Asif  Date of admission:  1/30/2022 11:29 AM  MRN:   [id number]  YOB: 1933  PCP: Manuelito Simons DO    Reason for Admission: Risk stratification    Subjective:     Extubated. Resting in bed. She has been off pressors since yesterday morning. Medications:   Scheduled Meds:   metoprolol tartrate  12.5 mg Oral BID    sodium phosphate IVPB  20 mmol IntraVENous Once    insulin lispro  0-18 Units SubCUTAneous Q4H    potassium bicarb-citric acid  20 mEq Per NG tube Daily    methocarbamol  750 mg Oral 4 times per day    pregabalin  100 mg Per G Tube 3 times per day    levothyroxine  25 mcg Per G Tube Daily    primidone  50 mg Oral TID    metoclopramide  5 mg IntraVENous 4 times per day    acetaminophen  1,000 mg Oral Q8H    sodium chloride flush  5-40 mL IntraVENous 2 times per day    polyethylene glycol  17 g Oral Daily     Continuous Infusions:    CBC:   Recent Labs     02/01/22 0415 02/02/22  0536 02/03/22  0800   WBC 15.1* 14.4* 8.7   HGB 8.0* 7.6* 7.6*   * 190 146     BMP:    Recent Labs     02/01/22  0415 02/01/22  0415 02/02/22  0536 02/02/22  0749 02/02/22  1907 02/03/22  0800     --  135  --   --  140   K 4.1  --  3.8  --   --  4.1   CL 99  --  100  --   --  107   CO2 23  --  23  --   --  27   BUN 36*  --  36*  --   --  30*   CREATININE 1.79*   < > 1.75* 1.81* 1.62* 1.16*   GLUCOSE 151*  --  181*  --   --  194*    < > = values in this interval not displayed. Objective:   Vitals: BP (!) 105/46   Pulse 96   Temp 98.6 °F (37 °C) (Oral)   Resp 17   Ht 5' 9\" (1.753 m)   Wt 264 lb (119.7 kg)   SpO2 93%   BMI 38.99 kg/m²      Constitutional and General Appearance: alert, cooperative, no distress and appears stated age  HEENT: PERRL, no cervical lymphadenopathy. No masses palpable.  Normal oral mucosa  Respiratory:  · Normal excursion and expansion without use of accessory muscles  · Resp Auscultation: Good respiratory effort. No for increased work of breathing. On auscultation: clear to auscultation bilaterally  Cardiovascular:  · The apical impulse is not displaced  · Heart tones are crisp and normal. regular S1 and S2.  · Jugular venous pulsation Normal  · The carotid upstroke is normal in amplitude and contour without delay or bruit  · Peripheral pulses are symmetrical and full   Abdomen:   · No masses or tenderness  · Bowel sounds present  ·    Neurological:  · Alert and oriented. · Moves all extremities well  · No abnormalities of mood, affect, memory, mentation, or behavior are noted    [id number]  Atrial fibrillation with rapid ventricular response with premature ventricular or aberrantly conducted complexes    Echocardiogram:  2022  Technically difficult study  Normal LV size , moderately increased wall thickness. Hyperdynamic wall motions  Normal LV systolic function with LVEF >65%. Normal RV size and function. RV systolic pressure 40 mmHg  LA is moderately dilated, RA appears dilated. Calcified AV. Moderate AS. Peak gradient 54 mmHg, mean gradient 28 mmHg  Normal aortic root dimension. No significant pericardial effusion noted. IVC is dilated, impaired or no inspiratory variation indicating elevated RA  filling pressure. Assessment:   1. Atrial Fibrillation - Rate controlled, CHADS VASC Score of 6, HAS BLED Score of 1  2. H/O PPM      Patient Active Problem List:     Fall at home, initial encounter     Type III open fracture of both tibias    Treatment Plan:   1. Continue telemetry. IV Digoxin doses if needed. 2. Further management per Primary. Plan for extubation today. 3. She is [facility] now with hospice. 4. Will sign off.    Mylene Talavera MD

## 2022-02-03 NOTE — PROGRESS NOTES
Physician Progress Note      PATIENT:               Rosita Echeverria  CSN #:                  [id number]  :                       1933  ADMIT DATE:       2022 11:29 AM  DISCH DATE:  RESPONDING  PROVIDER #:        GABRIELLE HENRY - CNP          QUERY TEXT:    Pt admitted with Bilateral open distal tibia/fibula fracture. Pt noted to have   Osteopenia. If possible, please document in progress notes and discharge   summary if you are evaluating and/or treating any of the following: The medical record reflects the following:  Risk Factors: Age 80, DM, PMH osteopenia,  Clinical Indicators: CT left tibia and fibula: 1. Acute and displaced fractures   of the distal diaphysis of the left tibia  and fibula with surrounding soft tissue edema and gas consistent with open   fractures. 2. Nondisplaced fracture of the tip of the left lateral malleolus. 3.Osteopenia. CT right tibia and fibula: 1. Acute and displaced fractures of   the distal diaphysis of the left tibia and fibula with surrounding soft tissue   edema and gas consistent with open fractures. 2.Nondisplaced fracture of the   tip of the left lateral malleolus. 3.Osteopenia. Treatment: Surgical debriedment of bone and surgical ORIF with fixation. HX of    home med Cholecalciferol (VITAMIN D3) 125 MCG (5000 UT) TABS monitoring    Thank you, Please call if questions  Yolanda Rashid RN [facility]  [phone]  Options provided:  -- Pathological Bilateral open distal tibia/fibula  fracture  -- Pathological Bilateral open distal tibia/fibula  fracture due to osteopenia  -- Pathological Bilateral open distal tibia/fibula fracture due to osteopenia   following fall which would not usually break a normal, healthy bone  -- Traumatic Bilateral open distal tibia/fibula fracture  -- Other - I will add my own diagnosis  -- Disagree - Not applicable / Not valid  -- Disagree - Clinically unable to determine / Unknown  -- Refer to Clinical Documentation Reviewer    PROVIDER RESPONSE TEXT:    This patient has a traumatic Bilateral open distal tibia/fibula fracture.     Query created by: Maria Luz Gunter on 2/3/2022 9:19 AM      Electronically signed by:  Jemal Walsh CNP 2/3/2022 9:21 AM

## 2022-02-03 NOTE — PROGRESS NOTES
Trauma/Surigical Critical Care Sign Out Note:     Date and time: 2/3/2022 4:27 PM  Patient's name:  Niki Zarco  Medical Record Number: [id number]  Patient's YOB: 1933  Age: 80 y.o. Date of Admission: 2022 11:29 AM  Length of stay during current admission: 4    Code Status: DNR-CC    Mode of physician to physician communication:        [] Via telephone   [x] In person     Date and time of sign-out: 2/3/2022 4:27 PM    Patient's current ICU Bed:  173    Patient's assigned bed on floor:  4C         [x] Med-Surg  [] Step-down         Reason for ICU admission:  Remained intubated s/p bilateral tibial IMN 2022    Injuries:  Bilateral open tib/fib fractures     ICU course summary:  Patient arrived to TICU intubated post operatively after undergoing bilateral tibial IMN. She was in A. Fib with RVR. Patient received multiple doses of digoxin and lopressor to control her heart rate. She required pressors for the first day to maintain her MAPs. Patient HR was controlled, and pressures normalized. NGT was placed by IR due to difficulty with her hiatal hernia for nutritional access. Sedation was weaned and patient was able to awaken and follow commands 2/2. She was placed on an SBT, did well, and was subsequently extubated. Upon being extubated, patient was alert and appropriate. Requesting to discuss her code status. Palliative had a discussion with the patient and her daughters, and the decision was made to change code status to DNR CC. With this change, decision was made to transfer patient out of ICU to med/surg.      Procedures during ICU stay:  R femoral arterial line placement (removed 2/3)  NGT placement by IR     Current vitals:  Temp: Temp: 98.6 °F (37 °C)Temp  Av °F (37.2 °C)  Min: 98.6 °F (37 °C)  Max: 99.8 °F (86.4 °C) BP Systolic (31SKH), HBO:679 , Min:97 , RACHEL:595   Diastolic (62VHV), AZO:64, Min:45, Max:56   Pulse Pulse  Av.4  Min: 88  Max: 146 Resp Resp  Av.5 Min: 3  Max: 28 Pulse ox SpO2  Av.5 %  Min: 89 %  Max: 100 %    Physical exam:  GENERAL: awake, alert, no apparent distress   NEURO: awake, alert, moving all extremities, following commands   LUNGS: normal effort; symmetric rise and fall of chest wall  HEART: regular rate, irregular rhythm   ABDOMEN: soft, nondistended, non-tender   EXTREMITY: ACE wraps to bilateral lower extremities - clean, dry and intact     Consults:  1. Orthopedic surgery  2. Cardiology   3. Palliative     Assessment:  3 80year old female POD #3 s/p bilateral tibial IMN     Plan and recommended follow-up:    1. DNR-CC  2. Referrals sent to hospice via CM   3.  Discharge planning     Kristine De La Garza, DO PGY-2  2/3/2022, 4:27 PM

## 2022-02-03 NOTE — PROGRESS NOTES
Palliative Care Progress Note    NAME:  Flakito Pugh RECORD NUMBER:  [id number]  AGE: 80 y.o.    GENDER: female  : 1933  TODAY'S DATE:  2/3/2022    Reason for Consult:  goals of care, hospice discussion and CODE STATUS discussion and family support    Plan        Palliative Interaction:  The patient was seen today, was lying comfortably in the bed, in no acute distress, awake, alert, oriented and following commands  No family member was present by patient's bedside  I met with the patient along with RN Lynn Henry and introduced myself to the patient  I explained the role of palliative care to the patient and also explained her that palliative care is an extra layer of support and strength for the patient and family  I also explained to the patient that palliative care helps in symptom management  I discussed patient's current medical conditions with her  Patient told that she \" is very tired \" and has been suffering from pain since long time and would like to be made comfort care  I explained the different types of codes to the patient and patient said that she would want comfort care  Patient further told that she wanted hospice to be contacted  I explained about hospice care to the patient and patient stated that she knows about it since her  had been on hospice care with [facility] many years back  I asked the patient if I could call patient's daughter Jules Jorge who also is patient's POA for health and patient stated that I could call her    I along with patient's RN Lynn Henry called Jules Patel who also is patient's POA for health at - [phone]  I introduced myself to Jules Patel and explained to her the role of palliative care told her that palliative care is an extra layer of support and strength for the patient and family  I updated Jules Patel regarding my conversation with the patient and also informed her that patient had requested to be made comfort care and asked for hospice to be contacted  Sona Wallace was very tearful and stated that her mother had also been telling her that she wanted to die as she is very tired  Sona Wallace said that she wanted to respect patienther mother's wishes and if patient wanted to be made comfort care and have hospice then she will follow her mother's wishes  Sona Wallace said that she will be contacting her niece Jesus Bartholomew as well as her sister Mckenzie Li explained to Sona Wallace that we are not attached or affiliated with any hospice but the  do help the patients and families in contacting the hospice that family wants  I did explain to Sona Wallace that whichever hospice the patient and family chooses we will contact that facility and Sona Wallace was agreeable with contacting [address] told that she is currently in [place] with her daughter but will be returning very soon  I changed patient's CODE STATUS to DNR CC as per patient's wishes which was witnessed by myself and RN Rima Rios  I ordered hospice consult as per patient's wishes  I offered comfort and emotional support to the patient and family    I met with primary care team resident Dr. Kurt Saravia and NP Jesus Bartholomew and updated them regarding my conversation with the patient as well as patient's daughter Sona Wallace  NP Jesus Bartholomew stated that we will respect patient's wishes and also have the  hospice facility for the patient    Education/support to staff  Education/support to family  Education/support to patient  Discharge planning/helping to coordinate care  Communications with primary service  Caregiver support/education  Code status clarified: Full Code  Code status clarified: [facility]  Code status clarified: [facility]  Other major issues    History of Present Illness     The patient is a 80 y.o.   Non- / non  female who presents with Fall and Leg Pain (bilateral)      Referred to Palliative Care by  [x] Physician   [] Nursing  [] Family Request   [] Other:       She was admitted to the trauma service for Fall at home, initial encounter [W19. XXXA, Y92.009]. Her hospital course has been associated with fall.  The patient has a complicated medical history and has been hospitalized since 1/30/2022 11:29 AM.    OVERNIGHT EVENTS:  No acute events overnight  Patient was extubated yesterday 2/2/2022  Temp 99.1     BP (!) 105/46   Pulse 92   Temp 99.1 °F (37.3 °C) (Oral)   Resp 13   Ht 5' 9\" (1.753 m)   Wt 264 lb (119.7 kg)   SpO2 96%   BMI 38.99 kg/m²     Past Medical History:   Diagnosis Date    Aortic stenosis     Arthritis     Atrial fibrillation (HCC)     CAD (coronary artery disease)     Carpal tunnel syndrome     CHF (congestive heart failure) (HCC)     Diabetes mellitus (HCC)     Dysphonia     GERD (gastroesophageal reflux disease)     Gout     Hiatal hernia     Hyperlipidemia     Hypertension     Kidney disease     Mitral valve insufficiency     Neuropathy     Osteopenia     Presence of Watchman left atrial appendage closure device     Sleep apnea     Thyroid disease     Urinary incontinence        Family History   Problem Relation Age of Onset    Heart Disease Mother        Social History     Tobacco Use    Smoking status: Never Smoker    Smokeless tobacco: Never Used   Vaping Use    Vaping Use: Unknown   Substance Use Topics    Alcohol use: Not Currently    Drug use: Not Currently       Assessment        REVIEW OF SYSTEMS    []   UNABLE TO OBTAIN:     Constitutional:  []   Chills   [x]  Fatigue   []  Fevers   []  Malaise   []  Weight loss   [x] Other: Tiredness    Respiratory:   []  Cough    []  Shortness of breath    []  Chest pain    [] Other:     Cardiovascular:   []  Chest pain  []  Dyspnea    []  Exertional chest pressure/discomfort     [] Fatigue      []  Palpitations    []  Syncope   [] Other:     Gastrointestinal:   []  Abdominal pain   []  Constipation    []  Diarrhea    []   Dysphagia   []  Reflux             []  Vomiting   [] Other:     Genitourinary:  [] Dysuria     []  Frequency   []  Hematuria   [] Nocturia   []  Urinary incontinence   [] Other:     Musculoskeletal:   [] Back pain    []  Muscle weakness   []  Myalgias    []  Neck pain   []  Stiff joints   []  Other:     Behavioral/Psych:   [] Anxiety    []  Depression     []  Mood swings   [] Other:     PHYSICAL ASSESSMENT:     General: [x]  Oriented x3      [] well appearing      [] Intubated      [] ill appearing      [] Other:    Mental Status: [x] normal mental status exam      [] drowsy      [] Confused      [] Other:     Cardiovascular: [x]  Regular rate/rhythm      [] Arrhythmia      [] Other:     Chest: [x] Effort normal      [] lungs clear      [] respiratory distress      [] Tachypnea      []  Other:    Abdomen: [] Soft/non-tender      [x]  Normal appearance      [] Distended      [] Ascites      [] Other:    Neurological: [x] Normal Speech      [] Normal Sensation      []  Deficits present:      Extremity:  [x] normal skin color/temp      [] clubbing/cyanosis      []  No edema      [] Other:     Palliative Performance Scale:  ___60%  Ambulation reduced; Significant disease; Can't do hobbies/housework; intake normal or reduced; occasional assist; LOC full/confusion  ___50%  Mainly sit/lie; Extensive disease; Can't do any work; Considerable assist; intake normal or reduced; LOC full/confusion  __x_40%  Mainly in bed; Extensive disease; Mainly assist; intake normal or reduced; LOC full/confusion   ___30%  Bed Bound; Extensive disease; Total care; intake reduced; LOCfull/confusion  ___20%  Bed Bound; Extensive disease; Total care; intake minimal; Drowsy/coma  ___10%  Bed Bound; Extensive disease;  Total care; Mouth care only; Drowsy/coma  ___0       Death           Principle Problem/Diagnosis:  Fall, initial encounter    Additional Assessments:  Active Problems:    Fall at home, initial encounter    Type III open fracture of both tibias    1- Symptom management/ pain control     Pain Assessment:  Pain is controlled with current analgesics. Medication(s) being used: acetaminophen, narcotic analgesics including oxycodone. Anxiety:  psychomotor agitation, racing thoughts, shortness of breath                          Dyspnea:  none                          Fatigue:  Tiredness and weakness    We feel the patient symptoms are being controlled. her current regimen is reviewed by myself and discussed with the staff. CODE STATUS changed to DNR CC  Hospice consult ordered as per patient's wishes    We will follow-up with hospice consult  We will continue to follow-up with the patient and family for further goals of care  We will continue to provide all comfort care measures to the patient  We will continue to provide comfort and support to the patient and family    2- Goals of care evaluation   The patient goals of care are accomplish a particular personal goal, spiritual needs, strengthening relationships, preserve independence/autonomy/control and support for family/caregiver   Goals of care discussed with:    [] Patient independently    [x] Patient and Family    [] Family or Healthcare DPOA independently    [] Unable to discuss with patient, family/DPOA not present    3- Code Status  DNR-CCA    4- Other recommendations  - We will continue to provide comfort and support to the patient and the family    Please call with any palliative questions or concerns. Palliative Care Team is available via perfect serve or via phone. Palliative Care will continue to follow Ms. Viveros's care as needed. The note has been dictated by dragon, typing errors may be a possibility     Thank you for allowing Palliative Care to participate in the care of Ms. Fior Erickson .        Electronically signed by   Kinsey Perez MD  Palliative Care Team  on 2/3/2022 at 10:04 AM    Palliative care office: [phone]

## 2022-02-03 NOTE — PROGRESS NOTES
ICU PROGRESS NOTE          PATIENT NAME: [address] RECORD NO. [id number]  DATE: 2/3/2022    PRIMARY CARE PHYSICIAN: Delgado Snow,     HD: # 4    ASSESSMENT    Patient Active Problem List   Diagnosis    Fall at home, initial encounter    Type III open fracture of both tibias       MEDICAL DECISION MAKING AND PLAN    1. Neuro:  1. Tylenol 1000 mg q8H, robaxin 750 mg q6H, lyrica 100 mg q8H, roxicodone 2.5 mg q8H  2. CV:  1. HR :84 - 131  2. MAP: 59 - 79  3. A. Fib w/ RVR   1. Received 125 mcg digoxin and 2.5 mg lopressor overnight   2. Cardiology recommending lopressor 12.5 mg BID   3. Lasix 40 mg x 1 given yesterday; will repeat today  4. BNP today   3. Heme:  1. Hgb: 7.6 (7.6)  2. Plt: 146 (159)  3. DVT ppx: heparin   4. Pulm:  1. Extubated 2/3/2  2. O2 via nasal cannula; wean for O2 saturations > 88%  3. Acapella and IS   5. Renal/lytes:  1. BUN/Cr: 30 (36) / 1.16 (1.75)  2. Na/K+: 140 (135) / 4.1 (3.8)  3. UOP: 0.7 cc/kg/hr over past 24 hours   6. GI:  1. Diet: TF @ goal   7. ID:  1. Tmax: 99.2 (37.3)  2. WBC: 8.7 (14.4)  8. MSK:  1. POD # 3 s/p b/l tibial IMN placement   2. WBAT   3. PT/OT   9. Endo:  1. BG; 194   2. HDSS: 15 units given over past 24 hours   3. Continue home synthroid 50 mcg daily   10. Lines:   1. Remove arterial line  2. KRYSTIANT, Ekaterina, PIV   11. Dispo:  1. Palliative to see and discuss with patient code status this morning, as patient would like to pursue comfort care     CHECKLIST    RASS: 0  RESTRAINTS: N/A  IVF: None   NUTRITION: TF @ goal   ANTIBIOTICS: N/A  GI: N/A  DVT: Heparin SQ   GLYCEMIC CONTROL: HDSS   HOB >45: Yes  WOUND CARE: bilateral ace wraps, dressing changes per orthopedic surgery     SUBJECTIVE    Lucila Ana Lilia was seen and examined at bedside. Patient extubated last evening. Remains on supplemental O2 via nasal cannula. Received 125 mcg of digoxin and 2.5 mg lopressor overnight for RVR.  Patient stating she would like to discuss code status today, as she would like to pursue comfort care. Will reach out to palliative. OBJECTIVE  VITALS: Temp: Temp: 99.1 °F (37.3 °C)Temp  Av °F (37.2 °C)  Min: 98.4 °F (36.9 °C)  Max: 99.8 °F (79.2 °C) BP Systolic (74GDP), YASMEEN:051 , Min:97 , BTY:724   Diastolic (26XHD), GJQ:93, Min:44, Max:56   Pulse Pulse  Av.6  Min: 84  Max: 146 Resp Resp  Av.2  Min: 3  Max: 28 Pulse ox SpO2  Av.1 %  Min: 89 %  Max: 100 %    GENERAL: awake, alert, no apparent distress   NEURO: awake, alert, following commands   HEENT: NCAT   LUNGS: no acute respiratory distress or accessory muscle use   HEART: irregular rhythm, rate in low 100's   ABDOMEN: soft, non-distended, non-tender   EXTERMITY: dressings/wraps to b/l LE clean, dry, and intact. Distal sensation intact     LAB:  CBC:   Recent Labs     22  0536   WBC 11.2 15.1* 14.4*   HGB 7.4* 8.0* 7.6*   HCT 22.2* 24.3* 23.0*   MCV 94.9 94.2 94.3   PLT See Reflexed IPF Result 132* 190     BMP:   Recent Labs     225 22  0536 22  0749 22  1907     --  136  --  135  --   --    K 4.2  --  4.1  --  3.8  --   --      --  99  --  100  --   --    CO2 24  --  23  --  23  --   --    BUN 33*  --  36*  --  36*  --   --    CREATININE 1.53*   < > 1.79*   < > 1.75* 1.81* 1.62*   GLUCOSE 135*  --  151*  --  181*  --   --     < > = values in this interval not displayed. RADIOLOGY:  XR CHEST PORTABLE    Result Date: 2/3/2022  EXAMINATION: ONE XRAY VIEW OF THE CHEST 2/3/2022 5:38 am COMPARISON: 2022 and 2022 HISTORY: ORDERING SYSTEM PROVIDED HISTORY: intubated TECHNOLOGIST PROVIDED HISTORY: intubated Reason for Exam: semiuprt port  post extubation FINDINGS: Nasogastric tube has been advanced and courses into the upper abdomen. There is a looping of the NG tube over the gastroesophageal region. Endotracheal tube has been removed. Left-sided pacemaker is present. Cardiac silhouette is enlarged. Opacification of the left lower chest is still present with obscuration of the diaphragm. Pulmonary vascular congestion is noted in the remaining lungs with interstitial prominence. No pneumothorax is identified. Endotracheal tube has been removed. Nasogastric tube advanced into the stomach but with a loop over the gastroesophageal region. Congestive changes and left basilar opacification are redemonstrated. XR CHEST PORTABLE    Result Date: 2/2/2022  EXAMINATION: ONE SUPINE X-RAY VIEW OF THE ABDOMEN; ONE X-RAY VIEW OF THE CHEST 2/1/2022 4:38 pm COMPARISON: 02/01/2022 HISTORY: ORDERING SYSTEM PROVIDED HISTORY: Confirmation of course of OG tube and location of tip of tube TECHNOLOGIST PROVIDED HISTORY: Confirmation of course of OG tube and location of tip of tube Portable? ->Yes FINDINGS: Endotracheal tube tip is approximately 4 cm above the elizabeth. Dual-chamber left subclavian pacemaker remains in place. Nasogastric tube tip has been advanced since the most recent comparison now projects in the left lower chest/retrocardiac region within the proximal aspect of a hiatal hernia. Stable cardiomegaly with mild central vessel prominence. Mild scattered perihilar and bibasilar atelectasis with no new focal lung consolidation. There are old left rib fractures. Degenerative and postoperative changes lower lumbar spine. Since the most recent comparison exam the nasogastric tube has been advanced with its tip now projecting in the left lower chest within a known hiatal hernia. The findings were sent to the Radiology Results [id number] at 5:40 pm on 2/1/2022to be communicated to a licensed caregiver. XR CHEST PORTABLE    Result Date: 2/2/2022  EXAMINATION: ONE XRAY VIEW OF THE CHEST 2/2/2022 6:00 am COMPARISON: Chest radiograph performed 02/01/2022.  HISTORY: ORDERING SYSTEM PROVIDED HISTORY: intubated TECHNOLOGIST PROVIDED HISTORY: intubated Reason for Exam: uprt port FINDINGS: There are bilateral mid and lower lung infiltrates. There is a left basilar effusion. There is no pneumothorax. The heart is enlarged. The upper abdomen is unremarkable. The extrathoracic soft tissues are unremarkable. There is an endotracheal tube the tip in the midtrachea. There is a gastric tube with the tip in the distal esophagus. Cardiomegaly with left basilar effusion and bilateral mid and lower lung infiltrates. XR ABDOMEN FOR NG/OG/NE TUBE PLACEMENT    Result Date: 2/2/2022  EXAMINATION: ONE SUPINE X-RAY VIEW OF THE ABDOMEN; ONE X-RAY VIEW OF THE CHEST 2/1/2022 4:38 pm COMPARISON: 02/01/2022 HISTORY: ORDERING SYSTEM PROVIDED HISTORY: Confirmation of course of OG tube and location of tip of tube TECHNOLOGIST PROVIDED HISTORY: Confirmation of course of OG tube and location of tip of tube Portable? ->Yes FINDINGS: Endotracheal tube tip is approximately 4 cm above the elizabeth. Dual-chamber left subclavian pacemaker remains in place. Nasogastric tube tip has been advanced since the most recent comparison now projects in the left lower chest/retrocardiac region within the proximal aspect of a hiatal hernia. Stable cardiomegaly with mild central vessel prominence. Mild scattered perihilar and bibasilar atelectasis with no new focal lung consolidation. There are old left rib fractures. Degenerative and postoperative changes lower lumbar spine. Since the most recent comparison exam the nasogastric tube has been advanced with its tip now projecting in the left lower chest within a known hiatal hernia. The findings were sent to the Radiology Results [id number] at 5:40 pm on 2/1/2022to be communicated to a licensed caregiver.      XR ABDOMEN FOR NG/OG/NE TUBE PLACEMENT    Result Date: 2/1/2022  EXAMINATION: ONE SUPINE XRAY VIEW(S) OF THE ABDOMEN 2/1/2022 3:01 pm COMPARISON: Same-day abdominal radiograph HISTORY: ORDERING SYSTEM PROVIDED HISTORY: Confirmation of course of OG tube and location of tip of tube TECHNOLOGIST PROVIDED HISTORY: Confirmation of course of OG tube and location of tip of tube Portable? ->Yes Reason for Exam: check ng placement port supine at 3pm FINDINGS: NG tube distal tip projects at the level of mid to distal esophagus and needs to be advanced significantly. .  Nonspecific bowel gas pattern without evidence of obstruction. No free intra-abdominal air is noted. No abnormal calcifications. No acute osseous abnormality. NG tube distal tip projects at the level of mid to distal esophagus. The findings were sent to the Radiology Results [id number] at 4:40 pm on 2/1/2022to be communicated to a licensed caregiver. IR PLACE NG TUBE BY DR Nolberto Miller    Result Date: 2/2/2022  PROCEDURE: XR PLACE NASOGASTRIC TUBE PHYS 2/2/2022 HISTORY: ORDERING SYSTEM PROVIDED HISTORY: hiatal hernia, difficult to pass OG/NG TECHNOLOGIST PROVIDED HISTORY: hiatal hernia, difficult to pass OG/NG Hiatal hernia CONTRAST: None SEDATION: None FLUOROSCOPY DOSE AND TYPE OR TIME AND EXPOSURES: Fluoro time 8.6 minutes DAP 2705.08 uGy cm 2 DESCRIPTION OF PROCEDURE: This procedure was performed by Alex RAMSEY under indirect supervision of . [place] protocol was observed. Under fluoroscopic guidance, through the left nostril, a 16 [place] [address] Hickman sump nasogastric tube was negotiated into the stomach using 035 guidewire to help past the hiatal hernia. With catheter and guidewire manipulation, the tip of the catheter was manipulated into the distal stomach past the hiatal hernia. Air bolus administration confirmed satisfactory tip position. The catheter was secured. Estimated blood loss was 0 mL. The patient tolerated the procedure well. Successful placement nasogastric tube.   Okay to use         Emilia Lopez DO  2/3/22, 6:26 AM

## 2022-02-03 NOTE — PROGRESS NOTES
Physician Progress Note      PATIENT:               Tomi Awan  CSN #:                  [id number]  :                       1933  ADMIT DATE:       2022 11:29 AM  DISCH DATE:  RESPONDING  PROVIDER #:        GABRIELLE BONILLA APRN - CNP          QUERY TEXT:    Pt admitted with Bilateral open tib/fib fracture and has CHF documented in   undated [address] [place] [address] Road list. If possible, please document in progress notes and discharge   summary further specificity regarding the type and acuity of CHF:    The medical record reflects the following:  Risk Factors: Age 80, Sleep apnea, AFib, CAD, Mitral valve insufficiency  Clinical Indicators: 2D ECHO- Normal LV size , moderately increased wall   thickness. Hyperdynamic wall motionsNormal LV systolic function with LVEF   >65%. Normal RV size and function. Pro-BNP 1768,  Treatment: IV lasix , 2D echo, monitoring    Thank you, Please call if questions  Yolanda Dean RN [facility]  [phone]  Options provided:  -- Chronic Systolic CHF/HFrEF  -- Chronic Diastolic CHF/HFpEF  -- Chronic Systolic and Diastolic CHF  -- Other - I will add my own diagnosis  -- Disagree - Not applicable / Not valid  -- Disagree - Clinically unable to determine / Unknown  -- Refer to Clinical Documentation Reviewer    PROVIDER RESPONSE TEXT:    This patient has chronic diastolic CHF/HFpEF. Query created by: Alban Sharma on 2/3/2022 7:41 AM      QUERY TEXT:    Pt admitted with Bilateral open tib/fib fracture and underwent bilateral ORIF   with fixation, and patient remains intubated since surgery on 2022. If   possible, please document in the progress notes and discharge summary if you   are evaluating and/or treating any of the following:     The medical record reflects the following:  Risk Factors: CHF, Sleep apnea, A fib, obesity  Clinical Indicators:  BMI 38, remains on vasopressors postoperatively, HGB 7   requiring transfusions, PF ratio 310, Intubated,ABG: pH 7.412, PCO2 39.0, PO2   93.4 , bicarb 24.8, Pulse 97 >146,  BMI 38.40 , Temp 99.8, Resp 30>28, bp   63/26>97/45, SpO2 83> 89>91 mechanical vent , Opens eyes to verbal, follows   commands,  Treatment: Intubated mechanical ventilation, PRVC FiO2 30% rate 16, PEEP 8,   tidal volume 470, supplemental O2 6L/min, Levophed gtt, Sedation-Precedex at   0.3 mcg/kg/hr, fentanyl gtt @ 100, monitoring    Thank you, Please call if questions  Yolanda Hamilton RN [facility]  [phone]  Options provided:  -- Acute pulmonary insufficiency following surgery  -- Remains intubated unrelated to pulmonary insufficiency, due to ## please   supply etiology, please supply etiology. -- Acute respiratory failure due to the Sleep apnea  -- Other - I will add my own diagnosis  -- Disagree - Not applicable / Not valid  -- Disagree - Clinically unable to determine / Unknown  -- Refer to Clinical Documentation Reviewer    PROVIDER RESPONSE TEXT:    This patient has acute postoperative pulmonary insufficiency.     Query created by: Guillermo Beach on 2/3/2022 8:59 AM      Electronically signed by:  Marylen Finger APRN - CNP 2/3/2022 9:15 AM

## 2022-02-03 NOTE — CONSULTS
Please see my consult note dated 2/1/22. I will be seeing the patient today.   Thank you for referring        Dr. Dunn [facility]

## 2022-02-03 NOTE — FLOWSHEET NOTE
Dr. Gilda Gay (Palliative Care) at bedside to round on patient. Patient states that she is \"very tired\" and is \"in so much pain\". Patient expresses desire for hospice to be contacted. She explains that she has been hurting now for some time. Patient explains how years ago her  was in the ICU at [facility], and then ended up receiving hospice care before he passed. Patient is alert and oriented x4, and CAM-negative. Patient gives Dr. Gilda Gay permission to call her daughter, Keshia Andrade. During phone call, Dr. Gilda Gay updates Keshia Andrade regarding conversation with patient and her wishes that she has been expressing to hospital staff. Keshia Andrade is very tearful, however states that she would like to respect her mother's wishes if that's what she truly wants. Per Keshia Andrade, she will be updating the rest of the family. Trauma resident, Dr. Neelima Ragland, notified of conversation and of change in code status to [facility] per Dr. Gilda Gay. Comfort care orders placed.    notified of change in status, as well as need for hospice referral.

## 2022-02-03 NOTE — CARE COORDINATION
Transitional planning    Received call from [facility], request for hospice referral, placed and will follow up.        1400 Writer spoke with Basilia Miranda and per patients daughter would like hospice closer to [place] [place] and [place] are both in area , discussed with daughter Gallito Arias via phone and she requests referrals sent on both, referrals placed will follow up, phone given to patient to talk to daughter. 1525 Received call from [place] at [facility] in regards to referral, wanted clinical info, call sent to primary RN Basilia Miranda. [facility] received call form [place] and is accepted at [facility], he will follow up with family. Attempt to call primary RN, left message to return call. Will need a signed DNR-CC.

## 2022-02-03 NOTE — PLAN OF CARE
Problem: Pain:  Goal: Pain level will decrease  Description: Pain level will decrease  2/3/2022 1116 by Alexander Herrera RN  Outcome: Ongoing  2/3/2022 0312 by Alexis Aguilar RN  Outcome: Ongoing  Goal: Control of acute pain  Description: Control of acute pain  2/3/2022 1116 by Alexander Herrera RN  Outcome: Ongoing  2/3/2022 0312 by Alexis Aguilar RN  Outcome: Ongoing  Goal: Control of chronic pain  Description: Control of chronic pain  2/3/2022 1116 by Alexander Herrera RN  Outcome: Ongoing  2/3/2022 0312 by Alexis Aguilar RN  Outcome: Ongoing     Problem: Falls - Risk of:  Goal: Will remain free from falls  Description: Will remain free from falls  2/3/2022 1116 by lAexander Herrera RN  Outcome: Ongoing  2/3/2022 0312 by Alexis Aguilar RN  Outcome: Ongoing  Goal: Absence of physical injury  Description: Absence of physical injury  2/3/2022 1116 by Alexander Herrera RN  Outcome: Ongoing  2/3/2022 0312 by Alexis Aguilar RN  Outcome: Ongoing     Problem: Skin Integrity:  Goal: Will show no infection signs and symptoms  Description: Will show no infection signs and symptoms  2/3/2022 1116 by Alexander Herrera RN  Outcome: Ongoing  2/3/2022 0312 by Alexis Aguilar RN  Outcome: Ongoing  Goal: Absence of new skin breakdown  Description: Absence of new skin breakdown  2/3/2022 1116 by Alexander Herrera RN  Outcome: Ongoing  2/3/2022 0312 by Alexis Aguilar RN  Outcome: Ongoing     Problem: OXYGENATION/RESPIRATORY FUNCTION  Goal: Patient will maintain patent airway  2/3/2022 1116 by Aleaxnder Herrera RN  Outcome: Ongoing  2/3/2022 0312 by Alexis Aguilar RN  Outcome: Ongoing  Goal: Patient will achieve/maintain normal respiratory rate/effort  Description: Respiratory rate and effort will be within normal limits for the patient  2/3/2022 1116 by Alexander Herrera RN  Outcome: Ongoing  2/3/2022 0312 by Alexis Aguilar RN  Outcome: Ongoing     Problem: MECHANICAL VENTILATION  Goal: Patient will maintain patent airway  2/3/2022 1116 by Gem Quintero RN  Outcome: Ongoing  2/3/2022 0312 by Vane Stout RN  Outcome: Ongoing  Goal: Oral health is maintained or improved  2/3/2022 1116 by Gem Quintero RN  Outcome: Ongoing  2/3/2022 0312 by Vane Stout RN  Outcome: Ongoing  Goal: ET tube will be managed safely  2/3/2022 1116 by Gem Quintero RN  Outcome: Ongoing  2/3/2022 0312 by Vane Stout RN  Outcome: Ongoing  Goal: Ability to express needs and understand communication  2/3/2022 1116 by Gem Quintero RN  Outcome: Ongoing  2/3/2022 0312 by Vane Stout RN  Outcome: Ongoing  Goal: Mobility/activity is maintained at optimum level for patient  2/3/2022 1116 by Gem Quintero RN  Outcome: Ongoing  2/3/2022 0312 by Vane Stout RN  Outcome: Ongoing     Problem: SKIN INTEGRITY  Goal: Skin integrity is maintained or improved  2/3/2022 1116 by Gem Quintero RN  Outcome: Ongoing  2/3/2022 0312 by Vane Stout RN  Outcome: Ongoing     Problem: Nutrition  Goal: Optimal nutrition therapy  2/3/2022 1116 by Gem Quintero RN  Outcome: Ongoing  2/3/2022 0312 by Vane Stout RN  Outcome: Ongoing     Problem: Confusion - Acute:  Goal: Absence of continued neurological deterioration signs and symptoms  Description: Absence of continued neurological deterioration signs and symptoms  2/3/2022 1116 by Gem Quintero RN  Outcome: Ongoing  2/3/2022 0312 by Vane Stout RN  Outcome: Ongoing  Goal: Mental status will be restored to baseline  Description: Mental status will be restored to baseline  2/3/2022 1116 by Gem Quintero RN  Outcome: Ongoing  2/3/2022 0312 by Vane Stout RN  Outcome: Ongoing     Problem: Discharge Planning:  Goal: Ability to perform activities of daily living will improve  Description: Ability to perform activities of daily living will improve  2/3/2022 1116 by Gem Quintero RN  Outcome: Ongoing  2/3/2022 0312 by Jaiden Mathias RN  Outcome: Ongoing  Goal: Participates in care planning  Description: Participates in care planning  2/3/2022 1116 by Rosalba Abarca RN  Outcome: Ongoing  2/3/2022 0312 by Jaiden Mathias RN  Outcome: Ongoing     Problem: Injury - Risk of, Physical Injury:  Goal: Will remain free from falls  Description: Will remain free from falls  2/3/2022 1116 by Rosalba Abarca RN  Outcome: Ongoing  2/3/2022 0312 by Jaiden Mathias RN  Outcome: Ongoing  Goal: Absence of physical injury  Description: Absence of physical injury  2/3/2022 1116 by Rosalba Abarca RN  Outcome: Ongoing  2/3/2022 0312 by Jaiden Mathias RN  Outcome: Ongoing     Problem: Mood - Altered:  Goal: Mood stable  Description: Mood stable  2/3/2022 1116 by Rosalba Abarca RN  Outcome: Ongoing  2/3/2022 0312 by Jaiden Mathias RN  Outcome: Ongoing  Goal: Absence of abusive behavior  Description: Absence of abusive behavior  2/3/2022 1116 by Rosalba Abarca RN  Outcome: Ongoing  2/3/2022 0312 by Jaiden Mathias RN  Outcome: Ongoing  Goal: Verbalizations of feeling emotionally comfortable while being cared for will increase  Description: Verbalizations of feeling emotionally comfortable while being cared for will increase  2/3/2022 1116 by Rosalba Abarca RN  Outcome: Ongoing  2/3/2022 0312 by Jaiden Mathias RN  Outcome: Ongoing     Problem: Psychomotor Activity - Altered:  Goal: Absence of psychomotor disturbance signs and symptoms  Description: Absence of psychomotor disturbance signs and symptoms  2/3/2022 1116 by Rosalba Abarca RN  Outcome: Ongoing  2/3/2022 0312 by Jaiden Mathias RN  Outcome: Ongoing     Problem: Sensory Perception - Impaired:  Goal: Demonstrations of improved sensory functioning will increase  Description: Demonstrations of improved sensory functioning will increase  2/3/2022 1116 by Rosalba Abarca RN  Outcome: Ongoing  2/3/2022 0312 by Diamond Cannon YUE Ngo  Outcome: Ongoing  Goal: Decrease in sensory misperception frequency  Description: Decrease in sensory misperception frequency  2/3/2022 1116 by Prisca Rivas RN  Outcome: Ongoing  2/3/2022 0312 by Payton Johansen RN  Outcome: Ongoing  Goal: Able to refrain from responding to false sensory perceptions  Description: Able to refrain from responding to false sensory perceptions  2/3/2022 1116 by Prisca Rivas RN  Outcome: Ongoing  2/3/2022 0312 by Payton Johansen RN  Outcome: Ongoing  Goal: Demonstrates accurate environmental perceptions  Description: Demonstrates accurate environmental perceptions  2/3/2022 1116 by Prisca Rivas RN  Outcome: Ongoing  2/3/2022 0312 by Payton Johansen RN  Outcome: Ongoing  Goal: Able to distinguish between reality-based and nonreality-based thinking  Description: Able to distinguish between reality-based and nonreality-based thinking  2/3/2022 1116 by Prisca Rivas RN  Outcome: Ongoing  2/3/2022 0312 by Payton Johansen RN  Outcome: Ongoing  Goal: Able to interrupt nonreality-based thinking  Description: Able to interrupt nonreality-based thinking  2/3/2022 1116 by Prisca Rivas RN  Outcome: Ongoing  2/3/2022 0312 by Payton Johansen RN  Outcome: Ongoing     Problem: Sleep Pattern Disturbance:  Goal: Appears well-rested  Description: Appears well-rested  2/3/2022 1116 by Prisca Rivas RN  Outcome: Ongoing  2/3/2022 0312 by Payton Johansen RN  Outcome: Ongoing     Problem: Non-Violent Restraints  Goal: Removal from restraints as soon as assessed to be safe  2/3/2022 1116 by Prisca Rivas RN  Outcome: Completed  2/3/2022 0312 by Payton Johansen RN  Outcome: Ongoing  Goal: No harm/injury to patient while restraints in use  2/3/2022 1116 by Prisca Rivas RN  Outcome: Completed  2/3/2022 0312 by Payton Johansen RN  Outcome: Ongoing  Goal: Patient's dignity will be maintained  2/3/2022 1116 by Prisca Rivas

## 2022-02-03 NOTE — FLOWSHEET NOTE
Assessment:  responded to patient's request for prayer and spiritual support. Patient was awake when  visited. Family was not present at the time. However,  spoke to patient's daughter, Jules Patel, on the phone. Patient expressed radha in God and was open to prayer. When asked how she was feeling, patient responded. \"okay but I would like to go to hospice. \" Patient said she was raised [employer] and a member of [employer]. Patient received sacrament of anointing of the sick. Intervention:  maintained listening presence, offered support and prayed with patient. Outcome: Patient expressed appreciation for the anointing and spiritual support she received. Plan: Follow up visits recommended for ongoing assessment of patient's condition and for more prayers and support. 02/03/22 1132   Encounter Summary   Services provided to: Patient; Family   Referral/Consult From: Patient   Support System Family members   Place of [address] Visiting   (02/03/2022)   Complexity of Encounter High   Length of Encounter 45 minutes   Spiritual Assessment Completed Yes   Routine   Type Follow up   Spiritual/Bahai   Type Spiritual support   Assessment Calm; Approachable   Intervention Active listening;Prayer; Anointing   Outcome Expressed gratitude   Sacraments   Sacrament of Sick-Anointing Anointed

## 2022-02-03 NOTE — FLOWSHEET NOTE
[employer] [employer] [address]  PROGRESS NOTE    Shift date: 02/03/2022  Shift day: Thursday   Shift # 1    Room # 5666/1478-56   Name: Autumn Ramirez            Age: 80 y.o. Gender: female          Taoism: [address] of Adventism: UNKNOWN    Referral: Routine Visit    Admit Date & Time: 1/30/2022 11:29 AM    PATIENT/EVENT DESCRIPTION:  Autumn Ramirez is a 80 y.o. female   Patient lying down in bed asleep and appeared comfortable. Nurse Wyatt Finders at bedside. Moving patient to [address] until hospice arrives per Nurse Wyatt Finders. SPIRITUAL ASSESSMENT/INTERVENTION:  ZOOM CALL FACILITATED and this  stayed for the zoom call because the patient was [facility] and the family needed help understanding the patient. Family was tearful. Several family members on the zoom call. Did two separate calls for the family. The patient and family were very appreciative. This  is going to call other grandson to try and have him connect with his grandmother. Erwin Cabrera is [facility]- phone number is [phone]. SPIRITUAL CARE FOLLOW-UP PLAN:  Chaplains will remain available to offer spiritual and emotional support as needed. Electronically signed by Leslye Valle on 2/3/2022 at 6:25 PM.  [facility]  [phone]     02/03/22 1821   Encounter Summary   Services provided to: Patient; Family  (ZOOM CALL FACILITATED)   Referral/Consult From: Family   Support System Children;Family members   Continue Visiting   (02/03/2022)   Complexity of Encounter High   Length of Encounter 1 hour   Spiritual Assessment Completed Yes   Routine   Type Follow up   Assessment Approachable;Grieving; Anxious; Coping  (ZOOM CALL- FAMILY ALSO FEELING LIKE THIS)   Intervention Active listening;Explored feelings, thoughts, concerns;Sustaining presence/ Ministry of presence  (ZOOM CALL)   Outcome Acceptance;Comfort;Expressed gratitude;Engaged in conversation;Coping;Less anxious, less agitated;Receptive;Venting emotion

## 2022-02-04 NOTE — PLAN OF CARE
Problem: Pain:  Goal: Pain level will decrease  Description: Pain level will decrease  Outcome: Ongoing  Goal: Control of acute pain  Description: Control of acute pain  Outcome: Ongoing  Goal: Control of chronic pain  Description: Control of chronic pain  Outcome: Ongoing     Problem: Falls - Risk of:  Goal: Will remain free from falls  Description: Will remain free from falls  Outcome: Ongoing  Goal: Absence of physical injury  Description: Absence of physical injury  Outcome: Ongoing     Problem: Skin Integrity:  Goal: Will show no infection signs and symptoms  Description: Will show no infection signs and symptoms  Outcome: Ongoing  Goal: Absence of new skin breakdown  Description: Absence of new skin breakdown  Outcome: Ongoing     Problem: OXYGENATION/RESPIRATORY FUNCTION  Goal: Patient will maintain patent airway  Outcome: Ongoing  Goal: Patient will achieve/maintain normal respiratory rate/effort  Description: Respiratory rate and effort will be within normal limits for the patient  Outcome: Ongoing     Problem: SKIN INTEGRITY  Goal: Skin integrity is maintained or improved  Outcome: Ongoing     Problem: Nutrition  Goal: Optimal nutrition therapy  Outcome: Ongoing     Problem: Confusion - Acute:  Goal: Absence of continued neurological deterioration signs and symptoms  Description: Absence of continued neurological deterioration signs and symptoms  Outcome: Ongoing  Goal: Mental status will be restored to baseline  Description: Mental status will be restored to baseline  Outcome: Ongoing     Problem: Discharge Planning:  Goal: Ability to perform activities of daily living will improve  Description: Ability to perform activities of daily living will improve  Outcome: Ongoing  Goal: Participates in care planning  Description: Participates in care planning  Outcome: Ongoing     Problem: Injury - Risk of, Physical Injury:  Goal: Will remain free from falls  Description: Will remain free from falls  Outcome: Ongoing  Goal: Absence of physical injury  Description: Absence of physical injury  Outcome: Ongoing     Problem: Mood - Altered:  Goal: Mood stable  Description: Mood stable  Outcome: Ongoing  Goal: Absence of abusive behavior  Description: Absence of abusive behavior  Outcome: Ongoing  Goal: Verbalizations of feeling emotionally comfortable while being cared for will increase  Description: Verbalizations of feeling emotionally comfortable while being cared for will increase  Outcome: Ongoing     Problem: Psychomotor Activity - Altered:  Goal: Absence of psychomotor disturbance signs and symptoms  Description: Absence of psychomotor disturbance signs and symptoms  Outcome: Ongoing     Problem: Sensory Perception - Impaired:  Goal: Demonstrations of improved sensory functioning will increase  Description: Demonstrations of improved sensory functioning will increase  Outcome: Ongoing  Goal: Decrease in sensory misperception frequency  Description: Decrease in sensory misperception frequency  Outcome: Ongoing  Goal: Able to refrain from responding to false sensory perceptions  Description: Able to refrain from responding to false sensory perceptions  Outcome: Ongoing  Goal: Demonstrates accurate environmental perceptions  Description: Demonstrates accurate environmental perceptions  Outcome: Ongoing  Goal: Able to distinguish between reality-based and nonreality-based thinking  Description: Able to distinguish between reality-based and nonreality-based thinking  Outcome: Ongoing  Goal: Able to interrupt nonreality-based thinking  Description: Able to interrupt nonreality-based thinking  Outcome: Ongoing     Problem: Sleep Pattern Disturbance:  Goal: Appears well-rested  Description: Appears well-rested  Outcome: Ongoing

## 2022-02-04 NOTE — PROGRESS NOTES
PROGRESS NOTE    PATIENT NAME: Shara Jones  MEDICAL RECORD NO. [id number]  DATE: 2022  PRIMARY CARE PHYSICIAN: Craig Wilson DO    HD: # 5  ASSESSMENT    Patient Active Problem List   Diagnosis    Fall at home, initial encounter    Type III open fracture of both tibias     MEDICAL DECISION MAKING AND PLAN  Open bl tib/fib fracture   -s/p repair with orthopedic surgery ()   -MMPT   -Home meds resumed   -Dispo planning  OK to dc hospice  She has requested to keep ng in for comfort. ..will discuss with her, otherwise if she has hunger and she is uncomfortable, prn bolus         SUBJECTIVE    Shara Jones is seen and examined. No acute events overnight. States she is doing ok this morning. Patient now [facility] and pursuing hospice care. OBJECTIVE  VITALS: Temp: Temp: 99.7 °F (37.6 °C)Temp  Av.5 °F (36.9 °C)  Min: 97.3 °F (36.3 °C)  Max: 99.7 °F (33.4 °C) BP Systolic (87HPB), TXS:019 , Min:99 , MG   Diastolic (09NHM), FVL:58, Min:33, Max:63   Pulse Pulse  Av.2  Min: 80  Max: 140 Resp Resp  Av.3  Min: 11  Max: 20 Pulse ox SpO2  Av.8 %  Min: 85 %  Max: 97 %  GENERAL: alert, no distress  NEURO: CNII-XII grossly intact, no focal neurological deficits    HEENT: atraumatic, normocephalic, sclera sclear, nose without deformity, trachea midline   LUNGS: normal effort, no accessory muscle use, no respiratory distress  HEART: regular rate and irregular rhythm   ABDOMEN: soft, nontender, nondistended  EXTREMITY: no cyanosis, motor intact to bl feet, BLE dressings CDI    I/O last 3 completed shifts:   In: 3377.1 [I.V.:1218.1; NG/GT:2159]  Out: 1893 [YZZQZ:9785]    Drain/tube output:  In: 2120.1 [I.V.:629.1; NG/GT:1491]  Out: 1320 [XAXTP:2986]    LAB:  CBC:   Recent Labs     22  0536 22  0800   WBC 14.4* 8.7   HGB 7.6* 7.6*   HCT 23.0* 23.6*   MCV 94.3 95.9    146     BMP:   Recent Labs     22  0536 22  0749 22  1907 22  0800     --   --  140 K 3.8  --   --  4.1     --   --  107   CO2 23  --   --  27   BUN 36*  --   --  30*   CREATININE 1.75* 1.81* 1.62* 1.16*   GLUCOSE 181*  --   --  194*     COAGS: No results for input(s): APTT, PROT, INR in the last 72 hours.

## 2022-02-04 NOTE — FLOWSHEET NOTE
SPIRITUAL CARE DEPARTMENT - [address]  PROGRESS NOTE    Shift date: 2/3/22  Shift day: Thursday   Shift # 2    Room # 9034/7604-74   Name: Yamil Morillo            Age: 80 y.o. Gender: female          Mandaen: Nel Leaas 33 of Congregation: [id number] [facility]    Referral: Routine Visit    Admit Date & Time: 1/30/2022 11:29 AM    PATIENT/EVENT DESCRIPTION:  Yamil Morillo is a 80 y.o. female in room 446 of ,      SPIRITUAL ASSESSMENT/INTERVENTION:   provided family conference with grandson and patient. Family are grateful to speak with their grandmother.  will give 30 minutes for the family to speak with the patient. SPIRITUAL CARE FOLLOW-UP PLAN:  Chaplains will remain available to offer spiritual and emotional support as needed. Electronically signed by Ralph Valentine, on 2/3/2022 at 7:36 PM.  [address]  [phone]       02/1934   Encounter Summary   Services provided to: Patient; Family   Referral/Consult From: Family   Support System Family members  (Grandchildren)   Place of Jainism   ([id number] [facility])   Continue Visiting   (2/3/22)   Complexity of Encounter High   Length of Encounter 30 minutes   Spiritual Assessment Completed Yes   Routine   Type Follow up   Assessment Approachable;Sleeping;Coping; Anxious; Helplessness   Intervention Active listening;Explored feelings, thoughts, concerns; Facilitated family conference;Sustaining presence/ Ministry of presence   Outcome Acceptance;Comfort;Receptive;Engaged in conversation  (sleeping)

## 2022-02-04 NOTE — PROGRESS NOTES
Called to pt room where pt and daughters/grandaughters present. Discussed with patient and she requests \"no hospice\" and that we provide full medical management of her condition and recovery except for intubation even to save her life. We will change status to DNR CCA and ask that she not be intubated  Will dc hospice placement planning and hospice pain management  Daughters in agreement. Patient verbalized independently.   Rns in room

## 2022-02-04 NOTE — PROGRESS NOTES
Palliative Care Progress Note    NAME:  Kamala Matos RECORD NUMBER:  [id number]  AGE: 80 y.o. GENDER: female  : 1933  TODAY'S DATE:  2022    Reason for Consult:  symptom management, pain management and family support    Plan        Palliative Interaction:  The patient was seen today, was lying comfortably in the bed, drowsy but opening eyes to name call and following simple commands  No family member was present in patient's room  I introduced myself to the patient and explained her the role of palliative care  Patient told that \" she is tired and to tell her daughter that she wants to go \"  I explained to the patient that she is on comfort care and all her symptoms will be taken care of  I also told the patient that she will be going under hospice care and patient nodded her acknowledgment  I offered comfort and emotional support to the patient  I met with patient's RN Peter Ochoa and discussed patient's current medical conditions with her  I reviewed patient's comfort care medications with YUE Ochoa and added the needful  I met with patient's DAWNA Morales and she informed me that patient has been accepted by [facility] and will be going there today evening    Education/support to staff  Education/support to family  Education/support to patient  Discharge planning/helping to coordinate care  Communications with primary service  Caregiver support/education  Pain management for comfort  Medications to decrease non-pain symptoms    History of Present Illness     The patient is a 80 y.o. Non- / non  female who presents with Fall and Leg Pain (bilateral)    Referred to Palliative Care by  [x] Physician   [] Nursing  [] Family Request   [] Other:       She was admitted to the trauma service for Fall at home, initial encounter [W19. XXXA, Y92.009]. Her hospital course has been associated with fall.  The patient has a complicated medical history and has been hospitalized since 1/30/2022 11:29 AM.    OVERNIGHT EVENTS:  No acute events overnight  Patient comfortable with comfort care   Temp 99.7     /63   Pulse 140   Temp 99.7 °F (37.6 °C) (Axillary)   Resp 20   Ht 5' 9\" (1.753 m)   Wt 264 lb (119.7 kg)   SpO2 95%   BMI 38.99 kg/m²     Past Medical History:   Diagnosis Date    Aortic stenosis     Arthritis     Atrial fibrillation (HCC)     CAD (coronary artery disease)     Carpal tunnel syndrome     CHF (congestive heart failure) (HCC)     Diabetes mellitus (HCC)     Dysphonia     GERD (gastroesophageal reflux disease)     Gout     Hiatal hernia     Hyperlipidemia     Hypertension     Kidney disease     Mitral valve insufficiency     Neuropathy     Osteopenia     Presence of Watchman left atrial appendage closure device     Sleep apnea     Thyroid disease     Urinary incontinence        Family History   Problem Relation Age of Onset    Heart Disease Mother        Social History     Tobacco Use    Smoking status: Never Smoker    Smokeless tobacco: Never Used   Vaping Use    Vaping Use: Unknown   Substance Use Topics    Alcohol use: Not Currently    Drug use: Not Currently       Assessment        REVIEW OF SYSTEMS    []   UNABLE TO OBTAIN:     Constitutional:  []   Chills   [x]  Fatigue   []  Fevers   []  Malaise   []  Weight loss   [x] Other: Tiredness, weakness    Respiratory:   []  Cough    []  Shortness of breath    []  Chest pain    [] Other:     Cardiovascular:   []  Chest pain  []  Dyspnea    []  Exertional chest pressure/discomfort     [] Fatigue      []  Palpitations    []  Syncope   [] Other:     Gastrointestinal:   []  Abdominal pain   []  Constipation    []  Diarrhea    []   Dysphagia   []  Reflux             []  Vomiting   [] Other:     Genitourinary:  []  Dysuria     []  Frequency   []  Hematuria   [] Nocturia   []  Urinary incontinence   [] Other:     Musculoskeletal:   [] Back pain    []  Muscle weakness   []  Myalgias    []  Neck pain []  Stiff joints   []  Other:     Behavioral/Psych:   [] Anxiety    []  Depression     []  Mood swings   [] Other:     PHYSICAL ASSESSMENT:     General: [x]  Oriented x3      [] well appearing      [] Intubated      [x] ill appearing      [] Other:    Mental Status: [x] normal mental status exam      [x] drowsy      [] Confused      [] Other:     Cardiovascular: [x]  Regular rate/rhythm      [] Arrhythmia      [] Other:     Chest: [x] Effort normal      [] lungs clear      [] respiratory distress      [] Tachypnea      []  Other:    Abdomen: [] Soft/non-tender      [x]  Normal appearance      [] Distended      [] Ascites      [] Other:    Neurological: [x] Normal Speech      [] Normal Sensation      []  Deficits present:      Extremity:  [x] normal skin color/temp      [] clubbing/cyanosis      []  No edema      [] Other:     Palliative Performance Scale:  ___60%  Ambulation reduced; Significant disease; Can't do hobbies/housework; intake normal or reduced; occasional assist; LOC full/confusion  ___50%  Mainly sit/lie; Extensive disease; Can't do any work; Considerable assist; intake normal or reduced; LOC full/confusion  ___40%  Mainly in bed; Extensive disease; Mainly assist; intake normal or reduced; LOC full/confusion   ___30%  Bed Bound; Extensive disease; Total care; intake reduced; LOCfull/confusion  ___20%  Bed Bound; Extensive disease; Total care; intake minimal; Drowsy/coma  __x_10%  Bed Bound; Extensive disease; Total care; Mouth care only; Drowsy/coma  ___0       Death           Principle Problem/Diagnosis:  Fall, initial encounter    Additional Assessments:  Active Problems:    Fall at home, initial encounter    Type III open fracture of both tibias    1- Symptom management/ pain control     Pain Assessment:  Pain is controlled with current analgesics. Medication(s) being used: acetaminophen, narcotic analgesics including morphine.                Anxiety:  fatigue                          Dyspnea: none                          Fatigue:  Tiredness and weakness    We feel the patient symptoms are being controlled. her current regimen is reviewed by myself and discussed with the staff. All comfort care medications reviewed and ordered the needful  We will continue to follow-up with the hospice consult     We will continue to provide all comfort care medications/measures to the patient  We will continue to provide comfort and support to the patient and family    2- Goals of care evaluation   The patient goals of care are provide comfort care/support/palliation/relieve suffering, accomplish a particular personal goal, preparation for death, achievement of a peaceful death, spiritual needs, strengthening relationships, preserve independence/autonomy/control and support for family/caregiver   Goals of care discussed with:    [x] Patient independently    [] Patient and Family    [] Family or Healthcare DPOA independently    [] Unable to discuss with patient, family/DPOA not present    3- Code Status  DNR-CC    4- Other recommendations  - We will continue to provide comfort and support to the patient and the family    Please call with any palliative questions or concerns. Palliative Care Team is available via perfect serve or via phone. Palliative Care will continue to follow Ms. Viveros's care as needed. The note has been dictated by dragon, typing errors may be a possibility     Thank you for allowing Palliative Care to participate in the care of Ms. Carol Manriquez .        Electronically signed by   Jaison Almanza MD  Palliative Care Team  on 2/4/2022 at 11:34 AM    Palliative care office: [phone]

## 2022-02-04 NOTE — CARE COORDINATION
Transitional Planning    Spoke with [facility]. They are able to accept patient at this in-patient unit on the campus of [facility]. States they have open bed for her today. Transport ETA [id number] per [employer]. [address] Sh[address] Roberts at [facility] of transport time, he is agreeable. [address], daughter, of transport time    # for Report [phone]    1445 Updated that pt no longer wishes to pursue hospice, requesting SNF placement. [address]     1444 cancelled transport    1530 family not at bedside, pt sleeping, SNF list left at bedside

## 2022-02-04 NOTE — PROGRESS NOTES
Physician Progress Note      PATIENT:               Miller Joshi  CSN #:                  [id number]  :                       1933  ADMIT DATE:       2022 11:29 AM  DISCH DATE:  RESPONDING  PROVIDER #:        Casimiro Walsh CNP          QUERY TEXT:    Pt admitted with  bilateral open tib/fib fractures . Pt noted to have   hypotension post surgically and placed on pressors for 3 days now . Prior   shock question unable to be determined by resident. If possible, please   document in the progress notes and discharge summary if you are evaluating   and/or treating any of the following: The medical record reflects the following:  Risk Factors: Age 80, bilateral open tib/fib fractures, surgery for fractures  Clinical Indicators: SpO2 83>86>93>91>95, Temp 100.0, HR 97>127>136>122,resp   30>25,BP 63/26, MAP 57-77, BNP 1768, HGb 12.2>7.0 ,Opens eyes to verbal,   follows commands, per  trauma note- Overnight, the patient was having   intermittent hypotension, ultimately requiring levophed to maintain maps   greater than 65. Currently in A Fib w/ RVR. Treatment: Levophed and Vasopressin IV , Digoxin 125mg , precedex IV ,   lactated ringers [id number] mL/hr    Thank you, Please call if questions  Yolanda Samuel RN CDS  [phone]  Options provided:  -- Hemorrhagic Shock  -- cardiogenic shock  -- Hypovolemic Shock  -- Hypotensive Shock  -- Other - I will add my own diagnosis  -- Disagree - Not applicable / Not valid  -- Disagree - Clinically unable to determine / Unknown  -- Refer to Clinical Documentation Reviewer    PROVIDER RESPONSE TEXT:    No shock    Query created by: Russel Prader on 2022 1:58 PM      Electronically signed by:  Casimiro Walsh CNP 2022 2:18 PM

## 2022-02-05 NOTE — PROGRESS NOTES
Spoke to patient and all siblings in the room today. POA was present at bedside. Both patient and POA agreeable to return to a DNR CC status. Patient wants to remain comfortable and have no life saving measures done. They understood the difference between CCA and CC that was explained to them multiple occasions now and would like to be CC. Patient wanting NGT removed. Page sent to palliative care and theyre unable to see patient until tomorrow. Hospice was contacted by Sue Nieto RN and they arent able to evaluate her again until Monday. Will remove NGT and keep patient comfortable. Patient RN notified and appropriate paper filled out.     Electronically signed by Mariela Bragg DO on 2/5/2022 at 1:49 PM

## 2022-02-05 NOTE — PLAN OF CARE
Problem: Pain:  Goal: Pain level will decrease  Description: Pain level will decrease  Outcome: Ongoing  Goal: Control of acute pain  Description: Control of acute pain  Outcome: Ongoing  Goal: Control of chronic pain  Description: Control of chronic pain  Outcome: Ongoing     Problem: Falls - Risk of:  Goal: Will remain free from falls  Description: Will remain free from falls  Outcome: Ongoing  Goal: Absence of physical injury  Description: Absence of physical injury  Outcome: Ongoing     Problem: Skin Integrity:  Goal: Will show no infection signs and symptoms  Description: Will show no infection signs and symptoms  Outcome: Ongoing  Goal: Absence of new skin breakdown  Description: Absence of new skin breakdown  Outcome: Ongoing     Problem: OXYGENATION/RESPIRATORY FUNCTION  Goal: Patient will maintain patent airway  Outcome: Ongoing  Goal: Patient will achieve/maintain normal respiratory rate/effort  Description: Respiratory rate and effort will be within normal limits for the patient  Outcome: Ongoing     Problem: MECHANICAL VENTILATION  Goal: Patient will maintain patent airway  Outcome: Ongoing  Goal: Oral health is maintained or improved  Outcome: Ongoing  Goal: ET tube will be managed safely  Outcome: Ongoing  Goal: Ability to express needs and understand communication  Outcome: Ongoing  Goal: Mobility/activity is maintained at optimum level for patient  Outcome: Ongoing     Problem: SKIN INTEGRITY  Goal: Skin integrity is maintained or improved  Outcome: Ongoing     Problem: Nutrition  Goal: Optimal nutrition therapy  Outcome: Ongoing     Problem: Confusion - Acute:  Goal: Absence of continued neurological deterioration signs and symptoms  Description: Absence of continued neurological deterioration signs and symptoms  Outcome: Ongoing  Goal: Mental status will be restored to baseline  Description: Mental status will be restored to baseline  Outcome: Ongoing     Problem: Discharge Planning:  Goal: Ability to perform activities of daily living will improve  Description: Ability to perform activities of daily living will improve  Outcome: Ongoing  Goal: Participates in care planning  Description: Participates in care planning  Outcome: Ongoing     Problem: Injury - Risk of, Physical Injury:  Goal: Will remain free from falls  Description: Will remain free from falls  Outcome: Ongoing  Goal: Absence of physical injury  Description: Absence of physical injury  Outcome: Ongoing     Problem: Mood - Altered:  Goal: Mood stable  Description: Mood stable  Outcome: Ongoing  Goal: Absence of abusive behavior  Description: Absence of abusive behavior  Outcome: Ongoing  Goal: Verbalizations of feeling emotionally comfortable while being cared for will increase  Description: Verbalizations of feeling emotionally comfortable while being cared for will increase  Outcome: Ongoing     Problem: Psychomotor Activity - Altered:  Goal: Absence of psychomotor disturbance signs and symptoms  Description: Absence of psychomotor disturbance signs and symptoms  Outcome: Ongoing     Problem: Sensory Perception - Impaired:  Goal: Demonstrations of improved sensory functioning will increase  Description: Demonstrations of improved sensory functioning will increase  Outcome: Ongoing  Goal: Decrease in sensory misperception frequency  Description: Decrease in sensory misperception frequency  Outcome: Ongoing  Goal: Able to refrain from responding to false sensory perceptions  Description: Able to refrain from responding to false sensory perceptions  Outcome: Ongoing  Goal: Demonstrates accurate environmental perceptions  Description: Demonstrates accurate environmental perceptions  Outcome: Ongoing  Goal: Able to distinguish between reality-based and nonreality-based thinking  Description: Able to distinguish between reality-based and nonreality-based thinking  Outcome: Ongoing  Goal: Able to interrupt nonreality-based thinking  Description: Able to interrupt nonreality-based thinking  Outcome: Ongoing     Problem: Sleep Pattern Disturbance:  Goal: Appears well-rested  Description: Appears well-rested  Outcome: Ongoing

## 2022-02-05 NOTE — CARE COORDINATION
Received a call from RN on the floor informing me that the family has now decided to change patient back to CC and again pursue Hospice. [facility] and was informed that they will not be able to have a RN until Monday to admit to Hospice. [address] to patients daughter Steph Spangler at bedside I informed them that [facility] needs to reevaluate there mother. Steph Spangler asks if I could also send a referral to [facility] as they live the same distance between the two. I informed her that I will resend clinicals to [facility] and send referral and clinicals to [facility]. Called [facility] to inform them of referral spoke to Mike Villarreal she gave me a different fax [phone] faxed clicicals to that fax.

## 2022-02-05 NOTE — CARE COORDINATION
Received a call from RN on the floor informing me that the family has now decided to change patient back to CC and again pursue Hospice. [facility] and was informed that they will not be able to have a RN until Monday to admit to Hospice. [address] to patients daughter Skye Lynch at bedside I informed them that [facility] needs to reevaluate there mother. Skye Lynch asks if I could also send a referral to [facility] as they live the same distance between the two. I informed her that I will resend clinicals to [facility] and send referral and clinicals to [facility].

## 2022-02-05 NOTE — PROGRESS NOTES
140 137 144   K 4.1 4.3 4.8    102 108*   CO2 27 30 30   BUN 30* 35* 36*   CREATININE 1.16* 1.12* 1.01*   GLUCOSE 194* 182* 213*     COAGS: No results for input(s): APTT, PROT, INR in the last 72 hours. Attending Note      I have reviewed the above [facility] note(s) and I either performed the key elements of the medical history and physical exam or was present with the trauma resident when the key elements of the medical history and physical exam were performed. I have discussed the findings, established the care plan and recommendations with the trauma team.  Edematous, will give lasix. Wants NG out, no further therapy, no feeding tubes. No resus. Will attempt to clarify code status.     Fabiola Dash MD  2/5/2022  11:03 AM

## 2022-02-05 NOTE — PROGRESS NOTES
Transfer order received. Report called to [facility] on 4B. All questions answered. Patient transferred via bed while hooked up to portable telemetry unit with all belongings.

## 2022-02-05 NOTE — CARE COORDINATION
Dispo planning     Code status changed to DRN-CC. Updated Tawana TONEY. Per DAWNA, [facility] will not be able to come reeval for admission until Monday 2/7.

## 2022-02-05 NOTE — PLAN OF CARE
Problem: Pain:  Goal: Pain level will decrease  Description: Pain level will decrease  2/4/2022 2257 by Josie Mullins RN  Outcome: Met This Shift  2/4/2022 1917 by Lore Bajwa RN  Outcome: Ongoing  Goal: Control of acute pain  Description: Control of acute pain  2/4/2022 2257 by Josie Mullins RN  Outcome: Met This Shift  2/4/2022 1917 by Lore Bajwa RN  Outcome: Ongoing  Goal: Control of chronic pain  Description: Control of chronic pain  2/4/2022 2257 by Josie Mullins RN  Outcome: Met This Shift  2/4/2022 1917 by Lore Bajwa RN  Outcome: Ongoing     Problem: Falls - Risk of:  Goal: Will remain free from falls  Description: Will remain free from falls  2/4/2022 2257 by Josie Mullins RN  Outcome: Met This Shift  2/4/2022 1917 by Lore Bajwa RN  Outcome: Ongoing  Goal: Absence of physical injury  Description: Absence of physical injury  2/4/2022 2257 by Josie Mullins RN  Outcome: Met This Shift  2/4/2022 1917 by Lore Bajwa RN  Outcome: Ongoing     Problem: Skin Integrity:  Goal: Will show no infection signs and symptoms  Description: Will show no infection signs and symptoms  2/4/2022 2257 by Josie Mullins RN  Outcome: Met This Shift  2/4/2022 1917 by Lore Bajwa RN  Outcome: Ongoing  Goal: Absence of new skin breakdown  Description: Absence of new skin breakdown  2/4/2022 2257 by Josie Mullins RN  Outcome: Met This Shift  2/4/2022 1917 by Lore Bajwa RN  Outcome: Ongoing     Problem: OXYGENATION/RESPIRATORY FUNCTION  Goal: Patient will maintain patent airway  2/4/2022 2257 by Josie Mullins RN  Outcome: Met This Shift  2/4/2022 1917 by Lore Bajwa RN  Outcome: Ongoing  Goal: Patient will achieve/maintain normal respiratory rate/effort  Description: Respiratory rate and effort will be within normal limits for the patient  2/4/2022 2257 by Josie Mullins RN  Outcome: Met This Shift  2/4/2022 1917 by Lore Bajwa RN  Outcome: Ongoing     Problem: MECHANICAL VENTILATION  Goal: Patient will maintain patent airway  2/4/2022 2257 by Karthik Corcoran RN  Outcome: Met This Shift  2/4/2022 1917 by Pastora Pedraza RN  Outcome: Ongoing  Goal: Oral health is maintained or improved  2/4/2022 2257 by Karthik Corcoran RN  Outcome: Met This Shift  2/4/2022 1917 by Pastora Pedraza RN  Outcome: Ongoing  Goal: ET tube will be managed safely  2/4/2022 2257 by Karthik Corcoran RN  Outcome: Met This Shift  2/4/2022 1917 by Pastora Pedraza RN  Outcome: Ongoing  Goal: Ability to express needs and understand communication  [phone] [id number] by Karthik Corcoran RN  Outcome: Met This Shift  2/4/2022 1917 by Pastora Pedraza RN  Outcome: Ongoing  Goal: Mobility/activity is maintained at optimum level for patient  2/4/2022 2257 by Karthik Corcoran RN  Outcome: Met This Shift  2/4/2022 1917 by Pastora Pedraza RN  Outcome: Ongoing     Problem: SKIN INTEGRITY  Goal: Skin integrity is maintained or improved  2/4/2022 2257 by Karthik Corcoran RN  Outcome: Met This Shift  2/4/2022 1917 by Pastora Pedraza RN  Outcome: Ongoing     Problem: Nutrition  Goal: Optimal nutrition therapy  2/4/2022 2257 by Karthik Corcoran RN  Outcome: Met This Shift  2/4/2022 1917 by Pastora Pedraza RN  Outcome: Ongoing     Problem: Confusion - Acute:  Goal: Absence of continued neurological deterioration signs and symptoms  Description: Absence of continued neurological deterioration signs and symptoms  2/4/2022 2257 by Karthik Corcoran RN  Outcome: Met This Shift  2/4/2022 1917 by Pastora Pedraza RN  Outcome: Ongoing  Goal: Mental status will be restored to baseline  Description: Mental status will be restored to baseline  2/4/2022 2257 by Karthik Corcoran RN  Outcome: Met This Shift  2/4/2022 1917 by Pastora Pedraza RN  Outcome: Ongoing     Problem: Discharge Planning:  Goal: Ability to perform activities of daily living will improve  Description: Ability to perform activities of daily living will improve  2/4/2022 2257 by Karthik Corcoran RN  Outcome: Met This Shift  2/4/2022 1917 by Lore Bajwa RN  Outcome: Ongoing  Goal: Participates in care planning  Description: Participates in care planning  2/4/2022 2257 by Josie Mullins RN  Outcome: Met This Shift  2/4/2022 1917 by Lore Bajwa RN  Outcome: Ongoing     Problem: Injury - Risk of, Physical Injury:  Goal: Will remain free from falls  Description: Will remain free from falls  2/4/2022 2257 by Josie Mullins RN  Outcome: Met This Shift  2/4/2022 1917 by Lore Bajwa RN  Outcome: Ongoing  Goal: Absence of physical injury  Description: Absence of physical injury  2/4/2022 2257 by Josie Mullins RN  Outcome: Met This Shift  2/4/2022 1917 by Lore Bajwa RN  Outcome: Ongoing     Problem: Mood - Altered:  Goal: Mood stable  Description: Mood stable  2/4/2022 2257 by Josie Mullins RN  Outcome: Met This Shift  2/4/2022 1917 by Lore Bajwa RN  Outcome: Ongoing  Goal: Absence of abusive behavior  Description: Absence of abusive behavior  2/4/2022 2257 by Josie Mullins RN  Outcome: Met This Shift  2/4/2022 1917 by Lore Bajwa RN  Outcome: Ongoing  Goal: Verbalizations of feeling emotionally comfortable while being cared for will increase  Description: Verbalizations of feeling emotionally comfortable while being cared for will increase  2/4/2022 2257 by Josie Mullins RN  Outcome: Met This Shift  2/4/2022 1917 by Lore Bajwa RN  Outcome: Ongoing     Problem: Psychomotor Activity - Altered:  Goal: Absence of psychomotor disturbance signs and symptoms  Description: Absence of psychomotor disturbance signs and symptoms  2/4/2022 2257 by Josie Mullins RN  Outcome: Met This Shift  2/4/2022 1917 by Lore Bajwa RN  Outcome: Ongoing     Problem: Sensory Perception - Impaired:  Goal: Demonstrations of improved sensory functioning will increase  Description: Demonstrations of improved sensory functioning will increase  2/4/2022 2257 by Josie Mullins RN  Outcome: Met This Shift  2/4/2022 1917 by Jerri Johnson YUE Echols  Outcome: Ongoing  Goal: Decrease in sensory misperception frequency  Description: Decrease in sensory misperception frequency  2/4/2022 2257 by Afshin Renner RN  Outcome: Met This Shift  2/4/2022 1917 by Abhinav Low RN  Outcome: Ongoing  Goal: Able to refrain from responding to false sensory perceptions  Description: Able to refrain from responding to false sensory perceptions  2/4/2022 2257 by Afshin Renner RN  Outcome: Met This Shift  2/4/2022 1917 by Abhinav Low RN  Outcome: Ongoing  Goal: Demonstrates accurate environmental perceptions  Description: Demonstrates accurate environmental perceptions  2/4/2022 2257 by Afshin Renner RN  Outcome: Met This Shift  2/4/2022 1917 by Abhinav Low RN  Outcome: Ongoing  Goal: Able to distinguish between reality-based and nonreality-based thinking  Description: Able to distinguish between reality-based and nonreality-based thinking  2/4/2022 2257 by Afshin Renner RN  Outcome: Met This Shift  2/4/2022 1917 by Abhinav Low RN  Outcome: Ongoing  Goal: Able to interrupt nonreality-based thinking  Description: Able to interrupt nonreality-based thinking  2/4/2022 2257 by Afshin Renner RN  Outcome: Met This Shift  2/4/2022 1917 by Abhinav Low RN  Outcome: Ongoing     Problem: Sleep Pattern Disturbance:  Goal: Appears well-rested  Description: Appears well-rested  2/4/2022 2257 by Afshin Renner RN  Outcome: Met This Shift  2/4/2022 1917 by Abhinav Low RN  Outcome: Ongoing

## 2022-02-06 NOTE — FLOWSHEET NOTE
[facility]   Patient Death Note  DEATH   Shift date: 22    Shift day:   Shift #: 1                 Room # 5505/9028-85   Name: Ev Taylor            Age: 80 y.o. Gender: female          Church: No Taoist on file      Place of [facility]: Unknown  Admit Date & Time: 2022 11:29 AM     Referral: Nurse Samantha Henderson  Actual date of death: 22   TOD: 12:18PM       SITUATION AT DEATH:  Cardiac arrest     IS THIS A 'S CASE? Yes    SPIRITUAL/EMOTIONAL INTERVENTION:   followed up with referral from nurse to provide care to family of the . The  writing the note provided grief care to family by reciting prayers and blessings for the  patient. Family Received Grief Packet? Yes       NAME AND PHONE NUMBER OF DOCTOR SIGNING DEATH CERTIFICATE:  (full name) Dr. Gaston Melton (phone) [phone] is not contacting the  home because it is a 's case. Copy of COMPLETED Release of Body Form Received? Yes    Patient's belongings: With family     HOME:  Name: [address], [address]  Phone Number: [phone]     NEXT OF KIN:  Name: Tiffanie Galvan  Relationship: Daughter  Street Address: [address] [place] [address] Road: [facility]: [place]  Zip code: [id number]   Phone Number: [phone]    [facility]? No    IF SO, WHAT? None. Electronically signed by Sukh Lim Resident, on 2022 at 1:34 PM.  [facility]  [phone]       22 1331   Encounter Summary   Services provided to: Patient; Family   Referral/Consult From: Nurse   Support System Children;Family members   [facility]   ([id number] [address] [facility])   Continue Visiting   (22)   Complexity of Encounter High   Length of Encounter 30 minutes   Spiritual Assessment Completed Yes   Grief and Life Adjustment Type Death   Assessment Unable to respond;Tearful; Anxious;Grieving   Intervention Active listening;Explored feelings, thoughts, concerns;Nurtured hope;Prayer;Sustaining presence/ Ministry of presence; End of life care;Grief care   Outcome Comfort;Expressed gratitude;Engaged in conversation; Hopeful;Receptive;Venting emotion;Did not respond

## 2022-02-06 NOTE — CARE COORDINATION
Transitional Planning     Left VM for Chyna Verde at [facility]. Spoke with Jaison Ramos at [facility] [phone]; reviewing patient's case; will call back once they have a decision.

## 2022-02-06 NOTE — DISCHARGE SUMMARY
DISCHARGE SUMMARY:    PATIENT NAME:  Autumn Ramirez  YOB: 1933  MEDICAL RECORD NO. [id number]  DATE: 02/06/22  PRIMARY CARE PHYSICIAN: Doroteo Cunha DO  ADMIT DATE: 1/30/2022  DISPOSITION:  2/6/2022  DISCHARGE DATE:   2/6/2022  ADMITTING DIAGNOSIS:   [unfilled]    DIAGNOSIS:   Patient Active Problem List   Diagnosis    Fall at home, initial encounter    Type III open fracture of both tibias       CONSULTANTS:  Orthopedic surgery, palliative care     PROCEDURES:   Debridement left open tibia fracture with IMN, Debridement right open tibia fracture with IMN, removal of hardware R ankle    HOSPITAL COURSE:   Autumn Ramirez is a 80 y.o. female who was admitted on 2/6/2022 with bilateral tibia fractures. She underwent debridement of left open tibia fracture and IMN and debridement of R open tibia fracture with IMN with removal of R ankle hardware 1/31/2022. She was admitted to ICU postop intubated. Labs and imaging were followed daily. She was successfully extubated. The patient and family decided on [facility] code status and the patient passed on 2/6/2022. PHYSICAL EXAMINATION:        Discharge Vitals:  height is 5' 9\" (1.753 m) and weight is 264 lb (119.7 kg). Her axillary temperature is 100.7 °F (38.2 °C). Her blood pressure is 131/73 and her pulse is 0 (abnormal). Her respiration is 14 and oxygen saturation is 85% (abnormal).    General appearance - pale, no resp effort, no movement   Chest - no breath sounds on auscultation   Heart - no heart beat  Abdomen - soft, nondistended, obese  Neurological - no movements of any extremities     LABS:     Recent Labs     02/04/22  1648 02/05/22  0734   WBC 9.1 11.0   HGB 8.4* 8.9*   HCT 26.2* 28.5*    180    144   K 4.3 4.8    108*   CO2 30 30   BUN 35* 36*   CREATININE 1.12* 1.01*     DIAGNOSTIC TESTS:    XR SHOULDER RIGHT (MIN 2 VIEWS)    Result Date: 1/30/2022  EXAMINATION: THREE XRAY VIEWS OF THE RIGHT SHOULDER 1/30/2022 11:06 pm COMPARISON: None. HISTORY: ORDERING SYSTEM PROVIDED HISTORY: right shoulder pain TECHNOLOGIST PROVIDED HISTORY: right shoulder pain Reason for Exam: fall,pain FINDINGS: No acute displaced fracture traumatic malalignment. Mild-to-moderate osteoarthritic changes. Degenerate change without evidence acute fracture dislocation. XR KNEE RIGHT (3 VIEWS)    Result Date: 2/3/2022  EXAMINATION: THREE XRAY VIEWS OF THE RIGHT KNEE 1/30/2022 4:51 pm COMPARISON: Right knee radiograph performed January 30, 2022. HISTORY: ORDERING SYSTEM PROVIDED HISTORY: Surgical planning - marker ball please TECHNOLOGIST PROVIDED HISTORY: Please take films with marker ball in view for surgery planning. Thanks! Surgical planning - marker ball please FINDINGS: Right knee arthroplasty noted. Hardware is intact. No acute osseous abnormality. Cast material is in place. Right knee arthroplasty with intact hardware. XR KNEE RIGHT (3 VIEWS)    Result Date: 1/30/2022  EXAMINATION: THREE XRAY VIEWS OF THE RIGHT KNEE 1/30/2022 9:56 am COMPARISON: None. HISTORY: ORDERING SYSTEM PROVIDED HISTORY: trauma TECHNOLOGIST PROVIDED HISTORY: trauma FINDINGS: There is a total knee arthroplasty . No acute fracture or dislocation. No acute hardware failure or loosening. Grossly normal alignment. There is a joint effusion. 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 XR TIBIA FIBULA LEFT (2 VIEWS)    Result Date: 2/3/2022  EXAMINATION: TWO XRAY VIEWS OF THE LEFT TIBIA AND FIBULA; TWO XRAY VIEWS OF THE RIGHT TIBIA AND FIBULA; THREE XRAY VIEWS OF THE LEFT FOOT; THREE XRAY VIEWS OF THE RIGHT FOOT 1/30/2022 12:55 pm COMPARISON: Right and left CT tibia/fibula performed 01/30/2022. HISTORY: ORDERING SYSTEM PROVIDED HISTORY: trauma TECHNOLOGIST PROVIDED HISTORY: trauma FINDINGS: LEFT TIBIA/FIBULA: Acute displaced fracture of the distal tibial diaphysis which demonstrates improved alignment and angulation.   There is a comminuted and displaced acute fracture of the distal fibular diaphysis with alignment grossly unchanged. Nondisplaced fracture of the tip of the lateral malleolus is better evaluated on prior CT. Diffuse osteopenia. Cast material is in place. LEFT FOOT: Cast material is in place which partially obscures evaluation of the soft tissues and osseous structures. Diffuse osteopenia. No definite acute displaced fracture demonstrated. RIGHT TIBIA/FIBULA/ANKLE: Right knee arthroplasty. Hardware is grossly intact. There is an acute displaced fracture of the distal tibial diaphysis. Alignment is grossly unchanged from prior examination. There is an acute segmental fracture of the mid/distal fibular diaphysis, alignment grossly unchanged. There is screw and plate fixation of the distal tibia/fibula in the mediolateral malleoli. Hardware appears grossly intact. The talar dome appears grossly intact. Cast material is in place. RIGHT FOOT: Cast material is in place which partially obscures evaluation of the soft tissues and osseous structures. Diffuse osteopenia. No definite acute displaced fracture demonstrated. 1. Acute displaced fracture of the left distal tibial diaphysis with improved alignment. 2. Comminuted and displaced fracture of the left distal fibular diaphysis, alignment grossly unchanged. 3. Nondisplaced fracture of the tip of the left lateral malleolus, better evaluated on prior CT. 4. Cast material is in place on the left. 5. Acute displaced fracture of the right distal tibial diaphysis, alignment grossly unchanged. 6. Acute segmental fracture of the mid/distal right fibula, alignment grossly unchanged. 7. Right knee arthroplasty with intact hardware. 8. Cast material in place on the right. XR TIBIA FIBULA LEFT (2 VIEWS)    Result Date: 1/31/2022  EXAMINATION: 4 XRAY VIEWS OF THE LEFT TIBIA AND FIBULA 1/31/2022 7:27 pm COMPARISON: Left tib fib radiographs performed 01/30/2022.  HISTORY: ORDERING SYSTEM PROVIDED HISTORY: S/p IMN Left tibia TECHNOLOGIST PROVIDED HISTORY: AP, lateral images left tib/fib please. Patient heading to TICU post-op S/p IMN Left tibia FINDINGS: There is hardware fixation of the distal tibial fracture with intramedullary cielo. There is no hardware complication. There is a comminuted distal fibular fracture with improved alignment. There is chondrocalcinosis in the weight-bearing compartments of the left knee. The ankle joint spaces are maintained. There is soft tissue swelling. Hardware fixation of the distal tibial fracture without complication. Comminuted fracture of the distal fibula with improved alignment. Soft tissue swelling. XR TIBIA FIBULA RIGHT (2 VIEWS)    Result Date: 2/3/2022  EXAMINATION: TWO XRAY VIEWS OF THE LEFT TIBIA AND FIBULA; TWO XRAY VIEWS OF THE RIGHT TIBIA AND FIBULA; THREE XRAY VIEWS OF THE LEFT FOOT; THREE XRAY VIEWS OF THE RIGHT FOOT 1/30/2022 12:55 pm COMPARISON: Right and left CT tibia/fibula performed 01/30/2022. HISTORY: ORDERING SYSTEM PROVIDED HISTORY: trauma TECHNOLOGIST PROVIDED HISTORY: trauma FINDINGS: LEFT TIBIA/FIBULA: Acute displaced fracture of the distal tibial diaphysis which demonstrates improved alignment and angulation. There is a comminuted and displaced acute fracture of the distal fibular diaphysis with alignment grossly unchanged. Nondisplaced fracture of the tip of the lateral malleolus is better evaluated on prior CT. Diffuse osteopenia. Cast material is in place. LEFT FOOT: Cast material is in place which partially obscures evaluation of the soft tissues and osseous structures. Diffuse osteopenia. No definite acute displaced fracture demonstrated. RIGHT TIBIA/FIBULA/ANKLE: Right knee arthroplasty. Hardware is grossly intact. There is an acute displaced fracture of the distal tibial diaphysis. Alignment is grossly unchanged from prior examination.   There is an acute segmental fracture of the mid/distal fibular diaphysis, alignment grossly unchanged. There is screw and plate fixation of the distal tibia/fibula in the mediolateral malleoli. Hardware appears grossly intact. The talar dome appears grossly intact. Cast material is in place. RIGHT FOOT: Cast material is in place which partially obscures evaluation of the soft tissues and osseous structures. Diffuse osteopenia. No definite acute displaced fracture demonstrated. 1. Acute displaced fracture of the left distal tibial diaphysis with improved alignment. 2. Comminuted and displaced fracture of the left distal fibular diaphysis, alignment grossly unchanged. 3. Nondisplaced fracture of the tip of the left lateral malleolus, better evaluated on prior CT. 4. Cast material is in place on the left. 5. Acute displaced fracture of the right distal tibial diaphysis, alignment grossly unchanged. 6. Acute segmental fracture of the mid/distal right fibula, alignment grossly unchanged. 7. Right knee arthroplasty with intact hardware. 8. Cast material in place on the right. XR TIBIA FIBULA RIGHT (2 VIEWS)    Result Date: 1/31/2022  EXAMINATION: 4 XRAY VIEWS OF THE RIGHT TIBIA AND FIBULA 1/31/2022 7:27 pm COMPARISON: Right tib fib radiographs performed 01/30/2022. HISTORY: ORDERING SYSTEM PROVIDED HISTORY: S/p hindfoot IMN right tibia TECHNOLOGIST PROVIDED HISTORY: AP and lateral imaging right tibia, patient heading to TICU post-op. Thanks. S/p hindfoot IMN right tibia FINDINGS: There is hardware fixation of the distal tibial fracture with improved alignment. There is no hardware complication. There is a comminuted distal fibular fracture with improved alignment. There is hardware fixation of remote distal fibular fracture. Knee and ankle joint spaces are maintained. There is a knee arthroplasty without complication. There is soft tissue swelling. Hardware fixation of the distal tibial fracture with intramedullary cielo and no evidence for complication.  Comminuted fracture of the distal fibula diaphysis with improved alignment. Soft tissue swelling. XR ANKLE LEFT (2 VIEWS)    Result Date: 1/30/2022  EXAMINATION: 1 XRAY VIEWS OF THE LEFT ANKLE; 1 XRAY VIEWS OF THE RIGHT ANKLE 1/30/2022 12:01 pm COMPARISON: No priors HISTORY: ORDERING SYSTEM PROVIDED HISTORY: trauma TECHNOLOGIST PROVIDED HISTORY: trauma     Single lateral view of left ankle: Imaging from proximal tibial meta diaphysis through the ankle. Transversely oriented fracture distal tibia and fibular metadiaphysis at roughly the same level about 7 cm proximal to tibiotalar joint. Anterior displacement of distal fibular fragment by almost after shaft width. Lesser degree of displacement of distal tibial fragment. Ankle joint appears intact on this single lateral view. Soft tissue swelling lower leg/ankle. Single lateral view right ankle: Imaging from proximal tibial metadiaphysis through the ankle. There is distal tibial fracture close to the metaphysis about 7 cm above the tibiotalar joint. Distal fragment displaced anteriorly by almost half a full shaft width. There is comminuted fibular fracture at a slightly cephalad level with mild displacement. Plate and screws in the fibula. 2 tibial screws. RECOMMENDATION: 1. Distal left tibial and fibular metadiaphyseal fractures at about the same level. There is anterior displacement of distal fragments as discussed above. 2. Displaced distal tibia and fibula fractures. Tibial fracture at a lower level than fibula fracture. Distal tibial fragment displaced almost full shaft with anteriorly. Fibula fracture comminuted. Plate and screws in the distal tibia fibular as discussed above.      XR ANKLE RIGHT (2 VIEWS)    Result Date: 1/30/2022  EXAMINATION: 1 XRAY VIEWS OF THE LEFT ANKLE; 1 XRAY VIEWS OF THE RIGHT ANKLE 1/30/2022 12:01 pm COMPARISON: No priors HISTORY: ORDERING SYSTEM PROVIDED HISTORY: trauma TECHNOLOGIST PROVIDED HISTORY: trauma     Single lateral view of left ankle: Imaging from proximal tibial meta diaphysis through the ankle. Transversely oriented fracture distal tibia and fibular metadiaphysis at roughly the same level about 7 cm proximal to tibiotalar joint. Anterior displacement of distal fibular fragment by almost after shaft width. Lesser degree of displacement of distal tibial fragment. Ankle joint appears intact on this single lateral view. Soft tissue swelling lower leg/ankle. Single lateral view right ankle: Imaging from proximal tibial metadiaphysis through the ankle. There is distal tibial fracture close to the metaphysis about 7 cm above the tibiotalar joint. Distal fragment displaced anteriorly by almost half a full shaft width. There is comminuted fibular fracture at a slightly cephalad level with mild displacement. Plate and screws in the fibula. 2 tibial screws. RECOMMENDATION: 1. Distal left tibial and fibular metadiaphyseal fractures at about the same level. There is anterior displacement of distal fragments as discussed above. 2. Displaced distal tibia and fibula fractures. Tibial fracture at a lower level than fibula fracture. Distal tibial fragment displaced almost full shaft with anteriorly. Fibula fracture comminuted. Plate and screws in the distal tibia fibular as discussed above. XR ANKLE LEFT (MIN 3 VIEWS)    Result Date: 1/31/2022  EXAMINATION: THREE XRAY VIEWS OF THE LEFT ANKLE 1/31/2022 7:27 pm COMPARISON: Left ankle radiographs performed 01/30/2022. HISTORY: ORDERING SYSTEM PROVIDED HISTORY: S/p IMN left tibia TECHNOLOGIST PROVIDED HISTORY: AP, mortise and lateral images please. Patient heading to TICU post-op. Thanks. S/p IMN left tibia FINDINGS: There is hardware fixation of the distal tibial fracture without complication. There is a comminuted distal fibular fracture with improved alignment. The joint spaces are maintained. There is soft tissue swelling.      Hardware fixation of the distal tibial fracture without complication. Comminuted distal fibular fracture with improved alignment. Soft tissue swelling. XR ANKLE LEFT (MIN 3 VIEWS)    Result Date: 1/30/2022  EXAMINATION: THREE XRAY VIEWS OF THE LEFT ANKLE 1/30/2022 12:55 pm COMPARISON: 01/30/2022 at 11:50 hours HISTORY: ORDERING SYSTEM PROVIDED HISTORY: Post-Splint TECHNOLOGIST PROVIDED HISTORY: Post-Splint FINDINGS: Interval application of a back slab cast to the lower leg and foot. Redemonstration of displaced distal tibia and fibula metadiaphyseal fractures. Fibular fractures are slightly overriding in the AP view but appears similar to prior on lateral view. Tibial distal fracture fragment displaced medially by half a shaft width. There is now posterior displacement of the fragment whereas previously there was anterior displacement of the distal fragment. Interval placement of back slab cast.  Fractures again identified of the tibia and fibula as discussed above. There is now posterior displacement of distal tibial fragment by almost half a shaft with whereas previously it was anteriorly displaced. XR ANKLE RIGHT (MIN 3 VIEWS)    Result Date: 2/3/2022  EXAMINATION: TWO XRAY VIEWS OF THE LEFT TIBIA AND FIBULA; TWO XRAY VIEWS OF THE RIGHT TIBIA AND FIBULA; THREE XRAY VIEWS OF THE LEFT FOOT; THREE XRAY VIEWS OF THE RIGHT FOOT 1/30/2022 12:55 pm COMPARISON: Right and left CT tibia/fibula performed 01/30/2022. HISTORY: ORDERING SYSTEM PROVIDED HISTORY: trauma TECHNOLOGIST PROVIDED HISTORY: trauma FINDINGS: LEFT TIBIA/FIBULA: Acute displaced fracture of the distal tibial diaphysis which demonstrates improved alignment and angulation. There is a comminuted and displaced acute fracture of the distal fibular diaphysis with alignment grossly unchanged. Nondisplaced fracture of the tip of the lateral malleolus is better evaluated on prior CT. Diffuse osteopenia. Cast material is in place.  LEFT FOOT: Cast material is in place which partially obscures evaluation of the soft tissues and osseous structures. Diffuse osteopenia. No definite acute displaced fracture demonstrated. RIGHT TIBIA/FIBULA/ANKLE: Right knee arthroplasty. Hardware is grossly intact. There is an acute displaced fracture of the distal tibial diaphysis. Alignment is grossly unchanged from prior examination. There is an acute segmental fracture of the mid/distal fibular diaphysis, alignment grossly unchanged. There is screw and plate fixation of the distal tibia/fibula in the mediolateral malleoli. Hardware appears grossly intact. The talar dome appears grossly intact. Cast material is in place. RIGHT FOOT: Cast material is in place which partially obscures evaluation of the soft tissues and osseous structures. Diffuse osteopenia. No definite acute displaced fracture demonstrated. 1. Acute displaced fracture of the left distal tibial diaphysis with improved alignment. 2. Comminuted and displaced fracture of the left distal fibular diaphysis, alignment grossly unchanged. 3. Nondisplaced fracture of the tip of the left lateral malleolus, better evaluated on prior CT. 4. Cast material is in place on the left. 5. Acute displaced fracture of the right distal tibial diaphysis, alignment grossly unchanged. 6. Acute segmental fracture of the mid/distal right fibula, alignment grossly unchanged. 7. Right knee arthroplasty with intact hardware. 8. Cast material in place on the right. XR ANKLE RIGHT (MIN 3 VIEWS)    Result Date: 1/31/2022  EXAMINATION: THREE XRAY VIEWS OF THE RIGHT ANKLE 1/31/2022 7:27 pm COMPARISON: Right ankle radiographs performed 01/30/2022. HISTORY: ORDERING SYSTEM PROVIDED HISTORY: S/p hindfoot IMN right tibia TECHNOLOGIST PROVIDED HISTORY: AP, mortise and lateral imaging right ankle. Patient heading to TICU post-op. Thanks. S/p hindfoot IMN right tibia FINDINGS: There is hardware fixation of the distal tibial fracture with intramedullary cielo.   There is satisfactory alignment. There is hardware fixation of remote distal fibular fracture. There is a comminuted fracture involving the distal fibular diaphysis with improved alignment. There is soft tissue swelling. Hardware fixation of the distal tibial fracture without hardware complication. Comminuted fracture of the distal fibular diaphysis with improved alignment. Soft tissue swelling. XR FOOT LEFT (MIN 3 VIEWS)    Result Date: 2/3/2022  EXAMINATION: TWO XRAY VIEWS OF THE LEFT TIBIA AND FIBULA; TWO XRAY VIEWS OF THE RIGHT TIBIA AND FIBULA; THREE XRAY VIEWS OF THE LEFT FOOT; THREE XRAY VIEWS OF THE RIGHT FOOT 1/30/2022 12:55 pm COMPARISON: Right and left CT tibia/fibula performed 01/30/2022. HISTORY: ORDERING SYSTEM PROVIDED HISTORY: trauma TECHNOLOGIST PROVIDED HISTORY: trauma FINDINGS: LEFT TIBIA/FIBULA: Acute displaced fracture of the distal tibial diaphysis which demonstrates improved alignment and angulation. There is a comminuted and displaced acute fracture of the distal fibular diaphysis with alignment grossly unchanged. Nondisplaced fracture of the tip of the lateral malleolus is better evaluated on prior CT. Diffuse osteopenia. Cast material is in place. LEFT FOOT: Cast material is in place which partially obscures evaluation of the soft tissues and osseous structures. Diffuse osteopenia. No definite acute displaced fracture demonstrated. RIGHT TIBIA/FIBULA/ANKLE: Right knee arthroplasty. Hardware is grossly intact. There is an acute displaced fracture of the distal tibial diaphysis. Alignment is grossly unchanged from prior examination. There is an acute segmental fracture of the mid/distal fibular diaphysis, alignment grossly unchanged. There is screw and plate fixation of the distal tibia/fibula in the mediolateral malleoli. Hardware appears grossly intact. The talar dome appears grossly intact. Cast material is in place.  RIGHT FOOT: Cast material is in place which partially obscures evaluation of the soft tissues and osseous structures. Diffuse osteopenia. No definite acute displaced fracture demonstrated. 1. Acute displaced fracture of the left distal tibial diaphysis with improved alignment. 2. Comminuted and displaced fracture of the left distal fibular diaphysis, alignment grossly unchanged. 3. Nondisplaced fracture of the tip of the left lateral malleolus, better evaluated on prior CT. 4. Cast material is in place on the left. 5. Acute displaced fracture of the right distal tibial diaphysis, alignment grossly unchanged. 6. Acute segmental fracture of the mid/distal right fibula, alignment grossly unchanged. 7. Right knee arthroplasty with intact hardware. 8. Cast material in place on the right. XR FOOT RIGHT (MIN 3 VIEWS)    Result Date: 2/3/2022  EXAMINATION: TWO XRAY VIEWS OF THE LEFT TIBIA AND FIBULA; TWO XRAY VIEWS OF THE RIGHT TIBIA AND FIBULA; THREE XRAY VIEWS OF THE LEFT FOOT; THREE XRAY VIEWS OF THE RIGHT FOOT 1/30/2022 12:55 pm COMPARISON: Right and left CT tibia/fibula performed 01/30/2022. HISTORY: ORDERING SYSTEM PROVIDED HISTORY: trauma TECHNOLOGIST PROVIDED HISTORY: trauma FINDINGS: LEFT TIBIA/FIBULA: Acute displaced fracture of the distal tibial diaphysis which demonstrates improved alignment and angulation. There is a comminuted and displaced acute fracture of the distal fibular diaphysis with alignment grossly unchanged. Nondisplaced fracture of the tip of the lateral malleolus is better evaluated on prior CT. Diffuse osteopenia. Cast material is in place. LEFT FOOT: Cast material is in place which partially obscures evaluation of the soft tissues and osseous structures. Diffuse osteopenia. No definite acute displaced fracture demonstrated. RIGHT TIBIA/FIBULA/ANKLE: Right knee arthroplasty. Hardware is grossly intact. There is an acute displaced fracture of the distal tibial diaphysis. Alignment is grossly unchanged from prior examination.   There is an acute segmental fracture of the mid/distal fibular diaphysis, alignment grossly unchanged. There is screw and plate fixation of the distal tibia/fibula in the mediolateral malleoli. Hardware appears grossly intact. The talar dome appears grossly intact. Cast material is in place. RIGHT FOOT: Cast material is in place which partially obscures evaluation of the soft tissues and osseous structures. Diffuse osteopenia. No definite acute displaced fracture demonstrated. 1. Acute displaced fracture of the left distal tibial diaphysis with improved alignment. 2. Comminuted and displaced fracture of the left distal fibular diaphysis, alignment grossly unchanged. 3. Nondisplaced fracture of the tip of the left lateral malleolus, better evaluated on prior CT. 4. Cast material is in place on the left. 5. Acute displaced fracture of the right distal tibial diaphysis, alignment grossly unchanged. 6. Acute segmental fracture of the mid/distal right fibula, alignment grossly unchanged. 7. Right knee arthroplasty with intact hardware. 8. Cast material in place on the right. CT HEAD WO CONTRAST    Result Date: 1/30/2022  EXAMINATION: CT OF THE CERVICAL SPINE WITHOUT CONTRAST; CT OF THE HEAD WITHOUT CONTRAST 1/30/2022 8:42 am; 1/30/2022 9:20 am TECHNIQUE: CT of the cervical spine was performed without the administration of intravenous contrast. Multiplanar reformatted images are provided for review. Dose modulation, iterative reconstruction, and/or weight based adjustment of the mA/kV was utilized to reduce the radiation dose to as low as reasonably achievable.; CT of the head was performed without the administration of intravenous contrast. Dose modulation, iterative reconstruction, and/or weight based adjustment of the mA/kV was utilized to reduce the radiation dose to as low as reasonably achievable. COMPARISON: None.  HISTORY: ORDERING SYSTEM PROVIDED HISTORY: Trauma TECHNOLOGIST PROVIDED HISTORY: Trauma Reason for Exam: trauma; ORDERING SYSTEM PROVIDED HISTORY: Trauma TECHNOLOGIST PROVIDED HISTORY: Trauma Reason for Exam: trauma CT BRAIN FINDINGS: BRAIN/VENTRICLES: The cerebral hemispheres, brainstem, and cerebellum have a normal appearance . The falx is midline. The ventricles and peripheral sulci are normal.  There is no sign of a space occupying lesion, infarction, or hemorrhage. Orbits: Portion of the orbits demonstrate no acute abnormality. SINUSES: . The  imaged portions of the paranasal sinuses are clear. The mastoids and the middle ear chambers are clear. SOFT TISSUES/SKULL:  No acute abnormality of the visualized skull or soft tissues. Vascular calcifications are seen compatible with atherosclerotic disease. CT CERVICAL SPINE FINDINGS: The cervical spine demonstrates decreasedmineralization with normal cervical lordosis. Fracture involving the spinous process of C6, possibly old. There is no evidence of subluxation. There is loss of disc height with eburnation of the vertebral endplates at the T4-8, I4-7, C5-6, C6-7 levels. There are anterior and posterior marginal osteophytes at multiple levels. Mild to moderate spinal stenosis at C5-6 and C6-7. Lakeisha Maid There is bilateral facet hypertrophy at multiple levels throughout the cervical spine. The pedicles and posterior elements are otherwise intact. The prevertebral and paravertebral soft tissues are unremarkable. The atlanto-dens interval and dens are intact. The visualized lung apices are clear. Vascular calcifications are seen compatible with atherosclerotic disease. No acute intracranial abnormalities are noted. Multilevel cervical spondylosis and degenerative disc disease. Mild to moderate spinal stenosis at C5-6 and C6-7 Probable chronic fracture of C6 spinous process. Please correlate clinically RECOMMENDATIONS: Further evaluation of the cervical spine should be obtained with MR imaging if clinically indicated.      CT CERVICAL SPINE WO CONTRAST    Result Date: 1/30/2022  EXAMINATION: CT OF THE CERVICAL SPINE WITHOUT CONTRAST; CT OF THE HEAD WITHOUT CONTRAST 1/30/2022 8:42 am; 1/30/2022 9:20 am TECHNIQUE: CT of the cervical spine was performed without the administration of intravenous contrast. Multiplanar reformatted images are provided for review. Dose modulation, iterative reconstruction, and/or weight based adjustment of the mA/kV was utilized to reduce the radiation dose to as low as reasonably achievable.; CT of the head was performed without the administration of intravenous contrast. Dose modulation, iterative reconstruction, and/or weight based adjustment of the mA/kV was utilized to reduce the radiation dose to as low as reasonably achievable. COMPARISON: None. HISTORY: ORDERING SYSTEM PROVIDED HISTORY: Trauma TECHNOLOGIST PROVIDED HISTORY: Trauma Reason for Exam: trauma; ORDERING SYSTEM PROVIDED HISTORY: Trauma TECHNOLOGIST PROVIDED HISTORY: Trauma Reason for Exam: trauma CT BRAIN FINDINGS: BRAIN/VENTRICLES: The cerebral hemispheres, brainstem, and cerebellum have a normal appearance . The falx is midline. The ventricles and peripheral sulci are normal.  There is no sign of a space occupying lesion, infarction, or hemorrhage. Orbits: Portion of the orbits demonstrate no acute abnormality. SINUSES: . The  imaged portions of the paranasal sinuses are clear. The mastoids and the middle ear chambers are clear. SOFT TISSUES/SKULL:  No acute abnormality of the visualized skull or soft tissues. Vascular calcifications are seen compatible with atherosclerotic disease. CT CERVICAL SPINE FINDINGS: The cervical spine demonstrates decreasedmineralization with normal cervical lordosis. Fracture involving the spinous process of C6, possibly old. There is no evidence of subluxation. There is loss of disc height with eburnation of the vertebral endplates at the N5-1, S9-3, C5-6, C6-7 levels. There are anterior and posterior marginal osteophytes at multiple levels. Mild to moderate spinal stenosis at C5-6 and C6-7. Agnes Brittle There is bilateral facet hypertrophy at multiple levels throughout the cervical spine. The pedicles and posterior elements are otherwise intact. The prevertebral and paravertebral soft tissues are unremarkable. The atlanto-dens interval and dens are intact. The visualized lung apices are clear. Vascular calcifications are seen compatible with atherosclerotic disease. No acute intracranial abnormalities are noted. Multilevel cervical spondylosis and degenerative disc disease. Mild to moderate spinal stenosis at C5-6 and C6-7 Probable chronic fracture of C6 spinous process. Please correlate clinically RECOMMENDATIONS: Further evaluation of the cervical spine should be obtained with MR imaging if clinically indicated. XR CHEST PORTABLE    Result Date: 1/31/2022  EXAMINATION: ONE XRAY VIEW OF THE CHEST 1/31/2022 6:27 pm COMPARISON: Chest CT study from January 30, 2022. HISTORY: ORDERING SYSTEM PROVIDED HISTORY: Remained intubated after procedure TECHNOLOGIST PROVIDED HISTORY: Remained intubated after procedure FINDINGS: A portable upright frontal view chest radiograph was obtained. The heart is enlarged. Minimal atelectasis is present the left lung base and there is a small left pleural effusion. The right lung is grossly clear. The pulmonary vascular pattern is within normal limits. No pneumothorax or significant right pleural effusion. Moderate-sized hiatal hernia which accounts for the retrocardiac opacity, seen to better extent on the chest CT study from January 30, 2022. Left transvenous pacemaker in place. Endotracheal tube, nasogastric tube, and esophageal pH probe have been placed in the interim. Degenerative changes are present throughout the thoracic spine. No acute fracture. Minimal left basilar atelectasis. Otherwise, clear lungs. Cardiomegaly. Small left pleural effusion. No pneumothorax.  The endotracheal tube is positioned appropriately above the elizabeth and below the clavicular heads. The esophageal pH probe is likely positioned within the [id number] esophagus just proximal to the hiatal hernia and the distal tip and proximal side hole of the nasogastric tube are positioned within the lower left chest, at or just above the level of the left hemidiaphragm and likely within the hiatal hernia. CT TIBIA FIBULA LEFT WO CONTRAST    Result Date: 1/30/2022  EXAMINATION: CT OF THE RIGHT TIBIA AND FIBULA WITHOUT CONTRAST; CT OF THE LEFT TIBIA AND FIBULA WITHOUT CONTRAST 1/30/2022 1:57 pm; 1/30/2022 1:13 pm TECHNIQUE: CT of the right tibia and fibula was performed without the administration of intravenous contrast.  Multiplanar reformatted images are provided for review. Dose modulation, iterative reconstruction, and/or weight based adjustment of the mA/kV was utilized to reduce the radiation dose to as low as reasonably achievable.; CT of the left tibia and fibula was performed without the administration of intravenous contrast.  Multiplanar reformatted images are provided for review. Dose modulation, iterative reconstruction, and/or weight based adjustment of the mA/kV was utilized to reduce the radiation dose to as low as reasonably achievable. COMPARISON: Right and left tibia and fibula radiographs same day HISTORY ORDERING SYSTEM PROVIDED HISTORY: trauma TECHNOLOGIST PROVIDED HISTORY: trauma Decision Support Exception - unselect if not a suspected or confirmed emergency medical condition->Emergency Medical Condition (MA) FINDINGS: CT RIGHT TIBIA AND FIBULA: Bones: Postoperative changes of right total knee arthroplasty. Prior ORIF of the lateral malleolus with a lateral plate and screw construct and syndesmotic screws in place. The hardware appears intact. Prior ORIF of the medial malleolus with 2 screws traversing the medial malleolus.   Acute and displaced fracture of the distal diaphysis of the right tibia with distal fracture edema and gas consistent with open fractures. 2. Nondisplaced fracture of the tip of the left lateral malleolus. 3. Osteopenia. CT TIBIA FIBULA RIGHT WO CONTRAST    Result Date: 1/30/2022  EXAMINATION: CT OF THE RIGHT TIBIA AND FIBULA WITHOUT CONTRAST; CT OF THE LEFT TIBIA AND FIBULA WITHOUT CONTRAST 1/30/2022 1:57 pm; 1/30/2022 1:13 pm TECHNIQUE: CT of the right tibia and fibula was performed without the administration of intravenous contrast.  Multiplanar reformatted images are provided for review. Dose modulation, iterative reconstruction, and/or weight based adjustment of the mA/kV was utilized to reduce the radiation dose to as low as reasonably achievable.; CT of the left tibia and fibula was performed without the administration of intravenous contrast.  Multiplanar reformatted images are provided for review. Dose modulation, iterative reconstruction, and/or weight based adjustment of the mA/kV was utilized to reduce the radiation dose to as low as reasonably achievable. COMPARISON: Right and left tibia and fibula radiographs same day HISTORY ORDERING SYSTEM PROVIDED HISTORY: trauma TECHNOLOGIST PROVIDED HISTORY: trauma Decision Support Exception - unselect if not a suspected or confirmed emergency medical condition->Emergency Medical Condition (MA) FINDINGS: CT RIGHT TIBIA AND FIBULA: Bones: Postoperative changes of right total knee arthroplasty. Prior ORIF of the lateral malleolus with a lateral plate and screw construct and syndesmotic screws in place. The hardware appears intact. Prior ORIF of the medial malleolus with 2 screws traversing the medial malleolus. Acute and displaced fracture of the distal diaphysis of the right tibia with distal fracture fragment displaced laterally by up to 1.6 cm (image 61 series 310). Acute, segmental fracture of the mid diaphysis of the fibula with major fracture fragments displaced by up to approximately 1.7 cm (images 65 through 76 series 310).  Soft Tissue: Soft tissue edema and subcutaneous gas with laceration at the anterior aspect of the distal right tibia. Findings consistent with open fracture. No radiopaque foreign body identified. Atherosclerotic disease noted. Joint: Right total knee arthroplasty. The hardware appears intact without evidence for loosening or failure. Anatomic alignment of the knee and ankle with no dislocation identified. CT LEFT TIBIA AND FIBULA: Bones: Bones are osteopenic. Acute and displaced fracture of the distal diaphysis of the left tibia. The distal fracture fragment is displaced laterally by up to 1.4 cm (image 52 series 314). Acute, comminuted, and displaced fracture of the distal diaphysis of the lateral malleolus with distal fracture fragment displaced by up to 1.2 cm (image 64 series 314). Acute nondisplaced fracture of the distal tip of the lateral malleolus on the left (images 216 through 218 series 315). Soft Tissue: Soft tissue edema and subcutaneous gas about the left tibia and fibula fracture sites with soft tissue defects seen anteriorly at the level of the distal tibia. Findings consistent with open fracture. No radiopaque foreign body identified. Atherosclerotic disease noted. Joint: Anatomic alignment of the left knee and left ankle with no dislocation identified. CT right tibia and fibula: 1. Acute and displaced fracture of the distal diaphysis of the right tibia. 2. Acute segmental fracture of the mid diaphysis of the right fibula. 3. Tissue edema, subcutaneous gas, and anterior laceration consistent with open fractures. CT left tibia and fibula: 1. Acute and displaced fractures of the distal diaphysis of the left tibia and fibula with surrounding soft tissue edema and gas consistent with open fractures. 2. Nondisplaced fracture of the tip of the left lateral malleolus. 3. Osteopenia. FLUORO FOR SURGICAL PROCEDURES    Result Date: 1/31/2022  Radiology exam is complete. No Radiologist dictation.  Please follow up with ordering provider. FLUORO FOR SURGICAL PROCEDURES    Result Date: 1/31/2022  Radiology exam is complete. No Radiologist dictation. Please follow up with ordering provider. CT CHEST ABDOMEN PELVIS WO CONTRAST    Result Date: 1/30/2022  EXAMINATION: CT OF THE CHEST, ABDOMEN, AND PELVIS WITHOUT CONTRAST; CT OF THE THORACIC SPINE WITHOUT CONTRAST; CT OF THE LUMBAR SPINE WITHOUT CONTRAST 1/30/2022 11:43 am TECHNIQUE: CT of the chest, abdomen and pelvis was performed without the administration of intravenous contrast. Multiplanar reformatted images are provided for review. Dose modulation, iterative reconstruction, and/or weight based adjustment of the mA/kV was utilized to reduce the radiation dose to as low as reasonably achievable.; CT of the thoracic spine was performed without the administration of intravenous contrast. Multiplanar reformatted images are provided for review. Dose modulation, iterative reconstruction, and/or weight based adjustment of the mA/kV was utilized to reduce the radiation dose to as low as reasonably achievable.; CT of the lumbar spine was performed without the administration of intravenous contrast. Multiplanar reformatted images are provided for review. Adjustment of mA and/or kV according to patient size was utilized. Dose modulation, iterative reconstruction, and/or weight based adjustment of the mA/kV was utilized to reduce the radiation dose to as low as reasonably achievable. COMPARISON: None HISTORY: ORDERING SYSTEM PROVIDED HISTORY: Trauma TECHNOLOGIST PROVIDED HISTORY: Trauma Reason for Exam: trauma FINDINGS: Chest: Mediastinum: Cardiomegaly. Coronary artery calcifications. Pacemaker wires noted. No significant lymphadenopathy. Large sliding hiatal hernia. Lungs/pleura: Respiratory motion artifacts limit evaluation. This subsegmental atelectasis/scarring at the lung bases. No pneumothorax. No pleural effusion. Calcified left upper lobe nodule.   No pneumonia. Soft Tissues/Bones: No acute abnormality of the bones. The superficial soft tissues show no significant abnormalities. Abdomen/Pelvis: Organs: Cholelithiasis. Hepatic and splenic granulomatous calcifications. Pancreas, adrenal glands and kidneys show no significant abnormalities. Probable tiny calculus upper pole right kidney. GI/Bowel: There is limited evaluation due to absence of oral contrast.  Large sliding hiatal hernia otherwise stomach unremarkable. No evidence for bowel obstruction. Appendix normal.  Sigmoid diverticulosis. Pelvis: Hysterectomy. Urinary bladder is unremarkable. Peritoneum/Retroperitoneum: No free fluid. No lymphadenopathy. Atherosclerotic disease. Bones/Soft Tissues: Degenerative changes. L2 through L5 laminectomy. No acute abnormality of the bones. The superficial soft tissues show no significant abnormalities. THORACIC/LUMBAR SPINE: BONES/ALIGNMENT: There is no evidence of an acute thoracic or lumbar spine fracture. There is old right L1 and L2 transverse process fracture. There is normal alignment of the thoracic and lumbar spine. L2-5 laminectomy. DEGENERATIVE CHANGES: Moderate degenerative changes. SOFT TISSUES: There is no prevertebral soft tissue swelling. CHEST ABDOMEN PELVIS 1. No acute visceral injury. 2. Cholelithiasis. 3. Large sliding hiatal hernia. THORACIC AND LUMBAR SPINE: 1. No acute fracture. 2. Moderate degenerative changes. 3. Multilevel lumbar laminectomies. CT LUMBAR SPINE TRAUMA RECONSTRUCTION    Result Date: 1/30/2022  EXAMINATION: CT OF THE CHEST, ABDOMEN, AND PELVIS WITHOUT CONTRAST; CT OF THE THORACIC SPINE WITHOUT CONTRAST; CT OF THE LUMBAR SPINE WITHOUT CONTRAST 1/30/2022 11:43 am TECHNIQUE: CT of the chest, abdomen and pelvis was performed without the administration of intravenous contrast. Multiplanar reformatted images are provided for review.  Dose modulation, iterative reconstruction, and/or weight based adjustment of the mA/kV was utilized to reduce the radiation dose to as low as reasonably achievable.; CT of the thoracic spine was performed without the administration of intravenous contrast. Multiplanar reformatted images are provided for review. Dose modulation, iterative reconstruction, and/or weight based adjustment of the mA/kV was utilized to reduce the radiation dose to as low as reasonably achievable.; CT of the lumbar spine was performed without the administration of intravenous contrast. Multiplanar reformatted images are provided for review. Adjustment of mA and/or kV according to patient size was utilized. Dose modulation, iterative reconstruction, and/or weight based adjustment of the mA/kV was utilized to reduce the radiation dose to as low as reasonably achievable. COMPARISON: None HISTORY: ORDERING SYSTEM PROVIDED HISTORY: Trauma TECHNOLOGIST PROVIDED HISTORY: Trauma Reason for Exam: trauma FINDINGS: Chest: Mediastinum: Cardiomegaly. Coronary artery calcifications. Pacemaker wires noted. No significant lymphadenopathy. Large sliding hiatal hernia. Lungs/pleura: Respiratory motion artifacts limit evaluation. This subsegmental atelectasis/scarring at the lung bases. No pneumothorax. No pleural effusion. Calcified left upper lobe nodule. No pneumonia. Soft Tissues/Bones: No acute abnormality of the bones. The superficial soft tissues show no significant abnormalities. Abdomen/Pelvis: Organs: Cholelithiasis. Hepatic and splenic granulomatous calcifications. Pancreas, adrenal glands and kidneys show no significant abnormalities. Probable tiny calculus upper pole right kidney. GI/Bowel: There is limited evaluation due to absence of oral contrast.  Large sliding hiatal hernia otherwise stomach unremarkable. No evidence for bowel obstruction. Appendix normal.  Sigmoid diverticulosis. Pelvis: Hysterectomy. Urinary bladder is unremarkable. Peritoneum/Retroperitoneum: No free fluid. No lymphadenopathy. Atherosclerotic disease. Bones/Soft Tissues: Degenerative changes. L2 through L5 laminectomy. No acute abnormality of the bones. The superficial soft tissues show no significant abnormalities. THORACIC/LUMBAR SPINE: BONES/ALIGNMENT: There is no evidence of an acute thoracic or lumbar spine fracture. There is old right L1 and L2 transverse process fracture. There is normal alignment of the thoracic and lumbar spine. L2-5 laminectomy. DEGENERATIVE CHANGES: Moderate degenerative changes. SOFT TISSUES: There is no prevertebral soft tissue swelling. CHEST ABDOMEN PELVIS 1. No acute visceral injury. 2. Cholelithiasis. 3. Large sliding hiatal hernia. THORACIC AND LUMBAR SPINE: 1. No acute fracture. 2. Moderate degenerative changes. 3. Multilevel lumbar laminectomies. CT THORACIC SPINE TRAUMA RECONSTRUCTION    Result Date: 1/30/2022  EXAMINATION: CT OF THE CHEST, ABDOMEN, AND PELVIS WITHOUT CONTRAST; CT OF THE THORACIC SPINE WITHOUT CONTRAST; CT OF THE LUMBAR SPINE WITHOUT CONTRAST 1/30/2022 11:43 am TECHNIQUE: CT of the chest, abdomen and pelvis was performed without the administration of intravenous contrast. Multiplanar reformatted images are provided for review. Dose modulation, iterative reconstruction, and/or weight based adjustment of the mA/kV was utilized to reduce the radiation dose to as low as reasonably achievable.; CT of the thoracic spine was performed without the administration of intravenous contrast. Multiplanar reformatted images are provided for review. Dose modulation, iterative reconstruction, and/or weight based adjustment of the mA/kV was utilized to reduce the radiation dose to as low as reasonably achievable.; CT of the lumbar spine was performed without the administration of intravenous contrast. Multiplanar reformatted images are provided for review. Adjustment of mA and/or kV according to patient size was utilized.   Dose modulation, iterative reconstruction, and/or weight based adjustment of the mA/kV was utilized to reduce the radiation dose to as low as reasonably achievable. COMPARISON: None HISTORY: ORDERING SYSTEM PROVIDED HISTORY: Trauma TECHNOLOGIST PROVIDED HISTORY: Trauma Reason for Exam: trauma FINDINGS: Chest: Mediastinum: Cardiomegaly. Coronary artery calcifications. Pacemaker wires noted. No significant lymphadenopathy. Large sliding hiatal hernia. Lungs/pleura: Respiratory motion artifacts limit evaluation. This subsegmental atelectasis/scarring at the lung bases. No pneumothorax. No pleural effusion. Calcified left upper lobe nodule. No pneumonia. Soft Tissues/Bones: No acute abnormality of the bones. The superficial soft tissues show no significant abnormalities. Abdomen/Pelvis: Organs: Cholelithiasis. Hepatic and splenic granulomatous calcifications. Pancreas, adrenal glands and kidneys show no significant abnormalities. Probable tiny calculus upper pole right kidney. GI/Bowel: There is limited evaluation due to absence of oral contrast.  Large sliding hiatal hernia otherwise stomach unremarkable. No evidence for bowel obstruction. Appendix normal.  Sigmoid diverticulosis. Pelvis: Hysterectomy. Urinary bladder is unremarkable. Peritoneum/Retroperitoneum: No free fluid. No lymphadenopathy. Atherosclerotic disease. Bones/Soft Tissues: Degenerative changes. L2 through L5 laminectomy. No acute abnormality of the bones. The superficial soft tissues show no significant abnormalities. THORACIC/LUMBAR SPINE: BONES/ALIGNMENT: There is no evidence of an acute thoracic or lumbar spine fracture. There is old right L1 and L2 transverse process fracture. There is normal alignment of the thoracic and lumbar spine. L2-5 laminectomy. DEGENERATIVE CHANGES: Moderate degenerative changes. SOFT TISSUES: There is no prevertebral soft tissue swelling. CHEST ABDOMEN PELVIS 1. No acute visceral injury. 2. Cholelithiasis. 3. Large sliding hiatal hernia.  THORACIC AND LUMBAR SPINE: 1. No acute fracture. 2. Moderate degenerative changes. 3. Multilevel lumbar laminectomies. DISCHARGE INSTRUCTIONS     Discharge Medications:        Medication List      ASK your doctor about these medications    acetaminophen 500 MG tablet  Commonly known as: TYLENOL     allopurinol 100 MG tablet  Commonly known as: ZYLOPRIM     amLODIPine 5 MG tablet  Commonly known as: NORVASC     aspirin 81 MG chewable tablet     bumetanide 1 MG tablet  Commonly known as: BUMEX     Fish Oil 1200 MG Caps     insulin glargine 100 UNIT/ML injection vial  Commonly known as: LANTUS     insulin lispro 100 UNIT/ML injection vial  Commonly known as: HUMALOG     levothyroxine 25 MCG tablet  Commonly known as: SYNTHROID     magnesium oxide 400 MG tablet  Commonly known as: MAG-OX     omeprazole 20 MG delayed release capsule  Commonly known as: PRILOSEC     potassium chloride 10 MEQ extended release tablet  Commonly known as: KLOR-CON     pregabalin 100 MG capsule  Commonly known as: LYRICA     primidone 50 MG tablet  Commonly known as:  MYSOLINE     propranolol 20 MG tablet  Commonly known as: INDERAL     pyridoxine 100 MG tablet  Commonly known as: B-6     spironolactone 25 MG tablet  Commonly known as: ALDACTONE     Vitamin D3 125 MCG (5000 UT) Tabs     vitamin E 400 UNIT capsule          Time of death 12:18 PM on 2/6/2022       Time Spent for discharge: 29 minutes      Soledad Cortes DO

## 2022-02-06 NOTE — SIGNIFICANT EVENT
Patient noted to have absence of pulse/respirations at [phone], confirmed by Alanis Tejeda and charge RN. MD notified. Release of body form completed. HIPPA form for [facility] completed. [employer] called.  notified.  notified.

## 2022-02-06 NOTE — PROGRESS NOTES
PROGRESS NOTE    PATIENT NAME: [address] RECORD NO. [id number]  DATE: 2022  PRIMARY CARE PHYSICIAN: Fred Gordon,     HD: # 7  ASSESSMENT    Patient Active Problem List   Diagnosis    Fall at home, initial encounter    Type III open fracture of both tibias     MEDICAL DECISION MAKING AND PLAN  Open bl tib/fib fracture   -s/p repair with orthopedic surgery ()   -MMPT   -Home meds resumed   -Dysphagia diet     -Dispo planning to hospice, DNR CC    SUBJECTIVE    Karly He is seen and examined. No acute events overnight. On NC, tachycardic rate, irregular rhythm. Pt was changed to DNR CC yesterday. OBJECTIVE  VITALS: Temp: Temp: 100.7 °F (38.2 °C)Temp  Av.5 °F (37.5 °C)  Min: 97.4 °F (36.3 °C)  Max: 100.7 °F (99.9 °C) BP Systolic (50IEL), NHW:782 , Min:98 , CHC:514   Diastolic (01HEJ), CHQ:92, Min:54, Max:136   Pulse Pulse  Av.4  Min: 96  Max: 147 Resp Resp  Avg: 15.8  Min: 14  Max: 18 Pulse ox SpO2  Av.7 %  Min: 85 %  Max: 96 %  GENERAL: sleeping, no acute distress  NEURO: CNII-XII grossly intact, no focal neurological deficits    HEENT: atraumatic, normocephalic, sclera sclear, nose without deformity, trachea midline   LUNGS: inc WOB on NC  HEART: tachycardic rate and irregular rhythm   ABDOMEN: soft, nontender, nondistended  EXTREMITY: no cyanosis,  BLE dressings CDI    I/O last 3 completed shifts: In: 915 [NG/GT:872]  Out: 3650 [Urine:3650]    Drain/tube output: In: 872 [NG/GT:872]  Out: 2900 [Urine:2900]    LAB:  CBC:   Recent Labs     228 22  0734   WBC 9.1 11.0   HGB 8.4* 8.9*   HCT 26.2* 28.5*   MCV 99.2 99.7    180     BMP:   Recent Labs     228 22  0734    144   K 4.3 4.8    108*   CO2 30 30   BUN 35* 36*   CREATININE 1.12* 1.01*   GLUCOSE 182* 213*     COAGS: No results for input(s): APTT, PROT, INR in the last 72 hours.       Edna Sage, DO  General Surgery PGY-3         Attending Note      I have reviewed the above [facility] note(s) and I either performed the key elements of the medical history and physical exam or was present with the trauma resident when the key elements of the medical history and physical exam were performed. I have discussed the findings, established the care plan and recommendations with the trauma team.  Appears comfortable, decreasing sats. Hospice involved.     Sonja Arroyo MD  2/6/2022  2:29 PM

## 2022-02-06 NOTE — PROGRESS NOTES
DEATH NOTE    PATIENT NAME: Shara Jones  YOB: 1933  MEDICAL RECORD NO. [id number]  DATE: 2/6/2022  PRIMARY CARE PHYSICIAN: Craig Wilson DO    DIAGNOSIS OF DEATH     I have confirmed the death of this patient in accordance with accepted medical standards.   The patient is dead as evidenced by cardiac death or cessation of brain function:    Cardiac Death (check all that apply):     [x]  Absence of respiratory effort by observation     [x]  Absence of pulse by palpation     []  Absence of blood pressure by sphygmomanometry      [x]  Absence of sustainable cardiac rhythm by monitor    Death by Cessation of Brain Function (check all that apply):     []  Absence of Cerebral Function with no motor response     []  Absence of brain stem function by systemic physical exam     []  Failure to respond with respiratory drive by apnea test     Additional, optional, confirmatory tests     []  Cerebral Electrical silence as interpreted by qualified reader     []  Absence of brain blood flow by radiologic technique      CERTIFICATION OF DEATH     I have pronounced the patient dead on:     Date: 2/6/2022 at 12:18PM    NOTIFICATIONS     Attending physician (name) notified: Dr. Ashish Flores                                                   []  Per Nursing    Family notified: Name and/or Relationship: Family at bedside, daughters                                    []  Per Nursing     notified (Name):                                                [x]  Per Nursing      Soledad Cortes DO

## 2022-02-11 NOTE — PROGRESS NOTES
Physician Progress Note      PATIENT:               Yumiko Frazier  CSN #:                  [id number]  :                       1933  ADMIT DATE:       2022 11:29 AM  DISCH DATE:        2022 12:18 PM  RESPONDING  PROVIDER #:        Marta BONILLA APRN - CNP          QUERY TEXT:    Pt admitted with bilateral tib/fib fractures and has anemia documented. If   possible, please document in progress notes and discharge summary further   specificity regarding the acuity and type of anemia:    The medical record reflects the following:  Risk Factors: Surgery , bilateral tib/fib fractures, Age 80  Clinical Indicators: /68>99/59>80/56>79/68>75/51>140/107>77/35,MAP 45,   Resp 21>30>7>28>24>11>20, Jgnw450.0>100.1>100.7, Pulse 91>109>141>147, Hgb   12.2> 7.0, MAP 57-77, BNP 1700, ,Opens eyes to verbal, follows commands, per    trauma note- Overnight, the patient was having intermittent hypotension,   ultimately requiring levophed to maintain maps greater than 65. Currently in   A Fib w/ RVR  Treatment: Transfused w/ RBC on  ,, on pressors- phenylephrine Dose 100   mcg IV gtt , norepinephrine (LEVOPHED) 16 mg  infusion,, precedex IV,   lactated ringers [id number] mL/hr monitoring    Thank you, Please call if questions  Yolanda ASKEW Ortiz Comment YUE CDS  [phone]  Options provided:  -- Anemia due to acute blood loss  -- Anemia due to postoperative blood loss  -- Other - I will add my own diagnosis  -- Disagree - Not applicable / Not valid  -- Disagree - Clinically unable to determine / Unknown  -- Refer to Clinical Documentation Reviewer    PROVIDER RESPONSE TEXT:    This patient has acute blood loss anemia. Query created by: Meriel Frankel on 2022 10:06 AM      QUERY TEXT:    Pt admitted with bilat tib/fib fractures. Pt noted to have Cre.1.01>1.79 If   possible, please document in the progress notes and discharge summary if you   are evaluating and/or treating any of the following:     The CHF/HFpEF  -- Chronic Diastolic CHF/HFpEF  -- Other - I will add my own diagnosis  -- Disagree - Not applicable / Not valid  -- Disagree - Clinically unable to determine / Unknown  -- Refer to Clinical Documentation Reviewer    PROVIDER RESPONSE TEXT:    This patient has chronic diastolic CHF/HFpEF.     Query created by: Warren Luna on 2/10/2022 12:23 PM      Electronically signed by:  Mk Walsh CNP 2/11/2022 10:27 AM

## 2024-11-29 NOTE — PROGRESS NOTES
PROGRESS NOTE          PATIENT NAME: [address] RECORD NO. [id number]  DATE: 2022  SURGEON: Dr. Timur Zapata: Hailee Macario,     HD: # 1    ASSESSMENT    Patient Active Problem List   Diagnosis    Fall at home, initial encounter       MEDICAL DECISION MAKING AND PLAN    N: MMPT: apap, gabapentin, robaxin, christina  C: HDS. Home meds need reconciled. P: Encourage IS, deep breathing, and cough. FEN: Fluids: LR @ 125. Replete lytes PRN. GI: Diet: NPO for OR. Bowel reg: ordered  R: Voiding with good uop. Monitor I/O's.  H: VTE ppx: start POD1. F/u post op Hg. I: Afebrile. Monitor CBC. Abx: ancef to OR  E: Glucose wnl. M: PT/OT. WB status: pending ortho. LDA: PIV  Dispo: pending PT      Chief Complaint: \"ok\"    SUBJECTIVE    Ev Pride is better than yesterday. Her pain is currently controlled and she was able to sleep overnight. Cardiology has come to see her and she will be going to the OR this afternoon. OBJECTIVE  VITALS: Temp: Temp: 98.4 °F (36.9 °C)Temp  Av °F (36.7 °C)  Min: 97.5 °F (36.4 °C)  Max: 98.6 °F (37 °C) BP Systolic (49IXN), BKF:123 , Min:96 , OFB:965   Diastolic (07EHU), STW:19, Min:67, Max:141   Pulse Pulse  Av.3  Min: 85  Max: 109 Resp Resp  Av.4  Min: 11  Max: 23 Pulse ox SpO2  Av.3 %  Min: 91 %  Max: 100 %  GENERAL: alert, no distress  NEURO: CN grossly intact, moving ext spont  LUNGS: even and unlabored  HEART: irregular rhythm rate in low 100s  ABDOMEN: soft, non-tender and non-distended  EXTREMITY: b/l lower extr splinted, no strikethrough in dressings. Good cap refill in toes, intact movement and sensation in toes. I/O last 3 completed shifts:   In: 750 [I.V.:750]  Out: -     Drain/tube output:  In: 2365 [I.V.:2365]  Out: 650 [Urine:650]    LAB:  CBC:   Recent Labs     22  1201 22  0556   WBC 12.6* 7.6   HGB 12.2 10.3*   HCT 36.8 32.4*   MCV 94.1 96.1    135*     BMP:   Recent Labs 01/30/22  1201 01/31/22  0556    133*   K 4.5 3.8   CL 98 96*   CO2 28 27   BUN 35* 36*   CREATININE 1.45* 1.70*   GLUCOSE 114* 118*     COAGS:   Recent Labs     01/30/22  1201   APTT 16.9*   INR 1.0       RADIOLOGY:  No new imaging this am      Caro Manning MD  1/31/22, 6:55 AM       Attending Note      I have reviewed the above [facility] note(s) and I either performed the key elements of the medical history and physical exam or was present with the trauma resident when the key elements of the medical history and physical exam were performed. I have discussed the findings, established the care plan and recommendations with the trauma team.  For ortho intervention.     Blanca Manning MD  1/31/2022  3:45 PM Statement Selected

## (undated) DEVICE — SVMMC ORTH SPL DRP PK

## (undated) DEVICE — C-ARM: Brand: UNBRANDED

## (undated) DEVICE — GLOVE ORTHO 8   MSG9480

## (undated) DEVICE — CYSTO/BLADDER IRRIGATION SET, REGULATING CLAMP

## (undated) DEVICE — ZIMMER® STERILE DISPOSABLE TOURNIQUET CUFF WITH PROTECTIVE SLEEVE AND PLC, DUAL PORT, SINGLE BLADDER, 34 IN. (86 CM)

## (undated) DEVICE — GOWN,SIRUS,NONRNF,SETINSLV,2XL,18/CS: Brand: MEDLINE

## (undated) DEVICE — LOCKING SCREW, FULLY THREADED
Type: IMPLANTABLE DEVICE | Site: TIBIA | Status: NON-FUNCTIONAL
Removed: 2022-01-31

## (undated) DEVICE — GLOVE EXAM M L95IN FNGR THK35MIL PALM THK24MIL OFF WHT

## (undated) DEVICE — GUIDE WIRE, BALL-TIPPED, STERILE

## (undated) DEVICE — SUTURE NONABSORBABLE MONOFILAMENT 3-0 PS-1 18 IN BLK ETHILON 1663H

## (undated) DEVICE — BANDAGE COMPR W6INXL15YD WHT BGE POLY COT WV E HK LOOP CLSR

## (undated) DEVICE — BANDAGE COMPR W6INXL12FT SMOOTH FOR LIMB EXSANG ESMARCH

## (undated) DEVICE — NAIL INSERTION SLEEVE, ELASTIC SPI DIAM.8-13: Brand: T2

## (undated) DEVICE — YANKAUER,FLEXIBLE HANDLE,REGLR CAPACITY: Brand: MEDLINE INDUSTRIES, INC.

## (undated) DEVICE — Z DISCONTINUED USE 2272124 DRAPE SURG XL N INVASIVE 2 LAYR DISP

## (undated) DEVICE — GOWN,SIRUS,NONRNF,SETINSLV,XL,20/CS: Brand: MEDLINE

## (undated) DEVICE — GLOVE ORANGE PI 7 1/2   MSG9075

## (undated) DEVICE — PADDING,UNDERCAST,COTTON, 4"X4YD STERILE: Brand: MEDLINE

## (undated) DEVICE — GLOVE ORANGE PI 7   MSG9070

## (undated) DEVICE — BLADE CLIPPER GEN PURP NS

## (undated) DEVICE — SUTURE VCRL SZ 2-0 L18IN ABSRB UD CT-1 L36MM 1/2 CIR J839D

## (undated) DEVICE — SUTURE VCRL SZ 0 L18IN ABSRB UD L36MM CT-1 1/2 CIR J840D

## (undated) DEVICE — SPONGE LAP W18XL18IN WHT COT 4 PLY FLD STRUNG RADPQ DISP ST

## (undated) DEVICE — LOCKING DRILL

## (undated) DEVICE — TOTAL TRAY, 16FR 10ML SIL FOLEY, URN: Brand: MEDLINE

## (undated) DEVICE — DRESSING,GAUZE,XEROFORM,CURAD,5"X9",ST: Brand: CURAD

## (undated) DEVICE — COUNTER NDL 10 COUNT HLD 20 FOAM BLK SGL MAG

## (undated) DEVICE — DRAPE,REIN 53X77,STERILE: Brand: MEDLINE

## (undated) DEVICE — DRESSING,GAUZE,XEROFORM,CURAD,1"X8",ST: Brand: CURAD

## (undated) DEVICE — K-WIRE, STERILE

## (undated) DEVICE — TUBING, SUCTION, 9/32" X 20', STRAIGHT: Brand: MEDLINE INDUSTRIES, INC.

## (undated) DEVICE — GAUZE,SPONGE,FLUFF,6"X6.75",STRL,5/TRAY: Brand: MEDLINE

## (undated) DEVICE — REAMER SHAFT, MOD.TRINKLE: Brand: BIXCUT

## (undated) DEVICE — Z DISCONTINUED BY MEDLINE USE 2711682 TRAY SKIN PREP DRY W/ PREM GLV

## (undated) DEVICE — LOCKING SCREW
Type: IMPLANTABLE DEVICE | Site: TIBIA | Status: NON-FUNCTIONAL
Brand: T2 ALPHA
Removed: 2022-01-31

## (undated) DEVICE — C-ARMOR C-ARM EQUIPMENT COVERS CLEAR STERILE UNIVERSAL FIT 12 PER CASE: Brand: C-ARMOR

## (undated) DEVICE — TOWEL,OR,DSP,ST,NATURAL,DLX,4/PK,20PK/CS: Brand: MEDLINE

## (undated) DEVICE — PADDING CAST W6INXL4YD COT LO LINTING WYTEX

## (undated) DEVICE — PAD,ABDOMINAL,5"X9",ST,LF,25/BX: Brand: MEDLINE INDUSTRIES, INC.

## (undated) DEVICE — GUIDEWIRE ORTHO SMTH TP S2 STER3MM 800MM

## (undated) DEVICE — FREEHAND DRILL

## (undated) DEVICE — TUBING AMB

## (undated) DEVICE — NAIL INSERTION SLEEVE, ELASTIC SPI DIAM.8-11: Brand: T2

## (undated) DEVICE — SUTURE ETHLN SZ 5-0 L18IN NONABSORBABLE BLK L19MM PS-2 3/8 1666G

## (undated) DEVICE — INTENDED FOR TISSUE SEPARATION, AND OTHER PROCEDURES THAT REQUIRE A SHARP SURGICAL BLADE TO PUNCTURE OR CUT.: Brand: BARD-PARKER ® CARBON RIB-BACK BLADES

## (undated) DEVICE — CONTAINER,SPECIMEN,4OZ,OR STRL: Brand: MEDLINE

## (undated) DEVICE — GLOVE ORANGE PI 8   MSG9080

## (undated) DEVICE — SOLUTION SCRB 4OZ 4% CHG H2O AIDED FOR PREOPERATIVE SKIN

## (undated) DEVICE — GOWN,SURGICAL,AURORA,SLEEVE: Brand: MEDLINE

## (undated) DEVICE — FIXATION K-WIRE SPI, WCH COATED
Type: IMPLANTABLE DEVICE | Site: LEG | Status: NON-FUNCTIONAL
Brand: T2
Removed: 2022-01-31

## (undated) DEVICE — DRILL, AO, STERILE

## (undated) DEVICE — GOWN,AURORA,NONREINFORCED,LARGE: Brand: MEDLINE

## (undated) DEVICE — DRAPE,U/ SHT,SPLIT,PLAS,STERIL: Brand: MEDLINE

## (undated) DEVICE — DRAPE,EXTREMITY,BILATERAL,ORTHOMAX: Brand: MEDLINE

## (undated) DEVICE — STOCKINETTE,IMPERVIOUS,12X48,STERILE: Brand: MEDLINE

## (undated) DEVICE — GLOVE ORANGE PI 8 1/2   MSG9085